# Patient Record
Sex: FEMALE | Race: WHITE | NOT HISPANIC OR LATINO | Employment: FULL TIME | URBAN - NONMETROPOLITAN AREA
[De-identification: names, ages, dates, MRNs, and addresses within clinical notes are randomized per-mention and may not be internally consistent; named-entity substitution may affect disease eponyms.]

---

## 2017-01-10 ENCOUNTER — LAB REQUISITION (OUTPATIENT)
Dept: LAB | Facility: HOSPITAL | Age: 47
End: 2017-01-10

## 2017-01-10 DIAGNOSIS — Z00.00 ENCOUNTER FOR GENERAL ADULT MEDICAL EXAMINATION WITHOUT ABNORMAL FINDINGS: ICD-10-CM

## 2017-01-10 PROCEDURE — 87088 URINE BACTERIA CULTURE: CPT | Performed by: INTERNAL MEDICINE

## 2017-01-10 PROCEDURE — 87086 URINE CULTURE/COLONY COUNT: CPT | Performed by: INTERNAL MEDICINE

## 2017-01-10 PROCEDURE — 87186 SC STD MICRODIL/AGAR DIL: CPT | Performed by: INTERNAL MEDICINE

## 2017-01-12 LAB — BACTERIA SPEC AEROBE CULT: ABNORMAL

## 2017-06-14 ENCOUNTER — TELEPHONE (OUTPATIENT)
Dept: GASTROENTEROLOGY | Age: 47
End: 2017-06-14

## 2017-08-15 ENCOUNTER — OFFICE VISIT (OUTPATIENT)
Dept: INTERNAL MEDICINE | Age: 47
End: 2017-08-15
Payer: COMMERCIAL

## 2017-08-15 VITALS
HEIGHT: 68 IN | WEIGHT: 226 LBS | SYSTOLIC BLOOD PRESSURE: 132 MMHG | BODY MASS INDEX: 34.25 KG/M2 | OXYGEN SATURATION: 98 % | TEMPERATURE: 97.4 F | HEART RATE: 86 BPM | DIASTOLIC BLOOD PRESSURE: 70 MMHG

## 2017-08-15 DIAGNOSIS — F41.9 ANXIETY: ICD-10-CM

## 2017-08-15 DIAGNOSIS — R94.5 ABNORMAL LIVER FUNCTION: ICD-10-CM

## 2017-08-15 DIAGNOSIS — M79.673 PAIN OF FOOT, UNSPECIFIED LATERALITY: ICD-10-CM

## 2017-08-15 DIAGNOSIS — E78.1 HYPERTRIGLYCERIDEMIA: ICD-10-CM

## 2017-08-15 DIAGNOSIS — J01.10 ACUTE FRONTAL SINUSITIS, UNSPECIFIED: ICD-10-CM

## 2017-08-15 DIAGNOSIS — S99.829A TOENAIL TORN AWAY: Primary | ICD-10-CM

## 2017-08-15 DIAGNOSIS — J01.00 ACUTE MAXILLARY SINUSITIS, UNSPECIFIED: ICD-10-CM

## 2017-08-15 DIAGNOSIS — S90.122S: ICD-10-CM

## 2017-08-15 DIAGNOSIS — J20.9 ACUTE BRONCHITIS, UNSPECIFIED ORGANISM: ICD-10-CM

## 2017-08-15 PROBLEM — S92.902A FOOT FRACTURE, LEFT: Status: ACTIVE | Noted: 2017-08-15

## 2017-08-15 PROCEDURE — 99213 OFFICE O/P EST LOW 20 MIN: CPT | Performed by: PHYSICIAN ASSISTANT

## 2017-08-15 RX ORDER — FLUTICASONE PROPIONATE 50 MCG
1 SPRAY, SUSPENSION (ML) NASAL DAILY
COMMUNITY
End: 2018-04-11 | Stop reason: ALTCHOICE

## 2017-08-15 RX ORDER — CYCLOBENZAPRINE HCL 10 MG
10 TABLET ORAL 3 TIMES DAILY PRN
COMMUNITY
End: 2018-04-11 | Stop reason: ALTCHOICE

## 2017-08-15 RX ORDER — SUMATRIPTAN 100 MG/1
100 TABLET, FILM COATED ORAL
COMMUNITY
End: 2018-10-16 | Stop reason: CLARIF

## 2017-08-15 RX ORDER — MONTELUKAST SODIUM 10 MG/1
10 TABLET ORAL NIGHTLY
COMMUNITY
End: 2018-10-16 | Stop reason: CLARIF

## 2017-08-15 ASSESSMENT — ENCOUNTER SYMPTOMS
DIARRHEA: 0
ABDOMINAL PAIN: 0
PHOTOPHOBIA: 0
NAUSEA: 0
COLOR CHANGE: 0
COUGH: 0
EYE REDNESS: 0
CHEST TIGHTNESS: 0
RHINORRHEA: 0
CONSTIPATION: 0
SORE THROAT: 0
EYE PAIN: 0
SHORTNESS OF BREATH: 0
WHEEZING: 0
SINUS PRESSURE: 0
VOMITING: 0

## 2017-08-15 ASSESSMENT — PATIENT HEALTH QUESTIONNAIRE - PHQ9
1. LITTLE INTEREST OR PLEASURE IN DOING THINGS: 0
SUM OF ALL RESPONSES TO PHQ QUESTIONS 1-9: 0
2. FEELING DOWN, DEPRESSED OR HOPELESS: 0
SUM OF ALL RESPONSES TO PHQ9 QUESTIONS 1 & 2: 0

## 2017-08-17 ENCOUNTER — TRANSCRIBE ORDERS (OUTPATIENT)
Dept: ADMINISTRATIVE | Facility: HOSPITAL | Age: 47
End: 2017-08-17

## 2017-08-17 DIAGNOSIS — Z12.31 ENCOUNTER FOR SCREENING MAMMOGRAM FOR MALIGNANT NEOPLASM OF BREAST: Primary | ICD-10-CM

## 2017-08-24 ENCOUNTER — APPOINTMENT (OUTPATIENT)
Dept: MAMMOGRAPHY | Facility: HOSPITAL | Age: 47
End: 2017-08-24
Attending: OBSTETRICS & GYNECOLOGY

## 2017-08-31 RX ORDER — BUSPIRONE HYDROCHLORIDE 15 MG/1
TABLET ORAL
Qty: 180 TABLET | Refills: 1 | Status: SHIPPED | OUTPATIENT
Start: 2017-08-31 | End: 2017-12-15

## 2017-09-01 ENCOUNTER — OFFICE VISIT (OUTPATIENT)
Dept: INTERNAL MEDICINE | Age: 47
End: 2017-09-01
Payer: COMMERCIAL

## 2017-09-01 VITALS
SYSTOLIC BLOOD PRESSURE: 124 MMHG | HEART RATE: 81 BPM | WEIGHT: 220 LBS | OXYGEN SATURATION: 98 % | DIASTOLIC BLOOD PRESSURE: 80 MMHG | TEMPERATURE: 97.9 F | BODY MASS INDEX: 33.34 KG/M2 | HEIGHT: 68 IN

## 2017-09-01 DIAGNOSIS — R53.83 FATIGUE, UNSPECIFIED TYPE: Primary | ICD-10-CM

## 2017-09-01 DIAGNOSIS — R42 DIZZINESS: ICD-10-CM

## 2017-09-01 DIAGNOSIS — D50.0 IRON DEFICIENCY ANEMIA DUE TO CHRONIC BLOOD LOSS: ICD-10-CM

## 2017-09-01 DIAGNOSIS — R53.1 WEAKNESS: ICD-10-CM

## 2017-09-01 LAB
ALBUMIN SERPL-MCNC: 3.8 G/DL (ref 3.5–5.2)
ALP BLD-CCNC: 89 U/L (ref 35–104)
ALT SERPL-CCNC: 41 U/L (ref 5–33)
ANION GAP SERPL CALCULATED.3IONS-SCNC: 13 MMOL/L (ref 7–19)
AST SERPL-CCNC: 36 U/L (ref 5–32)
BASOPHILS ABSOLUTE: 0.1 K/UL (ref 0–0.2)
BASOPHILS RELATIVE PERCENT: 0.6 % (ref 0–1)
BILIRUB SERPL-MCNC: <0.2 MG/DL (ref 0.2–1.2)
BUN BLDV-MCNC: 13 MG/DL (ref 6–20)
CALCIUM SERPL-MCNC: 9.2 MG/DL (ref 8.6–10)
CHLORIDE BLD-SCNC: 102 MMOL/L (ref 98–111)
CO2: 26 MMOL/L (ref 22–29)
CREAT SERPL-MCNC: 0.5 MG/DL (ref 0.5–0.9)
EOSINOPHILS ABSOLUTE: 0.2 K/UL (ref 0–0.6)
EOSINOPHILS RELATIVE PERCENT: 1.6 % (ref 0–5)
FERRITIN: 38.2 NG/ML (ref 13–150)
GFR NON-AFRICAN AMERICAN: >60
GLUCOSE BLD-MCNC: 90 MG/DL (ref 74–109)
HCT VFR BLD CALC: 43.2 % (ref 37–47)
HEMOGLOBIN: 13.8 G/DL (ref 12–16)
LYMPHOCYTES ABSOLUTE: 3 K/UL (ref 1.1–4.5)
LYMPHOCYTES RELATIVE PERCENT: 29.1 % (ref 20–40)
MCH RBC QN AUTO: 29.9 PG (ref 27–31)
MCHC RBC AUTO-ENTMCNC: 31.9 G/DL (ref 33–37)
MCV RBC AUTO: 93.7 FL (ref 81–99)
MONOCYTES ABSOLUTE: 1 K/UL (ref 0–0.9)
MONOCYTES RELATIVE PERCENT: 9.3 % (ref 0–10)
NEUTROPHILS ABSOLUTE: 6 K/UL (ref 1.5–7.5)
NEUTROPHILS RELATIVE PERCENT: 59 % (ref 50–65)
PDW BLD-RTO: 13.5 % (ref 11.5–14.5)
PLATELET # BLD: 373 K/UL (ref 130–400)
PMV BLD AUTO: 11.6 FL (ref 9.4–12.3)
POTASSIUM SERPL-SCNC: 4 MMOL/L (ref 3.5–5)
RBC # BLD: 4.61 M/UL (ref 4.2–5.4)
SODIUM BLD-SCNC: 141 MMOL/L (ref 136–145)
TOTAL IRON BINDING CAPACITY: 364 UG/DL (ref 250–400)
TOTAL PROTEIN: 7.3 G/DL (ref 6.6–8.7)
WBC # BLD: 10.2 K/UL (ref 4.8–10.8)

## 2017-09-01 PROCEDURE — 99213 OFFICE O/P EST LOW 20 MIN: CPT | Performed by: PHYSICIAN ASSISTANT

## 2017-09-01 RX ORDER — CIPROFLOXACIN 500 MG/1
500 TABLET, FILM COATED ORAL 2 TIMES DAILY
COMMUNITY
End: 2017-10-06

## 2017-09-01 ASSESSMENT — ENCOUNTER SYMPTOMS
CONSTIPATION: 0
ABDOMINAL PAIN: 0
SINUS PRESSURE: 0
COLOR CHANGE: 0
PHOTOPHOBIA: 0
RHINORRHEA: 0
NAUSEA: 0
SHORTNESS OF BREATH: 0
EYE PAIN: 0
CHEST TIGHTNESS: 0
VOMITING: 0
SORE THROAT: 0
COUGH: 0
DIARRHEA: 0
WHEEZING: 0
EYE REDNESS: 0

## 2017-10-06 ENCOUNTER — OFFICE VISIT (OUTPATIENT)
Dept: URGENT CARE | Age: 47
End: 2017-10-06
Payer: COMMERCIAL

## 2017-10-06 VITALS
SYSTOLIC BLOOD PRESSURE: 135 MMHG | TEMPERATURE: 98 F | BODY MASS INDEX: 33.46 KG/M2 | DIASTOLIC BLOOD PRESSURE: 84 MMHG | HEIGHT: 68 IN | WEIGHT: 220.8 LBS | OXYGEN SATURATION: 96 % | RESPIRATION RATE: 20 BRPM | HEART RATE: 100 BPM

## 2017-10-06 DIAGNOSIS — J01.00 ACUTE MAXILLARY SINUSITIS, RECURRENCE NOT SPECIFIED: Primary | ICD-10-CM

## 2017-10-06 PROCEDURE — 99202 OFFICE O/P NEW SF 15 MIN: CPT | Performed by: NURSE PRACTITIONER

## 2017-10-06 PROCEDURE — 96372 THER/PROPH/DIAG INJ SC/IM: CPT | Performed by: NURSE PRACTITIONER

## 2017-10-06 RX ORDER — AMOXICILLIN AND CLAVULANATE POTASSIUM 875; 125 MG/1; MG/1
1 TABLET, FILM COATED ORAL EVERY 12 HOURS
Qty: 20 TABLET | Refills: 0 | Status: SHIPPED | OUTPATIENT
Start: 2017-10-06 | End: 2017-10-16

## 2017-10-06 RX ORDER — DEXAMETHASONE SODIUM PHOSPHATE 100 MG/10ML
10 INJECTION INTRAMUSCULAR; INTRAVENOUS ONCE
Status: COMPLETED | OUTPATIENT
Start: 2017-10-06 | End: 2017-10-06

## 2017-10-06 RX ORDER — BENZONATATE 100 MG/1
100 CAPSULE ORAL 3 TIMES DAILY PRN
Qty: 30 CAPSULE | Refills: 0 | Status: SHIPPED | OUTPATIENT
Start: 2017-10-06 | End: 2017-10-13

## 2017-10-06 RX ADMIN — DEXAMETHASONE SODIUM PHOSPHATE 10 MG: 100 INJECTION INTRAMUSCULAR; INTRAVENOUS at 09:52

## 2017-10-06 ASSESSMENT — ENCOUNTER SYMPTOMS
SORE THROAT: 1
COUGH: 1
GASTROINTESTINAL NEGATIVE: 1
SINUS PRESSURE: 1

## 2017-10-06 NOTE — PROGRESS NOTES
[address] [address]  Dept: [phone]  Loc: [phone]    Artemio Jacobo is a 50 y.o. female who presents today for her medical conditions/complaints as noted below. Artemio Jacobo is c/o of Cough (started yesterday and can be productive at times); Nasal Congestion; Headache; Chest Congestion; and Pharyngitis        HPI:     HPI     Patient presents to office complaining of sinus pain, congestion, headache, nasal congestion and headache that started about a week ago. She denies any fever. She reports some chills Wednesday afternoon. She reports that her cough is productive at times. She reports a sore throat with postnasal drainage. She denies any abd pain, vomiting or diarrhea. Past Medical History:   Diagnosis Date    Acquired deviated nasal septum     Allergic rhinitis     Ankle injury     Asthma     Benign paroxysmal positional vertigo     Depression     Derangement of medial meniscus     L knee mild, clinical diagnosis 9/19/12 after turning foot and popping sound 9/16/12 while walking and turn.     Eustachian tube dysfunction     Excessive sleepiness     During the day    H/O varicose veins     History of acute bronchitis     History of acute otitis media     History of bone density study     History of sprain of foot     Osteoarthritis     Pain in joint of left knee     Palpitations     Psoriasis     Type B influenza       Past Surgical History:   Procedure Laterality Date    APPENDECTOMY      BREAST SURGERY      lift and a left implant   [place] [facility]    Dr. Lani Hansen    ENDOSCOPY, COLON, DIAGNOSTIC      KNEE ARTHROSCOPY Bilateral     OH COLONOSCOPY FLX DX W/COLLJ SPEC WHEN PFRMD N/A 12/19/2016    Dr Dee Hansen       Family History   Problem Relation Age of screen  06/12/1983    DTaP/Tdap/Td vaccine (1 - Tdap) 06/12/1987    Cervical cancer screen  06/12/1989    Lipid screen  06/12/2008    Flu vaccine (1) 09/01/2017    Colon cancer screen colonoscopy  12/19/2021       Subjective:      Review of Systems   Constitutional: Negative for fever. HENT: Positive for congestion, postnasal drip, sinus pressure and sore throat. Negative for ear pain. Respiratory: Positive for cough. Cardiovascular: Negative. Gastrointestinal: Negative. Neurological: Positive for headaches. Objective:     Physical Exam   Constitutional: She is oriented to person, place, and time. She appears well-developed and well-nourished. No distress. HENT:   Head: Normocephalic and atraumatic. Right Ear: Hearing, external ear and ear canal normal. A middle ear effusion is present. Left Ear: Hearing, external ear and ear canal normal. A middle ear effusion is present. Nose: Right sinus exhibits maxillary sinus tenderness. Right sinus exhibits no frontal sinus tenderness. Left sinus exhibits maxillary sinus tenderness. Left sinus exhibits no frontal sinus tenderness. Mouth/Throat: Uvula is midline and mucous membranes are normal. Posterior oropharyngeal erythema present. No oropharyngeal exudate. Eyes: Pupils are equal, round, and reactive to light. Neck: Normal range of motion. Cardiovascular: Normal rate, regular rhythm and normal heart sounds. No murmur heard. Pulmonary/Chest: Effort normal and breath sounds normal. No respiratory distress. She has no wheezes. She has no rales. Abdominal: Soft. Bowel sounds are normal.   Musculoskeletal: Normal range of motion. Lymphadenopathy:     She has no cervical adenopathy. Neurological: She is alert and oriented to person, place, and time. Skin: Skin is warm and dry. No rash noted. No erythema. Psychiatric: She has a normal mood and affect.  Her speech is normal and behavior is normal. Judgment and thought content normal.   Nursing note and vitals reviewed. /84  Pulse 100  Temp 98 °F (36.7 °C) (Oral)   Resp 20  Ht 5' 8\" (1.727 m)  Wt 220 lb 12.8 oz (100.2 kg)  SpO2 96%  BMI 33.57 kg/m2    Assessment:      1. Acute maxillary sinusitis, recurrence not specified         Plan:     Discussed diagnosis and treatment with patient. Take full course of antibiotics. Steroid injection in office. Use tessalon as needed for coughing. Advised symptomatic treatment. If patient is not improving or developing any new/worsening symptoms then RTC, prn or go to ER. Patient is to follow up with PCP as needed. Patient verbalizes understanding. No orders of the defined types were placed in this encounter. Return if symptoms worsen or fail to improve. No orders of the defined types were placed in this encounter. Orders Placed This Encounter   Medications    amoxicillin-clavulanate (AUGMENTIN) 875-125 MG per tablet     Sig: Take 1 tablet by mouth every 12 hours for 10 days     Dispense:  20 tablet     Refill:  0    dexamethasone (DECADRON) injection 10 mg    benzonatate (TESSALON PERLES) 100 MG capsule     Sig: Take 1 capsule by mouth 3 times daily as needed for Cough     Dispense:  30 capsule     Refill:  0       Patient given educational materials - see patient instructions. Discussed use, benefit, and side effects of prescribed medications. All patient questions answered. Pt voiced understanding. Reviewed health maintenance. Instructed to continue current medications, diet and exercise. Patient agreed with treatment plan. Follow up as directed. Patient Instructions        Sinusitis: Care Instructions  Your Care Instructions    Sinusitis is an infection of the lining of the sinus cavities in your head. Sinusitis often follows a cold. It causes pain and pressure in your head and face. In most cases, sinusitis gets better on its own in 1 to 2 weeks.  But some mild symptoms may last for several

## 2017-10-06 NOTE — MR AVS SNAPSHOT
After Visit Summary             Rasheed Fletcher   10/6/2017 9:25 AM   Office Visit    Description:  Female : 1970   Provider:  MICHAEL Garber   Department:  [facility]              Your Follow-Up and Future Appointments         Below is a list of your follow-up and future appointments. This may not be a complete list as you may have made appointments directly with providers that we are not aware of or your providers may have made some for you. Please call your providers to confirm appointments. It is important to keep your appointments. Please bring your current insurance card, photo ID, co-pay, and all medication bottles to your appointment. If self-pay, payment is expected at the time of service. Your To-Do List     Future Appointments Provider Department Dept Phone    2017 2:00 PM Zack Montenegro MD [facility] [phone]    Please arrive 15 minutes prior to appointment time, bring insurance card and photo ID. Follow-Up    Return if symptoms worsen or fail to improve. Information from Your Visit        Department     Name Address  [facility] [address] [place] [address]       You Were Seen for:         Comments    Acute maxillary sinusitis, recurrence not specified   [[id number]]         Vital Signs     Blood Pressure Pulse Temperature Respirations Height Weight    135/84 100 98 °F (36.7 °C) (Oral) 20 5' 8\" (1.727 m) 220 lb 12.8 oz (100.2 kg)    Oxygen Saturation Body Mass Index Smoking Status             96% 33.57 kg/m2 Former Smoker         Additional Information about your Body Mass Index (BMI)           Your BMI as listed above is considered obese (30 or more). BMI is an estimate of body fat, calculated from your height and weight.   The higher your BMI, the greater your risk of heart disease, high blood pressure, type 2 diabetes, stroke, gallstones, arthritis, sleep apnea, and certain cancers. BMI is not perfect. It may overestimate body fat in athletes and people who are more muscular. Even a small weight loss (between 5 and 10 percent of your current weight) by decreasing your calorie intake and becoming more physically active will help lower your risk of developing or worsening diseases associated with obesity. Learn more at: [employer]          Instructions         Sinusitis: Care Instructions  Your Care Instructions    Sinusitis is an infection of the lining of the sinus cavities in your head. Sinusitis often follows a cold. It causes pain and pressure in your head and face. In most cases, sinusitis gets better on its own in 1 to 2 weeks. But some mild symptoms may last for several weeks. Sometimes antibiotics are needed. Follow-up care is a key part of your treatment and safety. Be sure to make and go to all appointments, and call your doctor if you are having problems. It's also a good idea to know your test results and keep a list of the medicines you take. How can you care for yourself at home? · Take an over-the-counter pain medicine, such as acetaminophen (Tylenol), ibuprofen (Advil, Motrin), or naproxen (Aleve). Read and follow all instructions on the label. · If the doctor prescribed antibiotics, take them as directed. Do not stop taking them just because you feel better. You need to take the full course of antibiotics. · Be careful when taking over-the-counter cold or flu medicines and Tylenol at the same time. Many of these medicines have acetaminophen, which is Tylenol. Read the labels to make sure that you are not taking more than the recommended dose. Too much acetaminophen (Tylenol) can be harmful. · Breathe warm, moist air from a steamy shower, a hot bath, or a sink filled with hot water. Avoid cold, dry air. Using a humidifier in your home may help. Follow the directions for cleaning the machine. · Use saline (saltwater) nasal washes to help keep your nasal passages open and wash out mucus and bacteria. You can buy saline nose drops at a grocery store or drugstore. Or you can make your own at home by adding 1 teaspoon of salt and 1 teaspoon of baking soda to 2 cups of distilled water. If you make your own, fill a bulb syringe with the solution, insert the tip into your nostril, and squeeze gently. Yasmin Fogo your nose. · Put a hot, wet towel or a warm gel pack on your face 3 or 4 times a day for 5 to 10 minutes each time. · Try a decongestant nasal spray like oxymetazoline (Afrin). Do not use it for more than 3 days in a row. Using it for more than 3 days can make your congestion worse. When should you call for help? Call your doctor now or seek immediate medical care if:  · You have new or worse swelling or redness in your face or around your eyes. · You have a new or higher fever. Watch closely for changes in your health, and be sure to contact your doctor if:  · You have new or worse facial pain. · The mucus from your nose becomes thicker (like pus) or has new blood in it. · You are not getting better as expected. Where can you learn more? Go to [email or web address] and sign in to your Tunespeak account. Enter G794 in the [facility] box to learn more about \"Sinusitis: Care Instructions. \"     If you do not have an account, please click on the \"Sign Up Now\" link. Current as of: July 29, 2016  Content Version: 11.3  © 2826-2181 [employer]. Care instructions adapted under license by [facility] ([facility]). If you have questions about a medical condition or this instruction, always ask your healthcare professional. [address]  any warranty or liability for your use of this information. 1. Take full course of antibiotics  2. Steroid injection in office  3. Take tessalon as needed for coughing  4.  Monitor fever and treat as needed 5. May use OTC claritin/zyrtec and nasal spray such as flonase to help symptoms  6. If patient is not improving or developing any new/worsening symptoms then RTC prn              Today's Medication Changes          These changes are accurate as of: 10/6/17  9:49 AM.  If you have any questions, ask your nurse or doctor. START taking these medications           amoxicillin-clavulanate 875-125 MG per tablet   Commonly known as:  AUGMENTIN   Instructions: Take 1 tablet by mouth every 12 hours for 10 days   Quantity:  20 tablet   Refills:  0       benzonatate 100 MG capsule   Commonly known as:  TESSALON PERLES   Instructions: Take 1 capsule by mouth 3 times daily as needed for Cough   Quantity:  30 capsule   Refills:  0         STOP taking these medications           CIPRO 500 MG tablet   Generic drug:  ciprofloxacin            Where to Get Your Medications      These medications were sent to [address] [place] [address] 7  [address] [address], [address] [address]     Phone:  [phone]     amoxicillin-clavulanate 875-125 MG per tablet    benzonatate 100 MG capsule               Your Current Medications Are              amoxicillin-clavulanate (AUGMENTIN) 875-125 MG per tablet Take 1 tablet by mouth every 12 hours for 10 days    benzonatate (TESSALON PERLES) 100 MG capsule Take 1 capsule by mouth 3 times daily as needed for Cough    Apremilast (OTEZLA) 30 MG TABS Take 30 mg by mouth 2 times daily    busPIRone (BUSPAR) 15 MG tablet TAKE 1 TABLET EVERY 12 HOURS AS NEEDED.     cyclobenzaprine (FLEXERIL) 10 MG tablet Take 10 mg by mouth 3 times daily as needed for Muscle spasms    meloxicam (MOBIC) 15 MG tablet Take 15 mg by mouth    venlafaxine (EFFEXOR) 75 MG tablet Take 75 mg by mouth    calcium carbonate (OSCAL) 500 MG TABS tablet Take 500 mg by mouth daily    hyoscyamine (LEVBID) 0.375 MG extended release tablet Take 1 tablet by mouth every 12 hours as needed for Cramping or Diarrhea    Ferrous Fumarate 325 (106 Fe) MG TABS Take 1 tablet by mouth daily    fluticasone (FLONASE) 50 MCG/ACT nasal spray 1 spray by Nasal route daily    SUMAtriptan (IMITREX) 100 MG tablet Take 100 mg by mouth once as needed for Migraine    montelukast (SINGULAIR) 10 MG tablet Take 10 mg by mouth nightly    Vitamin D, Cholecalciferol, 1000 UNITS CAPS Take by mouth      Allergies           No Known Allergies         Additional Information        Basic Information     Date Of Birth Sex Race Ethnicity Preferred Language    1970 Female White Non-/Non  English      Problem List as of 10/6/2017  Date Reviewed: 9/1/2017                Abnormal liver function    Acute bronchitis    Acute frontal sinusitis, unspecified    Acute maxillary sinusitis, unspecified    Foot fracture, left    Foot pain    Anxiety    Hypertriglyceridemia    Diarrhea    Change in bowel habit    Anal bleeding    Cystitis    Elevated blood pressure    FOM (frequency of micturition)    Essential (primary) hypertension      Immunizations as of 10/6/2017     Name Date    Influenza Vaccine, unspecified formulation 10/19/2016      Preventive Care        Date Due    HIV screening is recommended for all people regardless of risk factors  aged 15-65 years at least once (lifetime) who have never been HIV tested. 6/12/1983    Tetanus Combination Vaccine (1 - Tdap) 6/12/1987    Pap Smear 6/12/1989    Cholesterol Screening 6/12/2008    Yearly Flu Vaccine (1) 9/1/2017    Colonoscopy 12/19/2021            MyChart Signup           MyChart allows you to send messages to your doctor, view your test results, renew your prescriptions, schedule appointments, view visit notes, and more. How Do I Sign Up? 1. In your Internet browser, go to [email or web address]  2. Click on the Sign Up Now link in the Sign In box. You will see the New Member Sign Up page.

## 2017-10-06 NOTE — PATIENT INSTRUCTIONS
Sinusitis: Care Instructions  Your Care Instructions    Sinusitis is an infection of the lining of the sinus cavities in your head. Sinusitis often follows a cold. It causes pain and pressure in your head and face. In most cases, sinusitis gets better on its own in 1 to 2 weeks. But some mild symptoms may last for several weeks. Sometimes antibiotics are needed. Follow-up care is a key part of your treatment and safety. Be sure to make and go to all appointments, and call your doctor if you are having problems. It's also a good idea to know your test results and keep a list of the medicines you take. How can you care for yourself at home? · Take an over-the-counter pain medicine, such as acetaminophen (Tylenol), ibuprofen (Advil, Motrin), or naproxen (Aleve). Read and follow all instructions on the label. · If the doctor prescribed antibiotics, take them as directed. Do not stop taking them just because you feel better. You need to take the full course of antibiotics. · Be careful when taking over-the-counter cold or flu medicines and Tylenol at the same time. Many of these medicines have acetaminophen, which is Tylenol. Read the labels to make sure that you are not taking more than the recommended dose. Too much acetaminophen (Tylenol) can be harmful. · Breathe warm, moist air from a steamy shower, a hot bath, or a sink filled with hot water. Avoid cold, dry air. Using a humidifier in your home may help. Follow the directions for cleaning the machine. · Use saline (saltwater) nasal washes to help keep your nasal passages open and wash out mucus and bacteria. You can buy saline nose drops at a grocery store or drugstore. Or you can make your own at home by adding 1 teaspoon of salt and 1 teaspoon of baking soda to 2 cups of distilled water. If you make your own, fill a bulb syringe with the solution, insert the tip into your nostril, and squeeze gently. Yasmin Fogo your nose.   · Put a hot, wet towel or a warm

## 2017-10-22 PROBLEM — F41.1 GENERALIZED ANXIETY DISORDER: Status: ACTIVE | Noted: 2017-10-22

## 2017-10-22 PROBLEM — R73.01 IFG (IMPAIRED FASTING GLUCOSE): Status: ACTIVE | Noted: 2017-10-22

## 2017-10-22 PROBLEM — K58.9 IBS (IRRITABLE BOWEL SYNDROME): Status: ACTIVE | Noted: 2017-10-22

## 2017-10-22 PROBLEM — Z12.11 SCREENING FOR COLORECTAL CANCER: Status: ACTIVE | Noted: 2017-10-22

## 2017-10-22 PROBLEM — F33.2 ENDOGENOUS DEPRESSION (HCC): Status: ACTIVE | Noted: 2017-10-22

## 2017-10-22 PROBLEM — L40.9 PSORIASIS: Status: ACTIVE | Noted: 2017-10-22

## 2017-10-22 PROBLEM — E66.09 EXOGENOUS OBESITY: Status: ACTIVE | Noted: 2017-10-22

## 2017-10-22 PROBLEM — J01.00 ACUTE MAXILLARY SINUSITIS, UNSPECIFIED: Status: RESOLVED | Noted: 2017-08-15 | Resolved: 2017-10-22

## 2017-10-22 PROBLEM — J01.10 ACUTE FRONTAL SINUSITIS, UNSPECIFIED: Status: RESOLVED | Noted: 2017-08-15 | Resolved: 2017-10-22

## 2017-10-22 PROBLEM — Z12.12 SCREENING FOR COLORECTAL CANCER: Status: ACTIVE | Noted: 2017-10-22

## 2017-10-24 ENCOUNTER — HOSPITAL ENCOUNTER (OUTPATIENT)
Dept: MAMMOGRAPHY | Facility: HOSPITAL | Age: 47
Discharge: HOME | End: 2017-10-24
Attending: OBSTETRICS & GYNECOLOGY | Admitting: OBSTETRICS & GYNECOLOGY

## 2017-10-24 DIAGNOSIS — Z12.31 ENCOUNTER FOR SCREENING MAMMOGRAM FOR MALIGNANT NEOPLASM OF BREAST: ICD-10-CM

## 2017-10-24 PROCEDURE — G0202 SCR MAMMO BI INCL CAD: HCPCS

## 2017-10-24 PROCEDURE — 77063 BREAST TOMOSYNTHESIS BI: CPT

## 2017-10-30 DIAGNOSIS — E78.1 HYPERTRIGLYCERIDEMIA: ICD-10-CM

## 2017-10-30 DIAGNOSIS — Z00.00 ANNUAL PHYSICAL EXAM: ICD-10-CM

## 2017-10-30 DIAGNOSIS — R73.01 IFG (IMPAIRED FASTING GLUCOSE): Primary | ICD-10-CM

## 2017-12-15 ENCOUNTER — OFFICE VISIT (OUTPATIENT)
Dept: INTERNAL MEDICINE | Age: 47
End: 2017-12-15
Payer: COMMERCIAL

## 2017-12-15 VITALS
SYSTOLIC BLOOD PRESSURE: 103 MMHG | BODY MASS INDEX: 33.49 KG/M2 | HEART RATE: 106 BPM | HEIGHT: 68 IN | DIASTOLIC BLOOD PRESSURE: 70 MMHG | RESPIRATION RATE: 18 BRPM | WEIGHT: 221 LBS | OXYGEN SATURATION: 98 %

## 2017-12-15 DIAGNOSIS — E66.09 EXOGENOUS OBESITY: ICD-10-CM

## 2017-12-15 DIAGNOSIS — F33.2 ENDOGENOUS DEPRESSION (HCC): ICD-10-CM

## 2017-12-15 DIAGNOSIS — F41.1 GENERALIZED ANXIETY DISORDER: Primary | ICD-10-CM

## 2017-12-15 DIAGNOSIS — Z23 IMMUNIZATION DUE: ICD-10-CM

## 2017-12-15 DIAGNOSIS — I10 ESSENTIAL (PRIMARY) HYPERTENSION: ICD-10-CM

## 2017-12-15 PROCEDURE — 90471 IMMUNIZATION ADMIN: CPT | Performed by: INTERNAL MEDICINE

## 2017-12-15 PROCEDURE — 90686 IIV4 VACC NO PRSV 0.5 ML IM: CPT | Performed by: INTERNAL MEDICINE

## 2017-12-15 PROCEDURE — 99214 OFFICE O/P EST MOD 30 MIN: CPT | Performed by: INTERNAL MEDICINE

## 2017-12-15 RX ORDER — DESVENLAFAXINE 100 MG/1
100 TABLET, EXTENDED RELEASE ORAL DAILY
Qty: 30 TABLET | Refills: 3 | Status: SHIPPED | OUTPATIENT
Start: 2017-12-15 | End: 2018-03-19 | Stop reason: SDUPTHER

## 2017-12-15 RX ORDER — BUSPIRONE HYDROCHLORIDE 30 MG/1
TABLET ORAL
Qty: 60 TABLET | Refills: 5 | Status: SHIPPED | OUTPATIENT
Start: 2017-12-15 | End: 2018-04-23 | Stop reason: SDUPTHER

## 2017-12-15 ASSESSMENT — ENCOUNTER SYMPTOMS
ABDOMINAL PAIN: 0
CHEST TIGHTNESS: 0
WHEEZING: 0
COUGH: 0
SORE THROAT: 0
CONSTIPATION: 0

## 2017-12-15 NOTE — PATIENT INSTRUCTIONS
losing weight if you keep track of what you eat and what you do. Follow-up care is a key part of your treatment and safety. Be sure to make and go to all appointments, and call your doctor if you are having problems. It's also a good idea to know your test results and keep a list of the medicines you take. Where can you learn more? Go to https://[email or web address].[email or web address] org and sign in to your pMediaNetwork account. Enter A121 in the [employer] box to learn more about \"Learning About Low-Carbohydrate Diets for Weight Loss. \"     If you do not have an account, please click on the \"Sign Up Now\" link. Current as of: May 12, 2017  Content Version: 11.4  © 8121-5286 [employer]. Care instructions adapted under license by [facility] ([facility]). If you have questions about a medical condition or this instruction, always ask your healthcare professional. [address] any warranty or liability for your use of this information.

## 2017-12-15 NOTE — PROGRESS NOTES
Chief Complaint   Patient presents with    Follow-up     medication review     History of presenting illness:  Jovanni Hughes is a 50 y.o. female who presents today for follow up on her chronic medical conditions as noted below. 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    Psoriasis 10/22/2017    IBS (irritable bowel syndrome) 10/22/2017    Screening for colorectal cancer 10/22/2017     Overview Note:     12/16 repeat 5 yrs      History of bone density study     Abnormal liver function 08/15/2017    Foot fracture, left 08/15/2017    Foot pain 08/15/2017    Hypertriglyceridemia 08/15/2017    Altered bowel function 10/31/2016     Overview Note:     Updating Deprecated Diagnoses      Elevated blood pressure 10/15/2016    Essential (primary) hypertension 10/15/2016     Past Medical History:   Diagnosis Date    Acquired deviated nasal septum     Anal bleeding 10/31/2016    Ankle injury     Asthma     Benign paroxysmal positional vertigo     Derangement of medial meniscus     L knee mild, clinical diagnosis 9/19/12 after turning foot and popping sound 9/16/12 while walking and turn.     Eustachian tube dysfunction     Excessive sleepiness     During the day    H/O varicose veins     History of sprain of foot     Osteoarthritis     Pain in joint of left knee     Palpitations     Psoriasis       Past Surgical History:   Procedure Laterality Date    APPENDECTOMY      BREAST SURGERY      lift and a left implant   [address] [place], [place]    ENDOSCOPY, COLON, DIAGNOSTIC      KNEE ARTHROSCOPY Bilateral     AR COLONOSCOPY FLX DX W/COLLJ SPEC WHEN PFRMD N/A 12/19/2016    Dr Vinod Blevins    SINUS SURGERY      nasal septoplasty/ maxillary antrostomies    TONSILLECTOMY      [facility] Gre[facility], [facility]     Current Outpatient Prescriptions   Medication Sig Dispense Refill    desvenlafaxine succinate (PRISTIQ) 100 MG TB24 extended release tablet Take 1 tablet by mouth daily 30 tablet 3    busPIRone (BUSPAR) 30 MG tablet Take 1/2 to  One tablet bid 60 tablet 5    Apremilast (OTEZLA) 30 MG TABS Take 30 mg by mouth 2 times daily      fluticasone (FLONASE) 50 MCG/ACT nasal spray 1 spray by Nasal route daily      SUMAtriptan (IMITREX) 100 MG tablet Take 100 mg by mouth once as needed for Migraine      montelukast (SINGULAIR) 10 MG tablet Take 10 mg by mouth nightly      cyclobenzaprine (FLEXERIL) 10 MG tablet Take 10 mg by mouth 3 times daily as needed for Muscle spasms      meloxicam (MOBIC) 15 MG tablet Take 15 mg by mouth      Vitamin D, Cholecalciferol, 1000 UNITS CAPS Take by mouth      calcium carbonate (OSCAL) 500 MG TABS tablet Take 500 mg by mouth daily      hyoscyamine (LEVBID) 0.375 MG extended release tablet Take 1 tablet by mouth every 12 hours as needed for Cramping or Diarrhea 60 tablet 5     No current facility-administered medications for this visit. No Known Allergies  Social History   Substance Use Topics    Smoking status: Former Smoker     Quit date: 1996    Smokeless tobacco: Never Used    Alcohol use Yes      Comment: occ      Family History   Problem Relation Age of Onset    Colon Cancer Neg Hx     Colon Polyps Neg Hx     Esophageal Cancer Neg Hx     Liver Cancer Neg Hx     Liver Disease Neg Hx     Stomach Cancer Neg Hx     Rectal Cancer Neg Hx        Review of Systems   Constitutional: Positive for fatigue. Negative for chills and fever.    HENT: Negative for congestion, ear pain, nosebleeds, postnasal drip and sore throat. Respiratory: Negative for cough, chest tightness and wheezing. Cardiovascular: Negative for chest pain, palpitations and leg swelling. Gastrointestinal: Negative for abdominal pain and constipation. Genitourinary: Negative for dysuria and urgency. Musculoskeletal: Positive for arthralgias. Skin: Negative for rash. Neurological: Negative for dizziness and headaches. Psychiatric/Behavioral: The patient is nervous/anxious. Vitals:    12/15/17 0828   BP: 103/70   Site: Left Arm   Position: Sitting   Cuff Size: Large Adult   Pulse: 106   Resp: 18   SpO2: 98%   Weight: 221 lb (100.2 kg)   Height: 5' 8\" (1.727 m)     Body mass index is 33.6 kg/m². Physical Exam   Constitutional: She is oriented to person, place, and time. She appears well-developed. No distress. HENT:   Head: Normocephalic and atraumatic. Nose: Nose normal.   Mouth/Throat: Oropharynx is clear and moist.   Eyes: Conjunctivae are normal. Pupils are equal, round, and reactive to light. Right eye exhibits no discharge. Left eye exhibits no discharge. No scleral icterus. Neck: Normal range of motion. No JVD present. No thyromegaly present. Cardiovascular: Normal rate and regular rhythm. No murmur heard. Pulmonary/Chest: She has no wheezes. She has rales. She exhibits no tenderness. Abdominal: Bowel sounds are normal. She exhibits no distension. There is no guarding. Musculoskeletal: She exhibits no edema. Lymphadenopathy:     She has no cervical adenopathy. Neurological: She is oriented to person, place, and time. She has normal reflexes. No cranial nerve deficit. Coordination normal.   Skin: Skin is dry. No rash noted. She is not diaphoretic. No erythema. Psychiatric: Her behavior is normal. Judgment and thought content normal.       Lab Review   No visits with results within 2 Month(s) from this visit.    Latest known visit with results is: control,following lower carbohydrate dietary regimen and healthy snacks along side an active lifestyle with supplementary exercise of approx 30 minutes a week, 5 days a week of exercise for weight loss. The patient voiced increased understanding of the topics discussed.    '          Orders Placed This Encounter   Procedures    INFLUENZA, QUADV, 3 YRS AND OLDER, IM, PF, PREFILL SYR OR SDV, 0.5ML (FLUZONE QUADV, PF)     New Prescriptions    BUSPIRONE (BUSPAR) 30 MG TABLET    Take 1/2 to  One tablet bid    DESVENLAFAXINE SUCCINATE (PRISTIQ) 100 MG TB24 EXTENDED RELEASE TABLET    Take 1 tablet by mouth daily        Return in about 3 months (around 3/15/2018) for Annual Physical.   Patient Instructions     Patient Education        Learning About Low-Carbohydrate Diets for Weight Loss  What is a low-carbohydrate diet? Low-carb diets avoid foods that are high in carbohydrate. These high-carb foods include pasta, bread, rice, cereal, fruits, and starchy vegetables. Instead, these diets usually have you eat foods that are high in fat and protein. Many people lose weight quickly on a low-carb diet. But the early weight loss is water. People on this diet often gain the weight back after they start eating carbs again. Not all diet plans are safe or work well. A lot of the evidence shows that low-carb diets aren't healthy. That's because these diets often don't include healthy foods like fruits and vegetables. Losing weight safely means balancing protein, fat, and carbs with every meal and snack. And low-carb diets don't always provide the vitamins, minerals, and fiber you need. If you have a serious medical condition, talk to your doctor before you try any diet. These conditions include kidney disease, heart disease, type 2 diabetes, high cholesterol, and high blood pressure. If you are pregnant, it may not be safe for your baby if you are on a low-carb diet. How can you lose weight safely?   You might have heard that a diet plan helped another person lose weight. But that doesn't mean that it will work for you. It is very hard to stay on a diet that includes lots of big changes in your eating habits. If you want to get to a healthy weight and stay there, making healthy lifestyle changes will often work better than dieting. These steps can help. · Make a plan for change. Work with your doctor to create a plan that is right for you. · See a dietitian. He or she can show you how to make healthy changes in your eating habits. · Manage stress. If you have a lot of stress in your life, it can be hard to focus on making healthy changes to your daily habits. · Track your food and activity. You are likely to do better at losing weight if you keep track of what you eat and what you do. Follow-up care is a key part of your treatment and safety. Be sure to make and go to all appointments, and call your doctor if you are having problems. It's also a good idea to know your test results and keep a list of the medicines you take. Where can you learn more? Go to https://[employer]. org and sign in to your [facility] account. Enter A121 in the [employer] box to learn more about \"Learning About Low-Carbohydrate Diets for Weight Loss. \"     If you do not have an account, please click on the \"Sign Up Now\" link. Current as of: May 12, 2017  Content Version: 11.4  © 9851-5901 Healthwise, [employer]. Care instructions adapted under license by [facility] ([facility]). If you have questions about a medical condition or this instruction, always ask your healthcare professional. [address] any warranty or liability for your use of this information. EMR Dragon/transcription disclaimer:Significant part of this  encounter note is electronic transcription/translation of spoken language to printed text.  The electronic translation of spoken language may be erroneous, or at times, nonsensical words or

## 2018-04-11 ENCOUNTER — OFFICE VISIT (OUTPATIENT)
Dept: URGENT CARE | Age: 48
End: 2018-04-11
Payer: COMMERCIAL

## 2018-04-11 VITALS
RESPIRATION RATE: 20 BRPM | TEMPERATURE: 97.5 F | HEIGHT: 68 IN | SYSTOLIC BLOOD PRESSURE: 122 MMHG | OXYGEN SATURATION: 98 % | WEIGHT: 220 LBS | HEART RATE: 88 BPM | BODY MASS INDEX: 33.34 KG/M2 | DIASTOLIC BLOOD PRESSURE: 88 MMHG

## 2018-04-11 DIAGNOSIS — M54.50 LOW BACK PAIN WITHOUT SCIATICA, UNSPECIFIED BACK PAIN LATERALITY, UNSPECIFIED CHRONICITY: Primary | ICD-10-CM

## 2018-04-11 LAB
APPEARANCE FLUID: NORMAL
BILIRUBIN, POC: NORMAL
BLOOD URINE, POC: NORMAL
CLARITY, POC: NORMAL
COLOR, POC: NORMAL
GLUCOSE URINE, POC: NORMAL
KETONES, POC: NORMAL
LEUKOCYTE EST, POC: NORMAL
NITRITE, POC: NORMAL
PH, POC: 6.5
PROTEIN, POC: NORMAL
SPECIFIC GRAVITY, POC: 1.02
UROBILINOGEN, POC: 0.2

## 2018-04-11 PROCEDURE — 99214 OFFICE O/P EST MOD 30 MIN: CPT | Performed by: PHYSICIAN ASSISTANT

## 2018-04-11 PROCEDURE — 81002 URINALYSIS NONAUTO W/O SCOPE: CPT | Performed by: PHYSICIAN ASSISTANT

## 2018-04-11 RX ORDER — METAXALONE 800 MG/1
800 TABLET ORAL 2 TIMES DAILY
Qty: 20 TABLET | Refills: 0 | Status: SHIPPED | OUTPATIENT
Start: 2018-04-11 | End: 2018-04-21

## 2018-04-11 ASSESSMENT — ENCOUNTER SYMPTOMS
NAUSEA: 0
ABDOMINAL PAIN: 0
VOMITING: 0
DIARRHEA: 0

## 2018-04-12 PROBLEM — Z12.12 SCREENING FOR COLORECTAL CANCER: Status: RESOLVED | Noted: 2017-10-22 | Resolved: 2018-04-12

## 2018-04-12 PROBLEM — Z12.11 SCREENING FOR COLORECTAL CANCER: Status: RESOLVED | Noted: 2017-10-22 | Resolved: 2018-04-12

## 2018-04-13 LAB — URINE CULTURE, ROUTINE: NORMAL

## 2018-04-16 DIAGNOSIS — E78.1 HYPERTRIGLYCERIDEMIA: ICD-10-CM

## 2018-04-16 DIAGNOSIS — Z00.00 ANNUAL PHYSICAL EXAM: ICD-10-CM

## 2018-04-16 DIAGNOSIS — R73.01 IFG (IMPAIRED FASTING GLUCOSE): ICD-10-CM

## 2018-04-16 LAB
ALBUMIN SERPL-MCNC: 4 G/DL (ref 3.5–5.2)
ALP BLD-CCNC: 144 U/L (ref 35–104)
ALT SERPL-CCNC: 104 U/L (ref 5–33)
ANION GAP SERPL CALCULATED.3IONS-SCNC: 12 MMOL/L (ref 7–19)
AST SERPL-CCNC: 56 U/L (ref 5–32)
BACTERIA: ABNORMAL /HPF
BILIRUB SERPL-MCNC: 0.3 MG/DL (ref 0.2–1.2)
BILIRUBIN URINE: NEGATIVE
BLOOD, URINE: NEGATIVE
BUN BLDV-MCNC: 11 MG/DL (ref 6–20)
CALCIUM SERPL-MCNC: 10.1 MG/DL (ref 8.6–10)
CHLORIDE BLD-SCNC: 103 MMOL/L (ref 98–111)
CHOLESTEROL, TOTAL: 206 MG/DL (ref 160–199)
CLARITY: ABNORMAL
CO2: 28 MMOL/L (ref 22–29)
COLOR: YELLOW
CREAT SERPL-MCNC: 0.5 MG/DL (ref 0.5–0.9)
EPITHELIAL CELLS, UA: 6 /HPF (ref 0–5)
GFR NON-AFRICAN AMERICAN: >60
GLUCOSE BLD-MCNC: 99 MG/DL (ref 74–109)
GLUCOSE URINE: NEGATIVE MG/DL
HBA1C MFR BLD: 5.9 %
HCT VFR BLD CALC: 45.3 % (ref 37–47)
HDLC SERPL-MCNC: 50 MG/DL (ref 65–121)
HEMOGLOBIN: 14.1 G/DL (ref 12–16)
HYALINE CASTS: 4 /HPF (ref 0–8)
KETONES, URINE: NEGATIVE MG/DL
LDL CHOLESTEROL CALCULATED: 121 MG/DL
LEUKOCYTE ESTERASE, URINE: ABNORMAL
MCH RBC QN AUTO: 28.4 PG (ref 27–31)
MCHC RBC AUTO-ENTMCNC: 31.1 G/DL (ref 33–37)
MCV RBC AUTO: 91.3 FL (ref 81–99)
NITRITE, URINE: NEGATIVE
PDW BLD-RTO: 13.8 % (ref 11.5–14.5)
PH UA: 7.5
PLATELET # BLD: 340 K/UL (ref 130–400)
PMV BLD AUTO: 10.8 FL (ref 9.4–12.3)
POTASSIUM SERPL-SCNC: 4.2 MMOL/L (ref 3.5–5)
PROTEIN UA: NEGATIVE MG/DL
RBC # BLD: 4.96 M/UL (ref 4.2–5.4)
RBC UA: 7 /HPF (ref 0–4)
SODIUM BLD-SCNC: 143 MMOL/L (ref 136–145)
SPECIFIC GRAVITY UA: 1.02
TOTAL PROTEIN: 7.3 G/DL (ref 6.6–8.7)
TRIGL SERPL-MCNC: 174 MG/DL (ref 0–149)
TSH SERPL DL<=0.05 MIU/L-ACNC: 1.27 UIU/ML (ref 0.27–4.2)
UROBILINOGEN, URINE: 0.2 E.U./DL
WBC # BLD: 9 K/UL (ref 4.8–10.8)
WBC UA: 33 /HPF (ref 0–5)

## 2018-04-23 ENCOUNTER — OFFICE VISIT (OUTPATIENT)
Dept: INTERNAL MEDICINE | Age: 48
End: 2018-04-23
Payer: COMMERCIAL

## 2018-04-23 VITALS
BODY MASS INDEX: 33.04 KG/M2 | RESPIRATION RATE: 18 BRPM | HEART RATE: 110 BPM | SYSTOLIC BLOOD PRESSURE: 118 MMHG | DIASTOLIC BLOOD PRESSURE: 60 MMHG | WEIGHT: 218 LBS | HEIGHT: 68 IN | OXYGEN SATURATION: 98 %

## 2018-04-23 DIAGNOSIS — Z00.00 ANNUAL PHYSICAL EXAM: Primary | ICD-10-CM

## 2018-04-23 DIAGNOSIS — Z78.0 MENOPAUSE: ICD-10-CM

## 2018-04-23 DIAGNOSIS — F41.1 GENERALIZED ANXIETY DISORDER: ICD-10-CM

## 2018-04-23 DIAGNOSIS — E66.09 EXOGENOUS OBESITY: ICD-10-CM

## 2018-04-23 DIAGNOSIS — I10 ESSENTIAL (PRIMARY) HYPERTENSION: ICD-10-CM

## 2018-04-23 DIAGNOSIS — E78.2 MIXED HYPERLIPIDEMIA: ICD-10-CM

## 2018-04-23 DIAGNOSIS — R73.01 IFG (IMPAIRED FASTING GLUCOSE): ICD-10-CM

## 2018-04-23 DIAGNOSIS — R94.5 ABNORMAL LIVER FUNCTION: ICD-10-CM

## 2018-04-23 DIAGNOSIS — R31.29 MICROHEMATURIA: ICD-10-CM

## 2018-04-23 PROBLEM — E78.1 HYPERTRIGLYCERIDEMIA: Status: RESOLVED | Noted: 2017-08-15 | Resolved: 2018-04-23

## 2018-04-23 PROCEDURE — 99396 PREV VISIT EST AGE 40-64: CPT | Performed by: INTERNAL MEDICINE

## 2018-04-23 RX ORDER — BUSPIRONE HYDROCHLORIDE 30 MG/1
TABLET ORAL
Qty: 60 TABLET | Refills: 5 | Status: SHIPPED | OUTPATIENT
Start: 2018-04-23 | End: 2019-02-05 | Stop reason: SDUPTHER

## 2018-04-23 RX ORDER — DESVENLAFAXINE 100 MG/1
TABLET, EXTENDED RELEASE ORAL
Qty: 30 TABLET | Refills: 5 | Status: SHIPPED | OUTPATIENT
Start: 2018-04-23 | End: 2018-10-16 | Stop reason: SDUPTHER

## 2018-04-23 RX ORDER — PHENTERMINE HYDROCHLORIDE 37.5 MG/1
37.5 TABLET ORAL
Qty: 30 TABLET | Refills: 1 | Status: SHIPPED | OUTPATIENT
Start: 2018-04-23 | End: 2018-07-03 | Stop reason: SDUPTHER

## 2018-04-23 ASSESSMENT — ENCOUNTER SYMPTOMS
COUGH: 0
EYE PAIN: 0
VOICE CHANGE: 0
VOMITING: 0
NAUSEA: 0
DIARRHEA: 0
BLOOD IN STOOL: 0
CONSTIPATION: 0
ABDOMINAL PAIN: 0
EYE REDNESS: 0
CHEST TIGHTNESS: 0
COLOR CHANGE: 0
TROUBLE SWALLOWING: 0
SINUS PRESSURE: 0
BACK PAIN: 1
WHEEZING: 0
SORE THROAT: 0

## 2018-04-24 ENCOUNTER — OFFICE VISIT (OUTPATIENT)
Dept: INTERNAL MEDICINE | Age: 48
End: 2018-04-24
Payer: COMMERCIAL

## 2018-04-24 VITALS
SYSTOLIC BLOOD PRESSURE: 118 MMHG | HEIGHT: 68 IN | RESPIRATION RATE: 16 BRPM | BODY MASS INDEX: 33.04 KG/M2 | DIASTOLIC BLOOD PRESSURE: 88 MMHG | HEART RATE: 111 BPM | WEIGHT: 218 LBS | OXYGEN SATURATION: 97 % | TEMPERATURE: 97.4 F

## 2018-04-24 DIAGNOSIS — R52 BODY ACHES: ICD-10-CM

## 2018-04-24 DIAGNOSIS — M25.50 ARTHRALGIA, UNSPECIFIED JOINT: ICD-10-CM

## 2018-04-24 DIAGNOSIS — J02.9 SORETHROAT: Primary | ICD-10-CM

## 2018-04-24 DIAGNOSIS — R94.5 ABNORMAL LIVER FUNCTION: ICD-10-CM

## 2018-04-24 LAB
C-REACTIVE PROTEIN: 3.16 MG/DL (ref 0–0.5)
RHEUMATOID FACTOR: <10 IU/ML
S PYO AG THROAT QL: NORMAL
SEDIMENTATION RATE, ERYTHROCYTE: 29 MM/HR (ref 0–20)

## 2018-04-24 PROCEDURE — 99213 OFFICE O/P EST LOW 20 MIN: CPT | Performed by: NURSE PRACTITIONER

## 2018-04-24 PROCEDURE — 87880 STREP A ASSAY W/OPTIC: CPT | Performed by: NURSE PRACTITIONER

## 2018-04-24 ASSESSMENT — ENCOUNTER SYMPTOMS
STRIDOR: 0
ABDOMINAL PAIN: 0
COLOR CHANGE: 0
BLOOD IN STOOL: 0
SHORTNESS OF BREATH: 0
CHOKING: 0
COUGH: 0
VOMITING: 0
NAUSEA: 0
WHEEZING: 0
EYE ITCHING: 0
EYE DISCHARGE: 0
CONSTIPATION: 0
SORE THROAT: 0
ABDOMINAL DISTENTION: 0
DIARRHEA: 0
TROUBLE SWALLOWING: 0

## 2018-04-30 ENCOUNTER — HOSPITAL ENCOUNTER (OUTPATIENT)
Dept: ULTRASOUND IMAGING | Age: 48
Discharge: HOME | End: 2018-04-30
Payer: COMMERCIAL

## 2018-04-30 ENCOUNTER — HOSPITAL ENCOUNTER (OUTPATIENT)
Dept: WOMENS IMAGING | Age: 48
Discharge: HOME | End: 2018-04-30
Payer: COMMERCIAL

## 2018-04-30 DIAGNOSIS — R94.5 ABNORMAL LIVER FUNCTION: ICD-10-CM

## 2018-04-30 DIAGNOSIS — Z78.0 MENOPAUSE: ICD-10-CM

## 2018-04-30 PROCEDURE — 76705 ECHO EXAM OF ABDOMEN: CPT

## 2018-04-30 PROCEDURE — 77080 DXA BONE DENSITY AXIAL: CPT

## 2018-05-23 PROBLEM — Z12.12 SCREENING FOR COLORECTAL CANCER: Status: RESOLVED | Noted: 2017-10-22 | Resolved: 2018-05-23

## 2018-05-23 PROBLEM — Z00.00 ANNUAL PHYSICAL EXAM: Status: RESOLVED | Noted: 2018-04-23 | Resolved: 2018-05-23

## 2018-05-23 PROBLEM — Z12.11 SCREENING FOR COLORECTAL CANCER: Status: RESOLVED | Noted: 2017-10-22 | Resolved: 2018-05-23

## 2018-05-29 RX ORDER — PHENTERMINE HYDROCHLORIDE 37.5 MG/1
37.5 CAPSULE ORAL EVERY MORNING
Qty: 30 CAPSULE | Refills: 0 | Status: SHIPPED | OUTPATIENT
Start: 2018-05-29 | End: 2018-06-28

## 2018-05-29 RX ORDER — PHENTERMINE HYDROCHLORIDE 37.5 MG/1
37.5 CAPSULE ORAL EVERY MORNING
Qty: 30 CAPSULE | Refills: 0 | OUTPATIENT
Start: 2018-05-29 | End: 2018-06-28

## 2018-07-03 ENCOUNTER — OFFICE VISIT (OUTPATIENT)
Dept: INTERNAL MEDICINE | Age: 48
End: 2018-07-03
Payer: COMMERCIAL

## 2018-07-03 VITALS
HEART RATE: 85 BPM | WEIGHT: 205 LBS | DIASTOLIC BLOOD PRESSURE: 88 MMHG | OXYGEN SATURATION: 98 % | HEIGHT: 68 IN | SYSTOLIC BLOOD PRESSURE: 120 MMHG | RESPIRATION RATE: 18 BRPM | BODY MASS INDEX: 31.07 KG/M2

## 2018-07-03 DIAGNOSIS — R73.01 IFG (IMPAIRED FASTING GLUCOSE): ICD-10-CM

## 2018-07-03 DIAGNOSIS — F41.1 GENERALIZED ANXIETY DISORDER: ICD-10-CM

## 2018-07-03 DIAGNOSIS — E66.09 EXOGENOUS OBESITY: Primary | ICD-10-CM

## 2018-07-03 DIAGNOSIS — I10 ESSENTIAL (PRIMARY) HYPERTENSION: ICD-10-CM

## 2018-07-03 PROCEDURE — 99214 OFFICE O/P EST MOD 30 MIN: CPT | Performed by: INTERNAL MEDICINE

## 2018-07-03 RX ORDER — PHENTERMINE HYDROCHLORIDE 37.5 MG/1
37.5 TABLET ORAL
Qty: 30 TABLET | Refills: 1 | Status: SHIPPED | OUTPATIENT
Start: 2018-07-03 | End: 2018-10-16 | Stop reason: SDUPTHER

## 2018-07-03 RX ORDER — PHENTERMINE HYDROCHLORIDE 37.5 MG/1
37.5 CAPSULE ORAL EVERY MORNING
Qty: 30 CAPSULE | Refills: 0 | Status: CANCELLED | OUTPATIENT
Start: 2018-07-03 | End: 2018-08-02

## 2018-07-03 RX ORDER — METFORMIN HYDROCHLORIDE 500 MG/1
TABLET, EXTENDED RELEASE ORAL
Qty: 60 TABLET | Refills: 3 | Status: SHIPPED | OUTPATIENT
Start: 2018-07-03 | End: 2018-11-14 | Stop reason: SDUPTHER

## 2018-07-03 ASSESSMENT — ENCOUNTER SYMPTOMS
CHEST TIGHTNESS: 0
WHEEZING: 0
CONSTIPATION: 0
SORE THROAT: 0
ABDOMINAL PAIN: 0
COUGH: 0

## 2018-07-03 NOTE — PROGRESS NOTES
Chief Complaint   Patient presents with    Weight Loss     History of presenting illness:  Jos King is a 46 y.o. female who presents today for follow up on her chronic medical conditions as noted below. 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    Generalized anxiety disorder 10/22/2017    IFG (impaired fasting glucose) 10/22/2017    Exogenous obesity 10/22/2017    Psoriasis 10/22/2017    IBS (irritable bowel syndrome) 10/22/2017    History of bone density study      Overview Note:     4/2018 normal      Abnormal liver function 08/15/2017    Foot fracture, left 08/15/2017    Foot pain 08/15/2017    Altered bowel function 10/31/2016     Overview Note:     Updating Deprecated Diagnoses      Elevated blood pressure 10/15/2016    Essential (primary) hypertension 10/15/2016     Past Medical History:   Diagnosis Date    Acquired deviated nasal septum     Anal bleeding 10/31/2016    Ankle injury     Asthma     Benign paroxysmal positional vertigo     Derangement of medial meniscus     L knee mild, clinical diagnosis 9/19/12 after turning foot and popping sound 9/16/12 while walking and turn.     Eustachian tube dysfunction     Excessive sleepiness     During the day    H/O varicose veins     History of sprain of foot     Osteoarthritis     Pain in joint of left knee     Palpitations     Psoriasis       Past Surgical History:   Procedure Laterality Date    APPENDECTOMY      BREAST SURGERY      lift and a left implant    CARPAL TUNNEL RELEASE      visit.  EMR Dragon/transcription disclaimer:Significant part of this  encounter note is electronic transcription/translation of spoken language to printed text. The electronic translation of spoken language may be erroneous, or at times, nonsensical words or phrases may be inadvertently transcribed.  Although I have reviewed the note for such errors, some may still exist.

## 2018-10-16 ENCOUNTER — OFFICE VISIT (OUTPATIENT)
Dept: INTERNAL MEDICINE | Age: 48
End: 2018-10-16
Payer: COMMERCIAL

## 2018-10-16 ENCOUNTER — TELEPHONE (OUTPATIENT)
Dept: INTERNAL MEDICINE | Age: 48
End: 2018-10-16

## 2018-10-16 VITALS
BODY MASS INDEX: 30.46 KG/M2 | SYSTOLIC BLOOD PRESSURE: 118 MMHG | HEIGHT: 68 IN | RESPIRATION RATE: 18 BRPM | WEIGHT: 201 LBS | HEART RATE: 83 BPM | DIASTOLIC BLOOD PRESSURE: 88 MMHG | OXYGEN SATURATION: 96 %

## 2018-10-16 DIAGNOSIS — Z23 NEED FOR IMMUNIZATION AGAINST INFLUENZA: Primary | ICD-10-CM

## 2018-10-16 DIAGNOSIS — R73.01 IFG (IMPAIRED FASTING GLUCOSE): ICD-10-CM

## 2018-10-16 DIAGNOSIS — J21.9 ACUTE BRONCHITIS AND BRONCHIOLITIS: ICD-10-CM

## 2018-10-16 DIAGNOSIS — I10 ESSENTIAL (PRIMARY) HYPERTENSION: ICD-10-CM

## 2018-10-16 DIAGNOSIS — R94.5 ABNORMAL LIVER FUNCTION: ICD-10-CM

## 2018-10-16 DIAGNOSIS — J20.9 ACUTE BRONCHITIS AND BRONCHIOLITIS: ICD-10-CM

## 2018-10-16 DIAGNOSIS — F41.1 GENERALIZED ANXIETY DISORDER: ICD-10-CM

## 2018-10-16 DIAGNOSIS — E66.09 EXOGENOUS OBESITY: ICD-10-CM

## 2018-10-16 LAB
ALBUMIN SERPL-MCNC: 4.4 G/DL (ref 3.5–5.2)
ALP BLD-CCNC: 88 U/L (ref 35–104)
ALT SERPL-CCNC: 31 U/L (ref 5–33)
ANION GAP SERPL CALCULATED.3IONS-SCNC: 14 MMOL/L (ref 7–19)
AST SERPL-CCNC: 25 U/L (ref 5–32)
BASOPHILS ABSOLUTE: 0.1 K/UL (ref 0–0.2)
BASOPHILS RELATIVE PERCENT: 0.4 % (ref 0–1)
BILIRUB SERPL-MCNC: <0.2 MG/DL (ref 0.2–1.2)
BUN BLDV-MCNC: 20 MG/DL (ref 6–20)
C-REACTIVE PROTEIN: 0.21 MG/DL (ref 0–0.5)
CALCIUM SERPL-MCNC: 9.6 MG/DL (ref 8.6–10)
CHLORIDE BLD-SCNC: 99 MMOL/L (ref 98–111)
CO2: 27 MMOL/L (ref 22–29)
CREAT SERPL-MCNC: 0.5 MG/DL (ref 0.5–0.9)
EOSINOPHILS ABSOLUTE: 0.4 K/UL (ref 0–0.6)
EOSINOPHILS RELATIVE PERCENT: 3.3 % (ref 0–5)
GFR NON-AFRICAN AMERICAN: >60
GLUCOSE BLD-MCNC: 82 MG/DL (ref 74–109)
HBA1C MFR BLD: 5.4 % (ref 4–6)
HCT VFR BLD CALC: 44.5 % (ref 37–47)
HEMOGLOBIN: 14 G/DL (ref 12–16)
LYMPHOCYTES ABSOLUTE: 4.1 K/UL (ref 1.1–4.5)
LYMPHOCYTES RELATIVE PERCENT: 31.5 % (ref 20–40)
MCH RBC QN AUTO: 29 PG (ref 27–31)
MCHC RBC AUTO-ENTMCNC: 31.5 G/DL (ref 33–37)
MCV RBC AUTO: 92.3 FL (ref 81–99)
MONOCYTES ABSOLUTE: 1.2 K/UL (ref 0–0.9)
MONOCYTES RELATIVE PERCENT: 9.3 % (ref 0–10)
NEUTROPHILS ABSOLUTE: 7.1 K/UL (ref 1.5–7.5)
NEUTROPHILS RELATIVE PERCENT: 55.2 % (ref 50–65)
PDW BLD-RTO: 14.6 % (ref 11.5–14.5)
PLATELET # BLD: 329 K/UL (ref 130–400)
PMV BLD AUTO: 11.2 FL (ref 9.4–12.3)
POTASSIUM SERPL-SCNC: 3.9 MMOL/L (ref 3.5–5)
RBC # BLD: 4.82 M/UL (ref 4.2–5.4)
SEDIMENTATION RATE, ERYTHROCYTE: 14 MM/HR (ref 0–25)
SODIUM BLD-SCNC: 140 MMOL/L (ref 136–145)
TOTAL PROTEIN: 7.7 G/DL (ref 6.6–8.7)
WBC # BLD: 12.9 K/UL (ref 4.8–10.8)

## 2018-10-16 PROCEDURE — 90471 IMMUNIZATION ADMIN: CPT | Performed by: INTERNAL MEDICINE

## 2018-10-16 PROCEDURE — 99214 OFFICE O/P EST MOD 30 MIN: CPT | Performed by: INTERNAL MEDICINE

## 2018-10-16 PROCEDURE — 90686 IIV4 VACC NO PRSV 0.5 ML IM: CPT | Performed by: INTERNAL MEDICINE

## 2018-10-16 RX ORDER — ALBUTEROL SULFATE 90 UG/1
2 AEROSOL, METERED RESPIRATORY (INHALATION) EVERY 6 HOURS PRN
Qty: 1 INHALER | Refills: 3 | Status: SHIPPED | OUTPATIENT
Start: 2018-10-16 | End: 2020-03-18 | Stop reason: SDUPTHER

## 2018-10-16 RX ORDER — DESVENLAFAXINE 100 MG/1
TABLET, EXTENDED RELEASE ORAL
Qty: 90 TABLET | Refills: 3 | Status: SHIPPED | OUTPATIENT
Start: 2018-10-16 | End: 2019-12-09 | Stop reason: SDUPTHER

## 2018-10-16 RX ORDER — PHENTERMINE HYDROCHLORIDE 37.5 MG/1
37.5 TABLET ORAL
Qty: 30 TABLET | Refills: 1 | Status: SHIPPED | OUTPATIENT
Start: 2018-10-16 | End: 2019-04-17 | Stop reason: SDUPTHER

## 2018-10-16 RX ORDER — CEFUROXIME AXETIL 250 MG/1
250 TABLET ORAL 2 TIMES DAILY
Qty: 16 TABLET | Refills: 0 | Status: SHIPPED | OUTPATIENT
Start: 2018-10-16 | End: 2018-10-26

## 2018-10-16 ASSESSMENT — ENCOUNTER SYMPTOMS
SORE THROAT: 0
CHEST TIGHTNESS: 0
ABDOMINAL PAIN: 0
COUGH: 1
CONSTIPATION: 0
WHEEZING: 0

## 2018-10-16 NOTE — PROGRESS NOTES
CRP 04/24/2018 3.16*           ASSESSMENT/PLAN:  Generalized anxiety disorder-Has had significant increased stress levels as above. Her plan today is  #1 cont Pristiq 100 mg daily prescription sent to date instructions  #2 cont  BuSpar  30 mg every 12 hours, patient can alternate 15-30 mg depending on her needs     Essential (primary) hypertension-her blood pressure has been in good range, at this time no prescription is needed, patient will continue to monitor this closely     IFG- A1c is better 5.4( 5.9), cont  strict diet that is lower in carbohydrates and weight loss is suggested    Bronchitis with cough  Prescription for Ceftin twice a day  Mucinex OTC  Ventolin inhaler  If not better she would need to call      Obesity  Diet and weight loss DW in length with patient. I have advised patient to follow strict lower carbohydrate dietary regimen and engage in  exercise routine for 30-45 minutes at least 3-4  Days per week. Patient was provided with strategies how to  shift from personal maladaptive eating patterns toward healthful eating and exercise. . Behavioral therapy components of self monitoring, goal setting, stimulus control and social support were emphasized. Along with above, I am prescribing this patient  Phentermin (  Her BMI is above 30)  While staying on this short term Phentermin treatment regimen, the goal is to loose at least 1 lbs  of weight per week.  2 month follow up appointment is recommended.       Elevated transaminases earlier 4/2018 ; with weight loss now these are normal alt 31 (104) (41) and ast 25( 56)( 36)  She is on ostezla  * liver us 4/18 ++ fatty liver        ( pt on Otezla for psoriasis + sees dr Keya Carballo for arthralgias)      Need for immunization against influenza  -     INFLUENZA, QUADV, 3 YRS AND OLDER, IM, PF, PREFILL SYR OR SDV, 0.5ML (FLUZONE QUADV, PF)    Other orders  -     desvenlafaxine succinate (PRISTIQ) 100 MG TB24 extended release tablet; TAKE 1 TABLET BY MOUTH ONCE DAILY. Orders Placed This Encounter   Procedures    INFLUENZA, QUADV, 3 YRS AND OLDER, IM, PF, PREFILL SYR OR SDV, 0.5ML (FLUZONE QUADV, PF)    Comprehensive Metabolic Panel    Hemoglobin A1C    Lipid Panel    CBC Auto Differential    Urinalysis    TSH without Reflex     New Prescriptions    ALBUTEROL SULFATE HFA (VENTOLIN HFA) 108 (90 BASE) MCG/ACT INHALER    Inhale 2 puffs into the lungs every 6 hours as needed for Wheezing    CEFUROXIME (CEFTIN) 250 MG TABLET    Take 1 tablet by mouth 2 times daily for 10 days        Return in about 6 months (around 4/16/2019) for Annual Physical.   There are no Patient Instructions on file for this visit. EMR Dragon/transcription disclaimer:Significant part of this  encounter note is electronic transcription/translationof spoken language to printed text. The electronic translation of spoken language may be erroneous, or at times, nonsensical words or phrases may be inadvertently transcribed.  Although I have reviewed the note for sucherrors, some may still exist.

## 2018-11-12 ENCOUNTER — TRANSCRIBE ORDERS (OUTPATIENT)
Dept: ADMINISTRATIVE | Facility: HOSPITAL | Age: 48
End: 2018-11-12

## 2018-11-12 DIAGNOSIS — Z12.39 SCREENING BREAST EXAMINATION: Primary | ICD-10-CM

## 2018-11-14 RX ORDER — METFORMIN HYDROCHLORIDE 500 MG/1
TABLET, EXTENDED RELEASE ORAL
Qty: 60 TABLET | Refills: 5 | Status: SHIPPED | OUTPATIENT
Start: 2018-11-14 | End: 2019-09-17 | Stop reason: SDUPTHER

## 2018-11-14 NOTE — TELEPHONE ENCOUNTER
Rosalba called requesting a refill of the below medication which has been pended for you:     Requested Prescriptions     Pending Prescriptions Disp Refills    metFORMIN (GLUCOPHAGE-XR) 500 MG extended release tablet [Pharmacy Med Name: METFORMIN XR 500MG TAB*DD] 60 tablet 5     Sig: TAKE 1 TABLET BY MOUTH EVERY EVENING FOR 1 WEEK, THEN INCREASE TO 2 TABLETS EVERY EVENING.        Last Appointment Date: 10/16/2018  Next Appointment Date: Visit date not found    No Known Allergies

## 2018-11-16 ENCOUNTER — HOSPITAL ENCOUNTER (OUTPATIENT)
Dept: MAMMOGRAPHY | Facility: HOSPITAL | Age: 48
Discharge: HOME | End: 2018-11-16
Attending: OBSTETRICS & GYNECOLOGY | Admitting: OBSTETRICS & GYNECOLOGY

## 2018-11-16 DIAGNOSIS — Z12.39 SCREENING BREAST EXAMINATION: ICD-10-CM

## 2018-11-16 PROCEDURE — 77067 SCR MAMMO BI INCL CAD: CPT

## 2018-11-16 PROCEDURE — 77063 BREAST TOMOSYNTHESIS BI: CPT

## 2018-12-28 ENCOUNTER — TELEPHONE (OUTPATIENT)
Dept: INTERNAL MEDICINE | Age: 48
End: 2018-12-28

## 2018-12-28 RX ORDER — CEFUROXIME AXETIL 250 MG/1
250 TABLET ORAL 2 TIMES DAILY
Qty: 16 TABLET | Refills: 0 | Status: SHIPPED | OUTPATIENT
Start: 2018-12-28 | End: 2019-01-05

## 2019-01-10 ENCOUNTER — OFFICE VISIT (OUTPATIENT)
Dept: INTERNAL MEDICINE | Age: 49
End: 2019-01-10
Payer: COMMERCIAL

## 2019-01-10 VITALS
TEMPERATURE: 97.8 F | BODY MASS INDEX: 30.62 KG/M2 | SYSTOLIC BLOOD PRESSURE: 138 MMHG | HEIGHT: 68 IN | OXYGEN SATURATION: 99 % | WEIGHT: 202 LBS | DIASTOLIC BLOOD PRESSURE: 84 MMHG | HEART RATE: 81 BPM

## 2019-01-10 DIAGNOSIS — J01.90 ACUTE NON-RECURRENT SINUSITIS, UNSPECIFIED LOCATION: Primary | ICD-10-CM

## 2019-01-10 PROCEDURE — 96372 THER/PROPH/DIAG INJ SC/IM: CPT | Performed by: NURSE PRACTITIONER

## 2019-01-10 PROCEDURE — 99213 OFFICE O/P EST LOW 20 MIN: CPT | Performed by: NURSE PRACTITIONER

## 2019-01-10 RX ORDER — METHYLPREDNISOLONE ACETATE 80 MG/ML
80 INJECTION, SUSPENSION INTRA-ARTICULAR; INTRALESIONAL; INTRAMUSCULAR; SOFT TISSUE ONCE
Status: COMPLETED | OUTPATIENT
Start: 2019-01-10 | End: 2019-01-10

## 2019-01-10 RX ORDER — AMOXICILLIN AND CLAVULANATE POTASSIUM 875; 125 MG/1; MG/1
1 TABLET, FILM COATED ORAL 2 TIMES DAILY
Qty: 28 TABLET | Refills: 0 | Status: SHIPPED | OUTPATIENT
Start: 2019-01-10 | End: 2019-01-24

## 2019-01-10 RX ADMIN — METHYLPREDNISOLONE ACETATE 80 MG: 80 INJECTION, SUSPENSION INTRA-ARTICULAR; INTRALESIONAL; INTRAMUSCULAR; SOFT TISSUE at 16:33

## 2019-01-10 ASSESSMENT — ENCOUNTER SYMPTOMS
PHOTOPHOBIA: 0
COLOR CHANGE: 0
SHORTNESS OF BREATH: 0
NAUSEA: 0
COUGH: 1
VOICE CHANGE: 0
RHINORRHEA: 1
BACK PAIN: 0
VOMITING: 0

## 2019-02-05 RX ORDER — BUSPIRONE HYDROCHLORIDE 30 MG/1
TABLET ORAL
Qty: 60 TABLET | Refills: 1 | Status: SHIPPED | OUTPATIENT
Start: 2019-02-05 | End: 2019-09-17 | Stop reason: CLARIF

## 2019-04-17 ENCOUNTER — OFFICE VISIT (OUTPATIENT)
Dept: INTERNAL MEDICINE | Age: 49
End: 2019-04-17
Payer: COMMERCIAL

## 2019-04-17 VITALS
WEIGHT: 206 LBS | HEART RATE: 78 BPM | DIASTOLIC BLOOD PRESSURE: 88 MMHG | OXYGEN SATURATION: 97 % | HEIGHT: 68 IN | SYSTOLIC BLOOD PRESSURE: 122 MMHG | RESPIRATION RATE: 18 BRPM | BODY MASS INDEX: 31.22 KG/M2

## 2019-04-17 DIAGNOSIS — J20.9 ACUTE BRONCHITIS AND BRONCHIOLITIS: ICD-10-CM

## 2019-04-17 DIAGNOSIS — F41.1 GENERALIZED ANXIETY DISORDER: ICD-10-CM

## 2019-04-17 DIAGNOSIS — E66.09 EXOGENOUS OBESITY: ICD-10-CM

## 2019-04-17 DIAGNOSIS — R94.5 ABNORMAL LIVER FUNCTION: ICD-10-CM

## 2019-04-17 DIAGNOSIS — R73.01 IFG (IMPAIRED FASTING GLUCOSE): ICD-10-CM

## 2019-04-17 DIAGNOSIS — J21.9 ACUTE BRONCHITIS AND BRONCHIOLITIS: ICD-10-CM

## 2019-04-17 DIAGNOSIS — I10 ESSENTIAL (PRIMARY) HYPERTENSION: ICD-10-CM

## 2019-04-17 DIAGNOSIS — Z23 NEED FOR IMMUNIZATION AGAINST INFLUENZA: ICD-10-CM

## 2019-04-17 DIAGNOSIS — E78.2 MIXED HYPERLIPIDEMIA: ICD-10-CM

## 2019-04-17 DIAGNOSIS — Z00.00 ANNUAL PHYSICAL EXAM: Primary | ICD-10-CM

## 2019-04-17 LAB
ALBUMIN SERPL-MCNC: 4.1 G/DL (ref 3.5–5.2)
ALP BLD-CCNC: 78 U/L (ref 35–104)
ALT SERPL-CCNC: 28 U/L (ref 5–33)
ANION GAP SERPL CALCULATED.3IONS-SCNC: 14 MMOL/L (ref 7–19)
AST SERPL-CCNC: 26 U/L (ref 5–32)
BACTERIA: NEGATIVE /HPF
BASOPHILS ABSOLUTE: 0 K/UL (ref 0–0.2)
BASOPHILS RELATIVE PERCENT: 0.3 % (ref 0–1)
BILIRUB SERPL-MCNC: 0.3 MG/DL (ref 0.2–1.2)
BILIRUBIN URINE: NEGATIVE
BLOOD, URINE: NEGATIVE
BUN BLDV-MCNC: 18 MG/DL (ref 6–20)
CALCIUM SERPL-MCNC: 9.9 MG/DL (ref 8.6–10)
CHLORIDE BLD-SCNC: 101 MMOL/L (ref 98–111)
CHOLESTEROL, TOTAL: 188 MG/DL (ref 160–199)
CLARITY: ABNORMAL
CO2: 26 MMOL/L (ref 22–29)
COLOR: YELLOW
CREAT SERPL-MCNC: 0.5 MG/DL (ref 0.5–0.9)
EOSINOPHILS ABSOLUTE: 0.2 K/UL (ref 0–0.6)
EOSINOPHILS RELATIVE PERCENT: 1.3 % (ref 0–5)
EPITHELIAL CELLS, UA: 6 /HPF (ref 0–5)
GFR NON-AFRICAN AMERICAN: >60
GLUCOSE BLD-MCNC: 81 MG/DL (ref 74–109)
GLUCOSE URINE: NEGATIVE MG/DL
HBA1C MFR BLD: 5.1 % (ref 4–6)
HCT VFR BLD CALC: 46.2 % (ref 37–47)
HDLC SERPL-MCNC: 68 MG/DL (ref 65–121)
HEMOGLOBIN: 14.3 G/DL (ref 12–16)
HYALINE CASTS: 2 /HPF (ref 0–8)
KETONES, URINE: NEGATIVE MG/DL
LDL CHOLESTEROL CALCULATED: 106 MG/DL
LEUKOCYTE ESTERASE, URINE: ABNORMAL
LYMPHOCYTES ABSOLUTE: 2.9 K/UL (ref 1.1–4.5)
LYMPHOCYTES RELATIVE PERCENT: 24.5 % (ref 20–40)
MCH RBC QN AUTO: 29.7 PG (ref 27–31)
MCHC RBC AUTO-ENTMCNC: 31 G/DL (ref 33–37)
MCV RBC AUTO: 95.9 FL (ref 81–99)
MONOCYTES ABSOLUTE: 1 K/UL (ref 0–0.9)
MONOCYTES RELATIVE PERCENT: 8.9 % (ref 0–10)
NEUTROPHILS ABSOLUTE: 7.5 K/UL (ref 1.5–7.5)
NEUTROPHILS RELATIVE PERCENT: 64.6 % (ref 50–65)
NITRITE, URINE: NEGATIVE
PDW BLD-RTO: 14.6 % (ref 11.5–14.5)
PH UA: 7.5 (ref 5–8)
PLATELET # BLD: 344 K/UL (ref 130–400)
PMV BLD AUTO: 11.5 FL (ref 9.4–12.3)
POTASSIUM SERPL-SCNC: 4.5 MMOL/L (ref 3.5–5)
PROTEIN UA: 30 MG/DL
RBC # BLD: 4.82 M/UL (ref 4.2–5.4)
RBC UA: 3 /HPF (ref 0–4)
SODIUM BLD-SCNC: 141 MMOL/L (ref 136–145)
SPECIFIC GRAVITY UA: 1.02 (ref 1–1.03)
TOTAL PROTEIN: 7.3 G/DL (ref 6.6–8.7)
TRIGL SERPL-MCNC: 68 MG/DL (ref 0–149)
TSH SERPL DL<=0.05 MIU/L-ACNC: 1.44 UIU/ML (ref 0.27–4.2)
UROBILINOGEN, URINE: 0.2 E.U./DL
WBC # BLD: 11.7 K/UL (ref 4.8–10.8)
WBC UA: 6 /HPF (ref 0–5)

## 2019-04-17 PROCEDURE — 99396 PREV VISIT EST AGE 40-64: CPT | Performed by: INTERNAL MEDICINE

## 2019-04-17 RX ORDER — PHENTERMINE HYDROCHLORIDE 37.5 MG/1
37.5 TABLET ORAL
Qty: 30 TABLET | Refills: 1 | Status: SHIPPED | OUTPATIENT
Start: 2019-04-17 | End: 2019-09-17 | Stop reason: SDUPTHER

## 2019-04-17 RX ORDER — NAPROXEN 500 MG/1
500 TABLET ORAL 2 TIMES DAILY WITH MEALS
COMMUNITY
End: 2019-09-17 | Stop reason: CLARIF

## 2019-04-17 ASSESSMENT — ENCOUNTER SYMPTOMS
SINUS PRESSURE: 0
NAUSEA: 0
DIARRHEA: 0
WHEEZING: 0
ABDOMINAL PAIN: 0
BLOOD IN STOOL: 0
COLOR CHANGE: 0
SORE THROAT: 0
VOMITING: 0
VOICE CHANGE: 0
COUGH: 0
EYE PAIN: 0
TROUBLE SWALLOWING: 0
EYE REDNESS: 0
CHEST TIGHTNESS: 0
CONSTIPATION: 0

## 2019-04-17 NOTE — PROGRESS NOTES
Chief Complaint:   Filiberto Victoria is a 46 y.o. female who presents forcomplete physical exam.    History of Present Illness:      Filiberto Victoria is a 46 y.o. female who presents todayfor wellness visit AND follow up on her chronic medical conditions as noted below. 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    Generalized anxiety disorder 10/22/2017    IFG (impaired fasting glucose) 10/22/2017    Exogenous obesity 10/22/2017    Psoriasis 10/22/2017    IBS (irritable bowel syndrome) 10/22/2017    History of bone density study      4/2018 normal      Abnormal liver function 08/15/2017    Foot fracture, left 08/15/2017    Foot pain 08/15/2017    Altered bowel function 10/31/2016     Updating Deprecated Diagnoses      Elevated blood pressure 10/15/2016    Essential (primary) hypertension 10/15/2016       Past Medical History:   Diagnosis Date    Acquired deviated nasal septum     Anal bleeding 10/31/2016    Ankle injury     Asthma     Benign paroxysmal positional vertigo     Derangement of medial meniscus     L knee mild, clinical diagnosis 9/19/12 after turning foot and popping sound 9/16/12 while walking and turn.     Eustachian tube dysfunction     Excessive sleepiness     During the day    H/O varicose veins     History of sprain of foot     Osteoarthritis     Pain in joint of left knee     Palpitations     Psoriasis        Past Surgical History:   Procedure Laterality Date    APPENDECTOMY      BREAST SURGERY lift and a left implant   Keisha Calderon    ENDOSCOPY, COLON, DIAGNOSTIC      KNEE ARTHROSCOPY Bilateral     [place] COLONOSCOPY FLX DX W/COLLJ SPEC WHEN PFRMD N/A 2016    Dr Martina Costello    SINUS SURGERY      nasal septoplasty/ maxillary antrostomies   [facility] Dr. Anthon Angelucci, [place]       Current Outpatient Medications   Medication Sig Dispense Refill    naproxen (NAPROSYN) 500 MG tablet Take 500 mg by mouth 2 times daily (with meals)      phentermine (ADIPEX-P) 37.5 MG tablet Take 1 tablet by mouth every morning (before breakfast) for 30 days. 30 tablet 1    busPIRone (BUSPAR) 30 MG tablet TAKE 1/2 TO 1 TABLET TWICE DAILY 60 tablet 1    metFORMIN (GLUCOPHAGE-XR) 500 MG extended release tablet TAKE 1 TABLET BY MOUTH EVERY EVENING FOR 1 WEEK, THEN INCREASE TO 2 TABLETS EVERY EVENING. (Patient taking differently: Take 2 Tablets at night) 60 tablet 5    desvenlafaxine succinate (PRISTIQ) 100 MG TB24 extended release tablet TAKE 1 TABLET BY MOUTH ONCE DAILY. 90 tablet 3    albuterol sulfate HFA (VENTOLIN HFA) 108 (90 Base) MCG/ACT inhaler Inhale 2 puffs into the lungs every 6 hours as needed for Wheezing 1 Inhaler 3    Apremilast (OTEZLA) 30 MG TABS Take 30 mg by mouth 2 times daily       No current facility-administered medications for this visit. No Known Allergies    Social History     Socioeconomic History    Marital status:       Spouse name: None    Number of children: None    Years of education: None    Highest education level: None   Occupational History    None   Social Needs    Financial resource strain: None    Food insecurity:     Worry: None     Inability: None    Transportation needs:     Medical: None     Non-medical: None   Tobacco Use    Smoking status: Former Smoker     Last attempt to quit:      Years since quittin.3    Smokeless tobacco: Never Used Substance and Sexual Activity    Alcohol use: Yes     Comment: occ    Drug use: No    Sexual activity: None   Lifestyle    Physical activity:     Days per week: None     Minutes per session: None    Stress: None   Relationships    Social connections:     Talks on phone: None     Gets together: None     Attends Anabaptist service: None     Active member of club or organization: None     Attends meetings of clubs or organizations: None     Relationship status: None    Intimate partner violence:     Fear of current or ex partner: None     Emotionally abused: None     Physically abused: None     Forced sexual activity: None   Other Topics Concern    None   Social History Narrative    None     Family History   Problem Relation Age of Onset    Colon Cancer Neg Hx     Colon Polyps Neg Hx     Esophageal Cancer Neg Hx     Liver Cancer Neg Hx     Liver Disease Neg Hx     Stomach Cancer Neg Hx     Rectal Cancer Neg Hx           Past Surgical History:   Procedure Laterality Date    APPENDECTOMY      BREAST SURGERY      lift and a left implant   [facility] Dr. Marylene Clack    ENDOSCOPY, COLON, DIAGNOSTIC      KNEE ARTHROSCOPY Bilateral     [place] COLONOSCOPY FLX DX W/COLLJ SPEC WHEN PFRMD N/A 12/19/2016    Dr Estuardo Islas    SINUS SURGERY      nasal septoplasty/ maxillary antrostomies   Truddie Lights    Dr. Edi Barroso, [place]         Lab Review   No visits with results within 2 Month(s) from this visit. Latest known visit with results is:   Orders Only on 10/16/2018   Component Date Value    Sed Rate 10/16/2018 14          Review of Systems   Constitutional: Negative for chills, fatigue and fever. HENT: Negative for congestion, ear pain, postnasal drip, sinus pressure, sore throat, trouble swallowing and voice change. Eyes: Negative for pain, redness and visual disturbance.    Respiratory: Negative for cough, chest tightness and wheezing. Cardiovascular: Negative for chest pain, palpitations and leg swelling. Gastrointestinal: Negative for abdominal pain, blood in stool, constipation, diarrhea, nausea and vomiting. Endocrine: Negative for polydipsia and polyuria. Genitourinary: Negative for dysuria, enuresis, flank pain, frequency and urgency. Musculoskeletal: Negative for arthralgias, gait problem and joint swelling. Skin: Negative for color change and rash. Neurological: Negative for dizziness, tremors, syncope, facial asymmetry, speech difficulty, weakness, numbness and headaches. Psychiatric/Behavioral: Negative for agitation, behavioral problems, confusion, sleep disturbance and suicidal ideas. The patient is nervous/anxious. Vitals:    04/17/19 0857   BP: 122/88   Site: Left Upper Arm   Position: Sitting   Cuff Size: Large Adult   Pulse: 78   Resp: 18   SpO2: 97%   Weight: 206 lb (93.4 kg)   Height: 5' 8\" (1.727 m)      Wt Readings from Last 3 Encounters:   04/17/19 206 lb (93.4 kg)   01/10/19 202 lb (91.6 kg)   10/16/18 201 lb (91.2 kg)   Body mass index is 31.32 kg/m². BP Readings from Last 3 Encounters:   04/17/19 122/88   01/10/19 138/84   10/16/18 118/88       Physical Exam   Constitutional: She is oriented to person, place, and time. She appears well-developed and well-nourished. HENT:   Head: Normocephalic and atraumatic. Right Ear: External ear normal.   Left Ear: External ear normal.   Mouth/Throat: Oropharynx is clear and moist.   Eyes: Pupils are equal, round, and reactive to light. Conjunctivae are normal. No scleral icterus. Neck: Normal range of motion. Neck supple. No JVD present. No thyromegaly present. Cardiovascular: Normal rate, regular rhythm and normal heart sounds. No murmur heard. Pulmonary/Chest: Effort normal and breath sounds normal. No respiratory distress. She has no wheezes. She exhibits no tenderness. Abdominal: Soft.  Bowel sounds are normal. She exhibits no mass. There is no tenderness. Musculoskeletal: Normal range of motion. She exhibits no edema or tenderness. Lymphadenopathy:     She has no cervical adenopathy. Neurological: She is alert and oriented to person, place, and time. She has normal reflexes. No cranial nerve deficit. Coordination normal.   Skin: Skin is warm and dry. No rash noted. No erythema. Psychiatric: She has a normal mood and affect. Her behavior is normal.         ASSESSMENT/PLAN  ANNUAL PHYSICAL  * Pap and pelvic + mammogram as per gynecology  * Colonoscopy 2016/ repeat 5 yrs  * Bone density 4/18 nl     Generalized anxiety disorder-Has had significant increased stress levels as above. Her plan today is  #1 cont Pristiq 100 mg daily   #2 cont  BuSpar  30 mg every 12 hours, patient can alternate 15-30 mg depending on her needs     Essential (primary) hypertension-her blood pressure has been in good range, at this time no prescription is needed, patient will continue to monitor this closely     IFG- A1c better 5.1 ( 5.4) (5.9), strict diet that is lower in carbohydrates and weight loss is suggested     Elevated transaminases alt 104 (41) and ast 56( 36) spring 2018  Nl 10/18 and today  4/2019 also nl     Mixed hyperlipidemia, mild elevated LDL- improved 106 (120) = lower fat diet and weight loss is recommended     Obesity-  Diet and weight loss DW in length with patient. I have advised patient to follow strict lower carbohydrate dietary regimen and engage in  exercise routine for 30-45 minutes at least 3-4  Days per week. Patient was provided with strategies how to  shift from personal maladaptive eating patterns toward healthful eating and exercise. . Behavioral therapy components of self monitoring, goal setting, stimulus control and social support were emphasized.   Along with above, I am prescribing this patient  Phentermin (  Her BMI is above 30)  While staying on this short term Phentermin treatment regimen, the goal is to

## 2019-04-17 NOTE — LETTER
MEDICATION AGREEMENT     Rosalba Villalobos  [birth year]      For certain conditions, multiple classes of medications may be used to help better manage your symptoms, and to improve your ability to function at home, work and in social settings. However, these medications do have risks, which will be discussed with you, including addiction and dependency. The following prescribed medications need frequent monitoring and will require you to partner and assist in your healthcare. Medication  Dose, instructions and quantity as indicated on current prescription bottle Diagnosis/Reason(s) for Taking Category     Phentermine    37.5mg                            Benefits and goals of Controlled Substance Medications: There are two potential goals for your treatment: (1) decreased pain and suffering (2) improved daily life functions. There are many possible treatments for your chronic condition(s), and, in addition to controlled substance medications, we will try alternatives such as physical therapy, yoga, massage, home daily exercise, meditation, relaxation techniques, injections, chiropractic manipulations, surgery, cognitive therapy, hypnosis and many medications that are not habit-forming. Use of controlled substance medications may be helpful, but they are unlikely to resolve all of your symptoms or restore all function. Risks of Controlled Substance Medications:    Opioid pain medications: These medications can lead to problems such as addiction/dependence, sedation, lightheadedness/dizziness, memory issues, falls, constipation, nausea, or vomiting. They may also impair the ability to drive or operate machinery. Additionally, these medications may lower testosterone levels, leading to loss of bone strength, stamina and sex drive.   They may cause problems with breathing, sleep apnea and reduced coughing, which are especially dangerous for patients with lung supplements and oral decongestants. Dependence withdrawal symptoms may include depressed mood, loss of interest, suicidal thoughts, anxiety, fatigue, appetite changes and agitation. Testosterone replacement therapy:  Potential side effects include increased risk of stroke and heart attack, blood clots, increased blood pressure, increased cholesterol, enlarged prostate, sleep apnea, irritability/aggression and other mood disorders, and decreased fertility. Other:     1. I understand that I have the following responsibilities:  · I will take medications at the dose and frequency prescribed. · I will not increase or change how I take my medications without the approval of the health care provider who signs this Medication Agreement. · I will arrange for refills at the prescribed interval ONLY during regular office hours. I will not ask for refills earlier than agreed, after-hours, on holidays or on weekends. · I will obtain all refills for these medications at  ·  ____________________________________  pharmacy (phone number  ·  ________________________), with full consent for my provider and pharmacist to exchange information in writing or verbally. · I will not request any pain medications or controlled substances from other providers and will inform this provider of all other medications I am taking. · I will inform my other health care providers that I am taking these medications and of the existence of this [address]. In the event of an emergency, I will provide the same information to the emergency department providers. · I will protect my prescriptions and medications. I understand that lost or misplaced prescriptions will not be replaced. · I will keep medications only for my own use and will not share them with others. I will keep all medications away from children. · I agree to participate in any medical, psychological or psychiatric assessments recommended by my provider. · I will actively participate in any program designed to improve function, including social, physical, psychological and daily or work activities. 2. I will not use illegal or street drugs or another person's prescription. If I have an addiction problem with drugs or alcohol and my provider asks me to enter a program to address this issue, I agree to follow through. Such programs may include:  · 12-Step program and securing a sponsor  · Individual counseling   · Inpatient or outpatient treatment  · Other:_____________________________________________________________________________________________________________________________________________    If in treatment, I will request that a copy of the programs initial evaluation and treatment recommendations be sent to this provider and will not expect refills until that is received. I will also request written monthly updates be sent to this provider to verify my continuing treatment. 3. I will consent to drug screening upon my providers request to assure I am only taking the prescribed drugs, described in this MEDICATION AGREEMENT. I understand that a drug screen is a laboratory test in which a sample of my urine, blood or saliva is checked to see what drugs I have been taking. 4. I agree that I will treat the providers and staff at this office with respect at all times. I will keep all of my scheduled appointments, but if I need to cancel my appointment, I will do so a minimum of 24 hours before it is scheduled. 5. I understand that this provider may stop prescribing the medications listed if:  · I do not show any improvement in pain, or my activity has not improved. · I develop rapid tolerance or loss of improvement, as described in my treatment plan. · I develop significant side effects from the medication.   · My behavior is inconsistent with the responsibilities outlined above, which may also result in my being prevented from receiving further care from this office. · Other:____________________________________________________________________    AGREEMENT:    I have read the above and have had all of my questions answered. For chronic disease management, I know that my symptoms can be managed with many types of treatments. A chronic medication trial may be part of my treatment, but I must be an active participant in my care. Medication therapy is only one part of my symptom management plan. In some cases, there may be limited scientific evidence to support the chronic use of certain medications to improve symptoms and daily function. Furthermore, in certain circumstances, there may be scientific information that suggests that use of chronic controlled substances may actually worsen my symptoms and increase my risk of unintentional death directly related to this medication therapy. I know that if my provider feels my risk from controlled medications is greater than my benefit, I will have my controlled substance medication(s) compassionately lowered or removed altogether. I agree to a controlled substance medication trial.      I further agree to allow this office to contact my HIPAA contact on file if there are concerns about my safety and use of controlled medications. I have agreed to use the following medications above as instructed by my physician and as stated in this [address].      Patient Signature:  ______________________  Date:4/17/2019 or _____________    Provider Signature:______________________  Date:4/17/2019 or _____________

## 2019-06-05 RX ORDER — BUSPIRONE HYDROCHLORIDE 15 MG/1
TABLET ORAL
Qty: 180 TABLET | Refills: 1 | Status: SHIPPED | OUTPATIENT
Start: 2019-06-05 | End: 2019-10-18 | Stop reason: SDUPTHER

## 2019-06-05 NOTE — TELEPHONE ENCOUNTER
Rosalba called requesting a refill of the below medication which has been pended for you:     Requested Prescriptions     Pending Prescriptions Disp Refills    busPIRone (BUSPAR) 15 MG tablet [Pharmacy Med Name: BUSPIRONE HCL 15 MG TABLET] 98 tablet 0     Sig: TAKE 1 TO 2 TABLETS BY MOUTH TWICE DAILY.        Last Appointment Date: 4/17/2019  Next Appointment Date: 10/16/2019    No Known Allergies

## 2019-06-19 RX ORDER — METFORMIN HYDROCHLORIDE 500 MG/1
TABLET, EXTENDED RELEASE ORAL
Qty: 60 TABLET | Refills: 0 | Status: SHIPPED | OUTPATIENT
Start: 2019-06-19 | End: 2019-09-17 | Stop reason: CLARIF

## 2019-06-19 NOTE — TELEPHONE ENCOUNTER
Rosalba called requesting a refill of the below medication which has been pended for you:     Requested Prescriptions     Pending Prescriptions Disp Refills    metFORMIN (GLUCOPHAGE-XR) 500 MG extended release tablet [Pharmacy Med Name: METFORMIN XR 500MG TAB*DD] 60 tablet 0     Sig: TAKE 1 TABLET BY MOUTH EVERY EVENING FOR 1 WEEK, THEN INCREASE TO 2 TABLETS EVERY EVENING.        Last Appointment Date: 4/17/2019  Next Appointment Date: 10/16/2019    No Known Allergies

## 2019-07-20 ENCOUNTER — APPOINTMENT (OUTPATIENT)
Dept: CT IMAGING | Age: 49
End: 2019-07-20
Payer: COMMERCIAL

## 2019-07-20 ENCOUNTER — HOSPITAL ENCOUNTER (EMERGENCY)
Age: 49
Discharge: HOME | End: 2019-07-20
Payer: COMMERCIAL

## 2019-07-20 VITALS
TEMPERATURE: 98.8 F | WEIGHT: 200 LBS | OXYGEN SATURATION: 98 % | HEART RATE: 90 BPM | SYSTOLIC BLOOD PRESSURE: 132 MMHG | HEIGHT: 68 IN | BODY MASS INDEX: 30.31 KG/M2 | RESPIRATION RATE: 17 BRPM | DIASTOLIC BLOOD PRESSURE: 80 MMHG

## 2019-07-20 DIAGNOSIS — S86.811A STRAIN OF CALF MUSCLE, RIGHT, INITIAL ENCOUNTER: Primary | ICD-10-CM

## 2019-07-20 PROCEDURE — 99283 EMERGENCY DEPT VISIT LOW MDM: CPT

## 2019-07-20 PROCEDURE — 99284 EMERGENCY DEPT VISIT MOD MDM: CPT | Performed by: NURSE PRACTITIONER

## 2019-07-20 PROCEDURE — 96374 THER/PROPH/DIAG INJ IV PUSH: CPT

## 2019-07-20 PROCEDURE — 6360000002 HC RX W HCPCS: Performed by: NURSE PRACTITIONER

## 2019-07-20 PROCEDURE — 2580000003 HC RX 258: Performed by: NURSE PRACTITIONER

## 2019-07-20 PROCEDURE — 73700 CT LOWER EXTREMITY W/O DYE: CPT

## 2019-07-20 RX ORDER — ONDANSETRON 2 MG/ML
4 INJECTION INTRAMUSCULAR; INTRAVENOUS ONCE
Status: COMPLETED | OUTPATIENT
Start: 2019-07-20 | End: 2019-07-20

## 2019-07-20 RX ORDER — KETOROLAC TROMETHAMINE 30 MG/ML
30 INJECTION, SOLUTION INTRAMUSCULAR; INTRAVENOUS ONCE
Status: COMPLETED | OUTPATIENT
Start: 2019-07-20 | End: 2019-07-20

## 2019-07-20 RX ORDER — 0.9 % SODIUM CHLORIDE 0.9 %
1000 INTRAVENOUS SOLUTION INTRAVENOUS ONCE
Status: COMPLETED | OUTPATIENT
Start: 2019-07-20 | End: 2019-07-20

## 2019-07-20 RX ORDER — NAPROXEN 500 MG/1
500 TABLET ORAL 2 TIMES DAILY
Qty: 20 TABLET | Refills: 0 | Status: SHIPPED | OUTPATIENT
Start: 2019-07-20 | End: 2022-10-31

## 2019-07-20 RX ADMIN — SODIUM CHLORIDE 1000 ML: 9 INJECTION, SOLUTION INTRAVENOUS at 19:58

## 2019-07-20 RX ADMIN — ONDANSETRON 4 MG: 2 INJECTION INTRAMUSCULAR; INTRAVENOUS at 19:58

## 2019-07-20 RX ADMIN — KETOROLAC TROMETHAMINE 30 MG: 30 INJECTION, SOLUTION INTRAMUSCULAR; INTRAVENOUS at 19:58

## 2019-07-20 ASSESSMENT — PAIN SCALES - GENERAL
PAINLEVEL_OUTOF10: 4
PAINLEVEL_OUTOF10: 6

## 2019-09-17 ENCOUNTER — OFFICE VISIT (OUTPATIENT)
Dept: INTERNAL MEDICINE | Age: 49
End: 2019-09-17
Payer: COMMERCIAL

## 2019-09-17 VITALS
HEIGHT: 68 IN | BODY MASS INDEX: 32.28 KG/M2 | SYSTOLIC BLOOD PRESSURE: 130 MMHG | RESPIRATION RATE: 18 BRPM | HEART RATE: 75 BPM | OXYGEN SATURATION: 98 % | TEMPERATURE: 97.7 F | DIASTOLIC BLOOD PRESSURE: 80 MMHG | WEIGHT: 213 LBS

## 2019-09-17 DIAGNOSIS — R05.9 COUGH: Primary | ICD-10-CM

## 2019-09-17 DIAGNOSIS — I10 ESSENTIAL (PRIMARY) HYPERTENSION: ICD-10-CM

## 2019-09-17 DIAGNOSIS — E66.09 EXOGENOUS OBESITY: ICD-10-CM

## 2019-09-17 DIAGNOSIS — R73.01 IFG (IMPAIRED FASTING GLUCOSE): ICD-10-CM

## 2019-09-17 DIAGNOSIS — F41.1 GENERALIZED ANXIETY DISORDER: ICD-10-CM

## 2019-09-17 PROCEDURE — 99214 OFFICE O/P EST MOD 30 MIN: CPT | Performed by: INTERNAL MEDICINE

## 2019-09-17 RX ORDER — METFORMIN HYDROCHLORIDE 500 MG/1
1000 TABLET, EXTENDED RELEASE ORAL
Qty: 60 TABLET | Refills: 5 | Status: SHIPPED | OUTPATIENT
Start: 2019-09-17 | End: 2020-05-08

## 2019-09-17 RX ORDER — PHENTERMINE HYDROCHLORIDE 37.5 MG/1
37.5 TABLET ORAL
Qty: 30 TABLET | Refills: 1 | Status: SHIPPED | OUTPATIENT
Start: 2019-09-17 | End: 2019-10-17

## 2019-09-17 ASSESSMENT — ENCOUNTER SYMPTOMS
ABDOMINAL PAIN: 0
COUGH: 1
CHEST TIGHTNESS: 0
CONSTIPATION: 0
SORE THROAT: 0
WHEEZING: 0

## 2019-09-17 NOTE — PROGRESS NOTES
Chief Complaint   Patient presents with    Cough     for about 3 weeks. also wants to get back on metformin got weight loss     History of presenting illness:  Franck Chau is a48 y.o. female who presents today for follow up on her chronic medical conditions as noted below. 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 Mariel Nguyen/ on 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    Generalized anxiety disorder 10/22/2017    IFG (impaired fasting glucose) 10/22/2017    Exogenous obesity 10/22/2017    Psoriasis 10/22/2017    IBS (irritable bowel syndrome) 10/22/2017    History of bone density study      Overview Note:     4/2018 normal      Abnormal liver function 08/15/2017    Foot fracture, left 08/15/2017    Foot pain 08/15/2017    Altered bowel function 10/31/2016     Overview Note:     Updating Deprecated Diagnoses      Elevated blood pressure 10/15/2016    Essential (primary) hypertension 10/15/2016     Past Medical History:   Diagnosis Date    Acquired deviated nasal septum     Anal bleeding 10/31/2016    Ankle injury     Asthma     Benign paroxysmal positional vertigo     Derangement of medial meniscus     L knee mild, clinical diagnosis 9/19/12 after turning foot and popping sound 9/16/12 while walking and turn.     Eustachian tube dysfunction     Excessive sleepiness     During the day    Generalized anxiety disorder     H/O varicose veins     History of sprain of

## 2019-10-18 ENCOUNTER — OFFICE VISIT (OUTPATIENT)
Dept: INTERNAL MEDICINE | Age: 49
End: 2019-10-18
Payer: COMMERCIAL

## 2019-10-18 VITALS
SYSTOLIC BLOOD PRESSURE: 120 MMHG | HEART RATE: 98 BPM | HEIGHT: 68 IN | WEIGHT: 210 LBS | OXYGEN SATURATION: 98 % | BODY MASS INDEX: 31.83 KG/M2 | DIASTOLIC BLOOD PRESSURE: 84 MMHG

## 2019-10-18 DIAGNOSIS — F41.1 GENERALIZED ANXIETY DISORDER: ICD-10-CM

## 2019-10-18 DIAGNOSIS — R53.83 OTHER FATIGUE: ICD-10-CM

## 2019-10-18 DIAGNOSIS — L65.9 HAIR LOSS: ICD-10-CM

## 2019-10-18 DIAGNOSIS — H00.015 HORDEOLUM EXTERNUM LEFT LOWER EYELID: Primary | ICD-10-CM

## 2019-10-18 DIAGNOSIS — I10 ESSENTIAL (PRIMARY) HYPERTENSION: ICD-10-CM

## 2019-10-18 LAB
T4 FREE: 1.2 NG/DL (ref 0.9–1.7)
TSH SERPL DL<=0.05 MIU/L-ACNC: 1.28 UIU/ML (ref 0.27–4.2)

## 2019-10-18 PROCEDURE — 99214 OFFICE O/P EST MOD 30 MIN: CPT | Performed by: INTERNAL MEDICINE

## 2019-10-18 RX ORDER — TOBRAMYCIN 3 MG/ML
1 SOLUTION/ DROPS OPHTHALMIC EVERY 4 HOURS
Qty: 1 BOTTLE | Refills: 1 | Status: SHIPPED | OUTPATIENT
Start: 2019-10-18 | End: 2019-10-28

## 2019-10-18 RX ORDER — BUSPIRONE HYDROCHLORIDE 15 MG/1
TABLET ORAL
Qty: 180 TABLET | Refills: 1 | Status: SHIPPED | OUTPATIENT
Start: 2019-10-18 | End: 2020-04-14

## 2019-10-18 ASSESSMENT — ENCOUNTER SYMPTOMS
EYE ITCHING: 1
SORE THROAT: 0
CHEST TIGHTNESS: 0
COUGH: 0
ABDOMINAL PAIN: 0
CONSTIPATION: 0
WHEEZING: 0
EYE REDNESS: 1

## 2019-10-21 ENCOUNTER — TELEPHONE (OUTPATIENT)
Dept: INTERNAL MEDICINE | Age: 49
End: 2019-10-21

## 2019-11-15 DIAGNOSIS — Z00.00 ANNUAL PHYSICAL EXAM: ICD-10-CM

## 2019-11-15 DIAGNOSIS — R73.01 IFG (IMPAIRED FASTING GLUCOSE): ICD-10-CM

## 2019-11-15 DIAGNOSIS — E78.2 MIXED HYPERLIPIDEMIA: ICD-10-CM

## 2019-11-15 DIAGNOSIS — F41.1 GENERALIZED ANXIETY DISORDER: ICD-10-CM

## 2019-11-15 DIAGNOSIS — E66.09 EXOGENOUS OBESITY: ICD-10-CM

## 2019-11-15 DIAGNOSIS — I10 ESSENTIAL (PRIMARY) HYPERTENSION: ICD-10-CM

## 2019-11-15 LAB
ALBUMIN SERPL-MCNC: 4.1 G/DL (ref 3.5–5.2)
ALP BLD-CCNC: 86 U/L (ref 35–104)
ALT SERPL-CCNC: 31 U/L (ref 5–33)
ANION GAP SERPL CALCULATED.3IONS-SCNC: 12 MMOL/L (ref 7–19)
AST SERPL-CCNC: 28 U/L (ref 5–32)
BILIRUB SERPL-MCNC: 0.3 MG/DL (ref 0.2–1.2)
BUN BLDV-MCNC: 13 MG/DL (ref 6–20)
CALCIUM SERPL-MCNC: 9.5 MG/DL (ref 8.6–10)
CHLORIDE BLD-SCNC: 100 MMOL/L (ref 98–111)
CHOLESTEROL, TOTAL: 196 MG/DL (ref 160–199)
CO2: 29 MMOL/L (ref 22–29)
CREAT SERPL-MCNC: 0.5 MG/DL (ref 0.5–0.9)
GFR NON-AFRICAN AMERICAN: >60
GLUCOSE BLD-MCNC: 103 MG/DL (ref 74–109)
HBA1C MFR BLD: 5.4 % (ref 4–6)
HDLC SERPL-MCNC: 62 MG/DL (ref 65–121)
LDL CHOLESTEROL CALCULATED: 111 MG/DL
POTASSIUM SERPL-SCNC: 4.6 MMOL/L (ref 3.5–5)
SODIUM BLD-SCNC: 141 MMOL/L (ref 136–145)
TOTAL PROTEIN: 7.2 G/DL (ref 6.6–8.7)
TRIGL SERPL-MCNC: 114 MG/DL (ref 0–149)

## 2019-11-19 ENCOUNTER — OFFICE VISIT (OUTPATIENT)
Dept: INTERNAL MEDICINE | Age: 49
End: 2019-11-19
Payer: COMMERCIAL

## 2019-11-19 VITALS
BODY MASS INDEX: 31.22 KG/M2 | RESPIRATION RATE: 20 BRPM | SYSTOLIC BLOOD PRESSURE: 108 MMHG | WEIGHT: 206 LBS | HEIGHT: 68 IN | OXYGEN SATURATION: 98 % | HEART RATE: 115 BPM | DIASTOLIC BLOOD PRESSURE: 76 MMHG

## 2019-11-19 DIAGNOSIS — F41.1 GENERALIZED ANXIETY DISORDER: ICD-10-CM

## 2019-11-19 DIAGNOSIS — R73.01 IFG (IMPAIRED FASTING GLUCOSE): Primary | ICD-10-CM

## 2019-11-19 DIAGNOSIS — E66.09 EXOGENOUS OBESITY: ICD-10-CM

## 2019-11-19 DIAGNOSIS — I10 ESSENTIAL (PRIMARY) HYPERTENSION: ICD-10-CM

## 2019-11-19 DIAGNOSIS — Z23 NEED FOR VACCINATION: ICD-10-CM

## 2019-11-19 DIAGNOSIS — E78.2 MIXED HYPERLIPIDEMIA: ICD-10-CM

## 2019-11-19 PROCEDURE — 90686 IIV4 VACC NO PRSV 0.5 ML IM: CPT | Performed by: INTERNAL MEDICINE

## 2019-11-19 PROCEDURE — 99214 OFFICE O/P EST MOD 30 MIN: CPT | Performed by: INTERNAL MEDICINE

## 2019-11-19 PROCEDURE — 90471 IMMUNIZATION ADMIN: CPT | Performed by: INTERNAL MEDICINE

## 2019-11-19 RX ORDER — PHENTERMINE HYDROCHLORIDE 37.5 MG/1
37.5 TABLET ORAL
Qty: 30 TABLET | Refills: 1 | Status: SHIPPED | OUTPATIENT
Start: 2019-11-19 | End: 2019-12-19

## 2019-11-19 ASSESSMENT — ENCOUNTER SYMPTOMS
ABDOMINAL PAIN: 0
CONSTIPATION: 0
CHEST TIGHTNESS: 0
COUGH: 0
WHEEZING: 0
SORE THROAT: 0

## 2019-12-09 RX ORDER — DESVENLAFAXINE 100 MG/1
TABLET, EXTENDED RELEASE ORAL
Qty: 90 TABLET | Refills: 0 | Status: SHIPPED | OUTPATIENT
Start: 2019-12-09 | End: 2020-03-05

## 2019-12-13 ENCOUNTER — OFFICE VISIT (OUTPATIENT)
Dept: URGENT CARE | Age: 49
End: 2019-12-13
Payer: COMMERCIAL

## 2019-12-13 VITALS
RESPIRATION RATE: 18 BRPM | WEIGHT: 203 LBS | BODY MASS INDEX: 30.77 KG/M2 | SYSTOLIC BLOOD PRESSURE: 128 MMHG | OXYGEN SATURATION: 98 % | HEART RATE: 118 BPM | TEMPERATURE: 96.8 F | DIASTOLIC BLOOD PRESSURE: 77 MMHG | HEIGHT: 68 IN

## 2019-12-13 DIAGNOSIS — J02.9 ACUTE VIRAL PHARYNGITIS: Primary | ICD-10-CM

## 2019-12-13 DIAGNOSIS — J02.9 SORE THROAT: ICD-10-CM

## 2019-12-13 LAB — S PYO AG THROAT QL: NORMAL

## 2019-12-13 PROCEDURE — 99213 OFFICE O/P EST LOW 20 MIN: CPT | Performed by: SPECIALIST

## 2019-12-13 PROCEDURE — 87880 STREP A ASSAY W/OPTIC: CPT | Performed by: SPECIALIST

## 2019-12-13 PROCEDURE — 96372 THER/PROPH/DIAG INJ SC/IM: CPT | Performed by: SPECIALIST

## 2019-12-13 RX ORDER — DEXAMETHASONE SODIUM PHOSPHATE 10 MG/ML
10 INJECTION INTRAMUSCULAR; INTRAVENOUS ONCE
Status: COMPLETED | OUTPATIENT
Start: 2019-12-13 | End: 2019-12-13

## 2019-12-13 RX ADMIN — DEXAMETHASONE SODIUM PHOSPHATE 10 MG: 10 INJECTION INTRAMUSCULAR; INTRAVENOUS at 08:46

## 2019-12-13 ASSESSMENT — ENCOUNTER SYMPTOMS
VOICE CHANGE: 1
SORE THROAT: 1
SWOLLEN GLANDS: 0
GASTROINTESTINAL NEGATIVE: 1
COUGH: 1

## 2020-01-05 ENCOUNTER — OFFICE VISIT (OUTPATIENT)
Dept: URGENT CARE | Age: 50
End: 2020-01-05
Payer: COMMERCIAL

## 2020-01-05 VITALS
SYSTOLIC BLOOD PRESSURE: 110 MMHG | RESPIRATION RATE: 18 BRPM | OXYGEN SATURATION: 98 % | WEIGHT: 199 LBS | BODY MASS INDEX: 30.16 KG/M2 | HEIGHT: 68 IN | TEMPERATURE: 97.6 F | HEART RATE: 86 BPM | DIASTOLIC BLOOD PRESSURE: 73 MMHG

## 2020-01-05 PROCEDURE — 99213 OFFICE O/P EST LOW 20 MIN: CPT | Performed by: FAMILY MEDICINE

## 2020-01-05 RX ORDER — PHENTERMINE HYDROCHLORIDE 37.5 MG/1
37.5 TABLET ORAL
COMMUNITY
End: 2020-01-21 | Stop reason: SDUPTHER

## 2020-01-05 RX ORDER — POLYMYXIN B SULFATE AND TRIMETHOPRIM 1; 10000 MG/ML; [USP'U]/ML
1 SOLUTION OPHTHALMIC EVERY 4 HOURS
Qty: 1 BOTTLE | Refills: 0 | Status: SHIPPED | OUTPATIENT
Start: 2020-01-05 | End: 2020-01-12

## 2020-01-05 ASSESSMENT — ENCOUNTER SYMPTOMS
NAUSEA: 0
SHORTNESS OF BREATH: 0
EYE ITCHING: 1
COUGH: 0
EYE DISCHARGE: 1
RHINORRHEA: 0
DIARRHEA: 0
CONSTIPATION: 0
SORE THROAT: 0
VOMITING: 0
ABDOMINAL PAIN: 0
EYE PAIN: 1
EYE REDNESS: 1

## 2020-01-05 NOTE — PROGRESS NOTES
Arcelia Nolasco is a 44 y.o. female who presents today for   Chief Complaint   Patient presents with    Eye Problem     left eye redness, itchiness and watery       Chief Complaint: Left eye problems    HPI  Patient reports that she woke up this morning with her left eye itching watering with some purulent drainage. She states that she was concerned about pinkeye because she does have a coworker that currently has pinkeye that she has been in contact with. She has used some over-the-counter eyedrops to allow her to get a little discomfort from the irritation in her eye but otherwise has not been doing anything or take anything in efforts to improve her symptoms. She denies any other specific aggravating or alleviating factors for symptoms. She denies any fever or other sick symptoms. Review of Systems   Constitutional: Negative for activity change, appetite change and fever. HENT: Negative for congestion, rhinorrhea and sore throat. Eyes: Positive for pain, discharge, redness and itching. Respiratory: Negative for cough and shortness of breath. Cardiovascular: Negative for chest pain and palpitations. Gastrointestinal: Negative for abdominal pain, constipation, diarrhea, nausea and vomiting. Endocrine: Negative for cold intolerance and heat intolerance. Genitourinary: Negative for dysuria and hematuria. Musculoskeletal: Negative for gait problem and neck pain. Skin: Negative for rash and wound. Past Medical History:   Diagnosis Date    Acquired deviated nasal septum     Anal bleeding 10/31/2016    Ankle injury     Asthma     Benign paroxysmal positional vertigo     Derangement of medial meniscus     L knee mild, clinical diagnosis 9/19/12 after turning foot and popping sound 9/16/12 while walking and turn.     Eustachian tube dysfunction     Excessive sleepiness     During the day    Generalized anxiety disorder     H/O varicose veins     History of sprain of foot     Osteoarthritis     Pain in joint of left knee     Palpitations     Psoriasis        Current Outpatient Medications   Medication Sig Dispense Refill    phentermine (ADIPEX-P) 37.5 MG tablet Take 37.5 mg by mouth every morning (before breakfast).  trimethoprim-polymyxin b (POLYTRIM) 41530-7.1 UNIT/ML-% ophthalmic solution Place 1 drop into the left eye every 4 hours for 7 days 1 Bottle 0    desvenlafaxine succinate (PRISTIQ) 100 MG TB24 extended release tablet TAKE 1 TABLET BY MOUTH ONCE DAILY. 90 tablet 0    busPIRone (BUSPAR) 15 MG tablet TAKE 1 TO 2 TABLETS BY MOUTH TWICE DAILY. 180 tablet 1    metFORMIN (GLUCOPHAGE-XR) 500 MG extended release tablet Take 2 tablets by mouth Daily with supper 60 tablet 5    naproxen (NAPROSYN) 500 MG tablet Take 1 tablet by mouth 2 times daily 20 tablet 0    albuterol sulfate HFA (VENTOLIN HFA) 108 (90 Base) MCG/ACT inhaler Inhale 2 puffs into the lungs every 6 hours as needed for Wheezing 1 Inhaler 3    Apremilast (OTEZLA) 30 MG TABS Take 30 mg by mouth 2 times daily       No current facility-administered medications for this visit.          No Known Allergies    Past Surgical History:   Procedure Laterality Date    APPENDECTOMY      BREAST SURGERY      lift and a left implant   Kathy Kahn    ENDOSCOPY, COLON, DIAGNOSTIC      KNEE ARTHROSCOPY Bilateral     [place] COLONOSCOPY FLX DX W/COLLJ SPEC WHEN PFRMD N/A 2016    Dr Terra Castillo    SINUS SURGERY      nasal septoplasty/ maxillary antrostomies    TONSILLECTOMY      Aries Albrecht, [address]       Social History     Tobacco Use    Smoking status: Former Smoker     Last attempt to quit:      Years since quittin.0    Smokeless tobacco: Never Used   Substance Use Topics    Alcohol use: Yes     Comment: occ    Drug use: No       Family History   Problem Relation Age of Onset    Colon Cancer Neg Hx     Colon Polyps Neg Hx     Esophageal Cancer Neg Hx     Liver Cancer Neg Hx     Liver Disease Neg Hx     Stomach Cancer Neg Hx     Rectal Cancer Neg Hx        /73   Pulse 86   Temp 97.6 °F (36.4 °C)   Resp 18   Ht 5' 8\" (1.727 m)   Wt 199 lb (90.3 kg)   SpO2 98%   BMI 30.26 kg/m²     Physical Exam  Vitals signs and nursing note reviewed. Constitutional:       Appearance: She is well-developed. HENT:      Head: Normocephalic and atraumatic. Right Ear: Hearing and external ear normal.      Left Ear: Hearing and external ear normal.      Nose: No congestion or rhinorrhea. Mouth/Throat:      Mouth: Mucous membranes are moist.      Pharynx: No oropharyngeal exudate or posterior oropharyngeal erythema. Eyes:      General: No scleral icterus. Right eye: No discharge. Left eye: Discharge present. Pupils: Pupils are equal, round, and reactive to light. Comments: Conjunctival erythema with minimal drainage appreciated. Neck:      Musculoskeletal: Normal range of motion and neck supple. Trachea: No tracheal deviation. Cardiovascular:      Rate and Rhythm: Normal rate and regular rhythm. Heart sounds: Normal heart sounds. No murmur. No friction rub. No gallop. Pulmonary:      Effort: Pulmonary effort is normal. No respiratory distress. Breath sounds: Normal breath sounds. No wheezing or rales. Abdominal:      General: Bowel sounds are normal. There is no distension. Palpations: Abdomen is soft. Tenderness: There is no tenderness. Musculoskeletal: Normal range of motion. General: No deformity ( no gross deformities of upper or lower extremities bilaterally). Right lower leg: No edema. Left lower leg: No edema. Lymphadenopathy:      Cervical: No cervical adenopathy. Skin:     General: Skin is warm and dry. Findings: No erythema or rash.    Neurological:      Mental Status: She is alert and oriented to person, place, and time. Cranial Nerves: No cranial nerve deficit. Motor: No abnormal muscle tone. Psychiatric:         Behavior: Behavior normal.         Thought Content: Thought content normal.         Assessment:       ICD-10-CM    1. Acute bacterial conjunctivitis of left eye H10.32 trimethoprim-polymyxin b (POLYTRIM) 64542-5.1 UNIT/ML-% ophthalmic solution       Plan:  Discussed diagnosis, expected course, and proper use of medication. Discussed proper care of involved and other symptomatic treatments that may be beneficial for her symptoms, including but not limited to compresses. Discussed signs and symptoms requiring medical attention. All questions were answered and patient voiced understanding and agreement with plan as discussed. No orders of the defined types were placed in this encounter. Orders Placed This Encounter   Medications    trimethoprim-polymyxin b (POLYTRIM) 22298-6.1 UNIT/ML-% ophthalmic solution     Sig: Place 1 drop into the left eye every 4 hours for 7 days     Dispense:  1 Bottle     Refill:  0     There are no discontinued medications. Patient Instructions   Patient given educational handouts and has had all questions answered. Patient voices understanding and agrees to plans along with risks and benefits of plan. Patient is instructed to continue prior meds,diet, and exercise plans as instructed. Patient agrees to follow up as instructed and sooner if needed. Patient agrees to go to ER if condition becomes emergent. Return if symptoms worsen or fail to improve, for next scheduled follow up with PCP.

## 2020-01-05 NOTE — PATIENT INSTRUCTIONS
Patient Education        Pinkeye: Care Instructions  Your Care Instructions    Pinkeye is redness and swelling of the eye surface and the conjunctiva (the lining of the eyelid and the covering of the white part of the eye). Pinkeye is also called conjunctivitis. Pinkeye is often caused by infection with bacteria or a virus. Dry air, allergies, smoke, and chemicals are other common causes. Pinkeye often clears on its own in 7 to 10 days. Antibiotics only help if the pinkeye is caused by bacteria. Pinkeye caused by infection spreads easily. If an allergy or chemical is causing pinkeye, it will not go away unless you can avoid whatever is causing it. Follow-up care is a key part of your treatment and safety. Be sure to make and go to all appointments, and call your doctor if you are having problems. It's also a good idea to know your test results and keep a list of the medicines you take. How can you care for yourself at home? · Wash your hands often. Always wash them before and after you treat pinkeye or touch your eyes or face. · Use moist cotton or a clean, wet cloth to remove crust. Wipe from the inside corner of the eye to the outside. Use a clean part of the cloth for each wipe. · Put cold or warm wet cloths on your eye a few times a day if the eye hurts. · Do not wear contact lenses or eye makeup until the pinkeye is gone. Throw away any eye makeup you were using when you got pinkeye. Clean your contacts and storage case. If you wear disposable contacts, use a new pair when your eye has cleared and it is safe to wear contacts again. · If the doctor gave you antibiotic ointment or eyedrops, use them as directed. Use the medicine for as long as instructed, even if your eye starts looking better soon. Keep the bottle tip clean, and do not let it touch the eye area. · To put in eyedrops or ointment:  ? Tilt your head back, and pull your lower eyelid down with one finger. ?  Drop or squirt the medicine inside the lower lid. ? Close your eye for 30 to 60 seconds to let the drops or ointment move around. ? Do not touch the ointment or dropper tip to your eyelashes or any other surface. · Do not share towels, pillows, or washcloths while you have pinkeye. When should you call for help? Call your doctor now or seek immediate medical care if:    · You have pain in your eye, not just irritation on the surface.     · You have a change in vision or loss of vision.     · You have an increase in discharge from the eye.     · Your eye has not started to improve or begins to get worse within 48 hours after you start using antibiotics.     · Pinkeye lasts longer than 7 days.    Watch closely for changes in your health, and be sure to contact your doctor if you have any problems. Where can you learn more? Go to [email or web address] and sign in to your [employer] account. Enter Y392 in the [employer] box to learn more about \"Pinkeye: Care Instructions. \"     If you do not have an account, please click on the \"Sign Up Now\" link. Current as of: September 23, 2018  Content Version: 12.1  © 5805-3866 [employer]. Care instructions adapted under license by [facility] ([facility]). If you have questions about a medical condition or this instruction, always ask your healthcare professional. [place] [address] any warranty or liability for your use of this information.

## 2020-01-17 ENCOUNTER — TELEPHONE (OUTPATIENT)
Dept: INTERNAL MEDICINE | Age: 50
End: 2020-01-17

## 2020-01-21 ENCOUNTER — TELEPHONE (OUTPATIENT)
Dept: INTERNAL MEDICINE | Age: 50
End: 2020-01-21

## 2020-01-21 RX ORDER — PHENTERMINE HYDROCHLORIDE 37.5 MG/1
37.5 TABLET ORAL
Qty: 30 TABLET | Refills: 0 | Status: SHIPPED | OUTPATIENT
Start: 2020-01-21 | End: 2020-02-26 | Stop reason: SDUPTHER

## 2020-01-21 NOTE — TELEPHONE ENCOUNTER
Rosalba called requesting a refill of the below medication which has been pended for you:     Requested Prescriptions     Pending Prescriptions Disp Refills    phentermine (ADIPEX-P) 37.5 MG tablet 30 tablet 0     Sig: Take 1 tablet by mouth every morning (before breakfast) for 30 days.        Last Appointment Date: 11/19/2019  Next Appointment Date: 5/19/2020    No Known Allergies

## 2020-01-21 NOTE — TELEPHONE ENCOUNTER
Pt called wanting a refill on the phentermine she came in on Friday and weighed in at 203.8  Last time she was in office in November she weighed 206

## 2020-02-20 RX ORDER — PHENTERMINE HYDROCHLORIDE 37.5 MG/1
TABLET ORAL
Qty: 30 TABLET | Refills: 0 | OUTPATIENT
Start: 2020-02-20

## 2020-02-26 ENCOUNTER — OFFICE VISIT (OUTPATIENT)
Dept: INTERNAL MEDICINE | Age: 50
End: 2020-02-26
Payer: COMMERCIAL

## 2020-02-26 VITALS
SYSTOLIC BLOOD PRESSURE: 120 MMHG | HEART RATE: 113 BPM | BODY MASS INDEX: 31.22 KG/M2 | HEIGHT: 68 IN | WEIGHT: 206 LBS | OXYGEN SATURATION: 97 % | DIASTOLIC BLOOD PRESSURE: 76 MMHG

## 2020-02-26 PROCEDURE — 99213 OFFICE O/P EST LOW 20 MIN: CPT | Performed by: INTERNAL MEDICINE

## 2020-02-26 RX ORDER — PHENTERMINE HYDROCHLORIDE 37.5 MG/1
37.5 TABLET ORAL
Qty: 30 TABLET | Refills: 1 | Status: SHIPPED | OUTPATIENT
Start: 2020-02-26 | End: 2020-03-27

## 2020-02-26 NOTE — PROGRESS NOTES
Chief Complaint   Patient presents with    Follow-up     weight      History of presenting illness:  Lew Celestin is a48 y.o. female who presents today for follow up on her chronic medical conditions as noted below. 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    Generalized anxiety disorder 10/22/2017    IFG (impaired fasting glucose) 10/22/2017    Exogenous obesity 10/22/2017    Psoriasis 10/22/2017    IBS (irritable bowel syndrome) 10/22/2017    History of bone density study      Overview Note:     4/2018 normal      Abnormal liver function 08/15/2017    Foot fracture, left 08/15/2017    Foot pain 08/15/2017    Altered bowel function 10/31/2016     Overview Note:     Updating Deprecated Diagnoses      Elevated blood pressure 10/15/2016    Essential (primary) hypertension 10/15/2016     Past Medical History:   Diagnosis Date    Acquired deviated nasal septum     Anal bleeding 10/31/2016    Ankle injury     Asthma     Benign paroxysmal positional vertigo     Derangement of medial meniscus     L knee mild, clinical diagnosis 9/19/12 after turning foot and popping sound 9/16/12 while walking and turn.     Eustachian tube dysfunction     Excessive sleepiness     During the day    Generalized anxiety disorder     H/O varicose veins     History of sprain of foot     Osteoarthritis     Pain in joint of left knee     Palpitations     Psoriasis       Past Surgical History:   Procedure Laterality Date    APPENDECTOMY      BREAST SURGERY      lift and a left implant   Sonal Boyle    ENDOSCOPY, COLON, DIAGNOSTIC      KNEE ARTHROSCOPY Bilateral     [place] COLONOSCOPY FLX DX W/COLLJ SPEC WHEN PFRMD N/A 2016    Dr Alecia Gorman    SINUS SURGERY      nasal septoplasty/ maxillary antrostomies   Marliss Bloch Dr. Lawrance Schmitz, [place]     Current Outpatient Medications   Medication Sig Dispense Refill    phentermine (ADIPEX-P) 37.5 MG tablet Take 1 tablet by mouth every morning (before breakfast) for 30 days. 30 tablet 1    desvenlafaxine succinate (PRISTIQ) 100 MG TB24 extended release tablet TAKE 1 TABLET BY MOUTH ONCE DAILY. 90 tablet 0    busPIRone (BUSPAR) 15 MG tablet TAKE 1 TO 2 TABLETS BY MOUTH TWICE DAILY. 180 tablet 1    metFORMIN (GLUCOPHAGE-XR) 500 MG extended release tablet Take 2 tablets by mouth Daily with supper 60 tablet 5    naproxen (NAPROSYN) 500 MG tablet Take 1 tablet by mouth 2 times daily 20 tablet 0    albuterol sulfate HFA (VENTOLIN HFA) 108 (90 Base) MCG/ACT inhaler Inhale 2 puffs into the lungs every 6 hours as needed for Wheezing 1 Inhaler 3    Apremilast (OTEZLA) 30 MG TABS Take 30 mg by mouth 2 times daily       No current facility-administered medications for this visit.       No Known Allergies  Social History     Tobacco Use    Smoking status: Former Smoker     Last attempt to quit:      Years since quittin.1    Smokeless tobacco: Never Used   Substance Use Topics    Alcohol use: Yes     Comment: occ      Family History   Problem Relation Age of Onset    Colon Cancer Neg Hx     Colon Polyps Neg Hx     Esophageal Cancer Neg Hx     Liver Cancer Neg Hx     Liver Disease Neg Hx     Stomach Cancer Neg Hx     Rectal Cancer Neg Hx        Review of Systems  Vitals:    20 0834   BP: 120/76   Pulse: 113   SpO2: 97%   Weight: 206 lb (93.4 kg) Height: 5' 8\" (1.727 m)     Body mass index is 31.32 kg/m². Physical Exam  Constitutional:       Appearance: She is well-developed. HENT:      Right Ear: External ear normal.      Left Ear: External ear normal.      Mouth/Throat:      Pharynx: No oropharyngeal exudate. Eyes:      Conjunctiva/sclera: Conjunctivae normal.      Pupils: Pupils are equal, round, and reactive to light. Neck:      Musculoskeletal: Neck supple. Thyroid: No thyromegaly. Vascular: No JVD. Cardiovascular:      Rate and Rhythm: Normal rate. Heart sounds: Normal heart sounds. No murmur. Pulmonary:      Effort: No respiratory distress. Breath sounds: Normal breath sounds. No wheezing or rales. Chest:      Chest wall: No tenderness. Abdominal:      General: Bowel sounds are normal.      Palpations: Abdomen is soft. Musculoskeletal: Normal range of motion. Lymphadenopathy:      Cervical: No cervical adenopathy. Skin:     General: Skin is warm. Findings: No rash. Neurological:      Mental Status: She is oriented to person, place, and time. Lab Review   No visits with results within 2 Month(s) from this visit. Latest known visit with results is:   Office Visit on 12/13/2019   Component Date Value    Strep A Ag 12/13/2019 None Detected            ASSESSMENT/PLAN:  Rosalba was seen today for follow-up. Diagnoses and all orders for this visit:    Essential (primary) hypertension  BP now has been in good range  Prescription needed  Monitor closely      Exogenous obesity  Diet and weight loss DW in length with patient. I have advised patient to follow strict lower carbohydrate dietary regimen and engage in  exercise routine for 30-45 minutes at least 3-4  Days per week. Patient was provided with strategies how to  shift from personal maladaptive eating patterns toward healthful eating and exercise. . Behavioral therapy components of self monitoring, goal setting, stimulus control and social

## 2020-03-05 RX ORDER — DESVENLAFAXINE 100 MG/1
TABLET, EXTENDED RELEASE ORAL
Qty: 90 TABLET | Refills: 1 | Status: SHIPPED | OUTPATIENT
Start: 2020-03-05 | End: 2020-08-31

## 2020-03-05 NOTE — TELEPHONE ENCOUNTER
Rosalba called requesting a refill of the below medication which has been pended for you:     Requested Prescriptions     Pending Prescriptions Disp Refills    desvenlafaxine succinate (PRISTIQ) 100 MG TB24 extended release tablet [Pharmacy Med Name: DESVENLAFAXINE 100MG E.R] 90 tablet 1     Sig: TAKE 1 TABLET BY MOUTH ONCE DAILY.        Last Appointment Date: 2/26/2020  Next Appointment Date: 5/19/2020    No Known Allergies

## 2020-03-16 ENCOUNTER — PATIENT MESSAGE (OUTPATIENT)
Dept: INTERNAL MEDICINE | Age: 50
End: 2020-03-16

## 2020-03-18 ENCOUNTER — PATIENT MESSAGE (OUTPATIENT)
Dept: INTERNAL MEDICINE | Age: 50
End: 2020-03-18

## 2020-03-18 ENCOUNTER — E-VISIT (OUTPATIENT)
Dept: INTERNAL MEDICINE | Age: 50
End: 2020-03-18
Payer: COMMERCIAL

## 2020-03-18 PROCEDURE — 99421 OL DIG E/M SVC 5-10 MIN: CPT | Performed by: INTERNAL MEDICINE

## 2020-03-18 RX ORDER — LEVOFLOXACIN 500 MG/1
500 TABLET, FILM COATED ORAL DAILY
Qty: 7 TABLET | Refills: 0 | Status: SHIPPED | OUTPATIENT
Start: 2020-03-18 | End: 2020-03-25

## 2020-03-18 RX ORDER — ALBUTEROL SULFATE 90 UG/1
2 AEROSOL, METERED RESPIRATORY (INHALATION) EVERY 6 HOURS PRN
Qty: 1 INHALER | Refills: 3 | Status: SHIPPED | OUTPATIENT
Start: 2020-03-18

## 2020-03-18 ASSESSMENT — LIFESTYLE VARIABLES
SMOKING_YEARS: 10
PACKS_PER_DAY: 1.5
SMOKING_STATUS: NO, I'M A FORMER SMOKER

## 2020-03-18 NOTE — TELEPHONE ENCOUNTER
From: Danielle Garnett  To: Mazin Sargent MD  Sent: 3/18/2020 3:20 PM CDT  Subject: Non-Urgent Medical Question    I called and left a message with the nurse have not heard back. ----- Message -----   From:LIBRA RANGEL   Sent:3/18/2020 2:55 PM CDT   To:Rosalba Dorantes   Subject:RE: Non-Urgent Medical Question    Were you able to do a E-visit?      ----- Message -----   From:Rosalba Dorantes   Sent:3/16/2020 9:34 AM CDT   To:Sofia Bender MD   Subject:Non-Urgent Medical Question    I believe I have a sinus infection. Could i have something called in for this?   Rosalba

## 2020-03-18 NOTE — PROGRESS NOTES
Patient presenting with about 3-day history of sinus pressure/sinus drainage  She has history of asthma and has been coughing more, some yellow sputum production  Has been having some wheezing  Denies any shortness of breath  Denies any fever or chills  At this time recommend following  1. We will send in antibiotic Levaquin 500 mg daily x7 days  2. As patient to use Mucinex 600 twice daily  3. Use Flonase no spray 2 sprays each side once daily  4. Uses Zyrtec 10 mg daily  5.   Use your inhaler, inhaler refill will be sent in albuterol 2 puffs every 6 hours  Do not recommend any oral steroids at this time;  since there is outbreak of COVID19 at this time steroid use is not recommended since it can lower your immunity in case you were to contact this virus  If sx not improving and resolving , if sx continue or re-occur pt has been instructed to call us and / or return here for follow- up evaluation

## 2020-03-25 PROBLEM — E66.09 EXOGENOUS OBESITY: Status: RESOLVED | Noted: 2017-10-22 | Resolved: 2020-03-24

## 2020-04-14 RX ORDER — BUSPIRONE HYDROCHLORIDE 15 MG/1
TABLET ORAL
Qty: 180 TABLET | Refills: 0 | Status: SHIPPED | OUTPATIENT
Start: 2020-04-14 | End: 2020-07-14

## 2020-04-29 ENCOUNTER — TELEMEDICINE (OUTPATIENT)
Dept: INTERNAL MEDICINE | Age: 50
End: 2020-04-29
Payer: COMMERCIAL

## 2020-04-29 PROCEDURE — 99213 OFFICE O/P EST LOW 20 MIN: CPT | Performed by: INTERNAL MEDICINE

## 2020-04-29 RX ORDER — PHENTERMINE HYDROCHLORIDE 37.5 MG/1
37.5 TABLET ORAL
Qty: 30 TABLET | Refills: 1 | Status: SHIPPED | OUTPATIENT
Start: 2020-04-29 | End: 2020-11-24 | Stop reason: SDUPTHER

## 2020-04-29 ASSESSMENT — ENCOUNTER SYMPTOMS
ABDOMINAL PAIN: 0
SORE THROAT: 0
CONSTIPATION: 0
COUGH: 0
WHEEZING: 0
CHEST TIGHTNESS: 0

## 2020-04-29 ASSESSMENT — PATIENT HEALTH QUESTIONNAIRE - PHQ9
SUM OF ALL RESPONSES TO PHQ9 QUESTIONS 1 & 2: 0
SUM OF ALL RESPONSES TO PHQ QUESTIONS 1-9: 0
SUM OF ALL RESPONSES TO PHQ QUESTIONS 1-9: 0
1. LITTLE INTEREST OR PLEASURE IN DOING THINGS: 0
2. FEELING DOWN, DEPRESSED OR HOPELESS: 0

## 2020-04-29 NOTE — PROGRESS NOTES
day    Generalized anxiety disorder     H/O varicose veins     History of sprain of foot     Osteoarthritis     Pain in joint of left knee     Palpitations     Psoriasis       Past Surgical History:   Procedure Laterality Date    APPENDECTOMY      BREAST SURGERY      lift and a left implant   Natalie Peer    Dr. Maite Melendez    ENDOSCOPY, COLON, DIAGNOSTIC      KNEE ARTHROSCOPY Bilateral     [place] COLONOSCOPY FLX DX W/COLLJ SPEC WHEN PFRMD N/A 2016    Dr Alberto Rueda    SINUS SURGERY      nasal septoplasty/ maxillary antrostomies   Sabitomeka Torres, [place]     Current Outpatient Medications   Medication Sig Dispense Refill    phentermine (ADIPEX-P) 37.5 MG tablet Take 1 tablet by mouth every morning (before breakfast) for 30 days. 30 tablet 1    busPIRone (BUSPAR) 15 MG tablet TAKE 1 TO 2 TABLETS BY MOUTH TWICE DAILY. 180 tablet 0    albuterol sulfate HFA (VENTOLIN HFA) 108 (90 Base) MCG/ACT inhaler Inhale 2 puffs into the lungs every 6 hours as needed for Wheezing 1 Inhaler 3    desvenlafaxine succinate (PRISTIQ) 100 MG TB24 extended release tablet TAKE 1 TABLET BY MOUTH ONCE DAILY. 90 tablet 1    metFORMIN (GLUCOPHAGE-XR) 500 MG extended release tablet Take 2 tablets by mouth Daily with supper 60 tablet 5    naproxen (NAPROSYN) 500 MG tablet Take 1 tablet by mouth 2 times daily 20 tablet 0    Apremilast (OTEZLA) 30 MG TABS Take 30 mg by mouth 2 times daily       No current facility-administered medications for this visit.       No Known Allergies  Social History     Tobacco Use    Smoking status: Former Smoker     Last attempt to quit:      Years since quittin.3    Smokeless tobacco: Never Used   Substance Use Topics    Alcohol use: Yes     Comment: occ      Family History   Problem Relation Age of Onset    Colon Cancer Neg Hx     Colon Polyps Neg Hx     Esophageal Cancer Neg Hx     and weight loss DW in length with patient. I have advised patient to follow strict lower carbohydrate dietary regimen and engage in  exercise routine for 30-45 minutes at least 3-4  Days per week. Patient was provided with strategies how to  shift from personal maladaptive eating patterns toward healthful eating and exercise. . Behavioral therapy components of self monitoring, goal setting, stimulus control and social support were emphasized. Along with above, I am prescribing this patient  Phentermin (  Her BMI is above 30)  While staying on this short term Phentermin treatment regimen, the goal is to loose at least 1 lbs  of weight per week. 2 month follow up appointment is recommended. -     phentermine (ADIPEX-P) 37.5 MG tablet; Take 1 tablet by mouth every morning (before breakfast) for 30 days. Essential (primary) hypertension  bp has been good per pt  She will send me readings tomorrow                No orders of the defined types were placed in this encounter. New Prescriptions    PHENTERMINE (ADIPEX-P) 37.5 MG TABLET    Take 1 tablet by mouth every morning (before breakfast) for 30 days. No follow-ups on file. There are no Patient Instructions on file for this visit. EMR Dragon/transcription disclaimer:Significant part of this  encounter note is electronic transcription/translationof spoken language to printed text. The electronic translation of spoken language may be erroneous, or at times, nonsensical words or phrases may be inadvertently transcribed.  Although I have reviewed the note for sucherrors, some may still exist.

## 2020-04-30 VITALS
WEIGHT: 199 LBS | SYSTOLIC BLOOD PRESSURE: 122 MMHG | DIASTOLIC BLOOD PRESSURE: 77 MMHG | BODY MASS INDEX: 30.26 KG/M2 | HEART RATE: 105 BPM

## 2020-05-08 RX ORDER — METFORMIN HYDROCHLORIDE 500 MG/1
1000 TABLET, EXTENDED RELEASE ORAL
Qty: 60 TABLET | Refills: 0 | Status: SHIPPED | OUTPATIENT
Start: 2020-05-08 | End: 2020-06-08

## 2020-05-19 ENCOUNTER — OFFICE VISIT (OUTPATIENT)
Dept: INTERNAL MEDICINE | Age: 50
End: 2020-05-19
Payer: COMMERCIAL

## 2020-05-19 VITALS
WEIGHT: 204 LBS | SYSTOLIC BLOOD PRESSURE: 122 MMHG | OXYGEN SATURATION: 98 % | BODY MASS INDEX: 30.92 KG/M2 | HEART RATE: 88 BPM | DIASTOLIC BLOOD PRESSURE: 78 MMHG | HEIGHT: 68 IN

## 2020-05-19 DIAGNOSIS — E66.09 EXOGENOUS OBESITY: ICD-10-CM

## 2020-05-19 DIAGNOSIS — I10 ESSENTIAL (PRIMARY) HYPERTENSION: ICD-10-CM

## 2020-05-19 DIAGNOSIS — E78.2 MIXED HYPERLIPIDEMIA: ICD-10-CM

## 2020-05-19 DIAGNOSIS — F41.1 GENERALIZED ANXIETY DISORDER: ICD-10-CM

## 2020-05-19 DIAGNOSIS — R73.01 IFG (IMPAIRED FASTING GLUCOSE): ICD-10-CM

## 2020-05-19 LAB
ALBUMIN SERPL-MCNC: 4 G/DL (ref 3.5–5.2)
ALP BLD-CCNC: 78 U/L (ref 35–104)
ALT SERPL-CCNC: 28 U/L (ref 5–33)
ANION GAP SERPL CALCULATED.3IONS-SCNC: 11 MMOL/L (ref 7–19)
AST SERPL-CCNC: 22 U/L (ref 5–32)
BACTERIA: NEGATIVE /HPF
BASOPHILS ABSOLUTE: 0.1 K/UL (ref 0–0.2)
BASOPHILS RELATIVE PERCENT: 0.5 % (ref 0–1)
BILIRUB SERPL-MCNC: <0.2 MG/DL (ref 0.2–1.2)
BILIRUBIN URINE: NEGATIVE
BLOOD, URINE: NEGATIVE
BUN BLDV-MCNC: 16 MG/DL (ref 6–20)
CALCIUM SERPL-MCNC: 9.1 MG/DL (ref 8.6–10)
CHLORIDE BLD-SCNC: 102 MMOL/L (ref 98–111)
CLARITY: CLEAR
CO2: 27 MMOL/L (ref 22–29)
COLOR: YELLOW
CREAT SERPL-MCNC: 0.7 MG/DL (ref 0.5–0.9)
EOSINOPHILS ABSOLUTE: 0.1 K/UL (ref 0–0.6)
EOSINOPHILS RELATIVE PERCENT: 1.2 % (ref 0–5)
EPITHELIAL CELLS, UA: 2 /HPF (ref 0–5)
GFR NON-AFRICAN AMERICAN: >60
GLUCOSE BLD-MCNC: 113 MG/DL (ref 74–109)
GLUCOSE URINE: NEGATIVE MG/DL
HBA1C MFR BLD: 5.7 % (ref 4–6)
HCT VFR BLD CALC: 42.2 % (ref 37–47)
HEMOGLOBIN: 13.1 G/DL (ref 12–16)
HYALINE CASTS: 2 /HPF (ref 0–8)
IMMATURE GRANULOCYTES #: 0 K/UL
KETONES, URINE: NEGATIVE MG/DL
LEUKOCYTE ESTERASE, URINE: ABNORMAL
LYMPHOCYTES ABSOLUTE: 3.1 K/UL (ref 1.1–4.5)
LYMPHOCYTES RELATIVE PERCENT: 28.2 % (ref 20–40)
MCH RBC QN AUTO: 29.4 PG (ref 27–31)
MCHC RBC AUTO-ENTMCNC: 31 G/DL (ref 33–37)
MCV RBC AUTO: 94.6 FL (ref 81–99)
MONOCYTES ABSOLUTE: 1.3 K/UL (ref 0–0.9)
MONOCYTES RELATIVE PERCENT: 11.6 % (ref 0–10)
NEUTROPHILS ABSOLUTE: 6.4 K/UL (ref 1.5–7.5)
NEUTROPHILS RELATIVE PERCENT: 58.2 % (ref 50–65)
NITRITE, URINE: NEGATIVE
PDW BLD-RTO: 13.8 % (ref 11.5–14.5)
PH UA: 6 (ref 5–8)
PLATELET # BLD: 315 K/UL (ref 130–400)
PMV BLD AUTO: 11 FL (ref 9.4–12.3)
POTASSIUM SERPL-SCNC: 3.5 MMOL/L (ref 3.5–5)
PROTEIN UA: NEGATIVE MG/DL
RBC # BLD: 4.46 M/UL (ref 4.2–5.4)
RBC UA: 5 /HPF (ref 0–4)
SODIUM BLD-SCNC: 140 MMOL/L (ref 136–145)
SPECIFIC GRAVITY UA: 1.02 (ref 1–1.03)
TOTAL PROTEIN: 7 G/DL (ref 6.6–8.7)
TSH SERPL DL<=0.05 MIU/L-ACNC: 1.17 UIU/ML (ref 0.27–4.2)
UROBILINOGEN, URINE: 1 E.U./DL
VITAMIN D 25-HYDROXY: 52.8 NG/ML
WBC # BLD: 11 K/UL (ref 4.8–10.8)
WBC UA: 2 /HPF (ref 0–5)

## 2020-05-19 PROCEDURE — 99396 PREV VISIT EST AGE 40-64: CPT | Performed by: INTERNAL MEDICINE

## 2020-05-19 ASSESSMENT — ENCOUNTER SYMPTOMS
COUGH: 0
CHEST TIGHTNESS: 0
BLOOD IN STOOL: 0
SORE THROAT: 0
COLOR CHANGE: 0
WHEEZING: 0
DIARRHEA: 0
EYE PAIN: 0
SINUS PRESSURE: 0
EYE REDNESS: 0
NAUSEA: 0
VOICE CHANGE: 0
TROUBLE SWALLOWING: 0
VOMITING: 0
CONSTIPATION: 0
ABDOMINAL PAIN: 0

## 2020-05-19 NOTE — PROGRESS NOTES
change. Eyes: Negative for pain, redness and visual disturbance. Respiratory: Negative for cough, chest tightness and wheezing. Cardiovascular: Negative for chest pain, palpitations and leg swelling. Gastrointestinal: Negative for abdominal pain, blood in stool, constipation, diarrhea, nausea and vomiting. Endocrine: Negative for polydipsia and polyuria. Genitourinary: Negative for dysuria, enuresis, flank pain, frequency and urgency. Musculoskeletal: Negative for arthralgias, gait problem and joint swelling. Skin: Negative for color change and rash. Neurological: Negative for dizziness, tremors, syncope, facial asymmetry, speech difficulty, weakness, numbness and headaches. Psychiatric/Behavioral: Negative for agitation, behavioral problems, confusion, sleep disturbance and suicidal ideas. The patient is not nervous/anxious. Vitals:    05/19/20 1622   BP: 122/78   Pulse: 88   SpO2: 98%   Weight: 204 lb (92.5 kg)   Height: 5' 8\" (1.727 m)      Wt Readings from Last 3 Encounters:   05/19/20 204 lb (92.5 kg)   04/29/20 199 lb (90.3 kg)   02/26/20 206 lb (93.4 kg)   Body mass index is 31.02 kg/m². BP Readings from Last 3 Encounters:   05/19/20 122/78   04/29/20 122/77   02/26/20 120/76       Physical Exam  Constitutional:       General: She is not in acute distress. Appearance: She is well-developed. She is not diaphoretic. HENT:      Head: Normocephalic and atraumatic. Nose: Nose normal.   Eyes:      General: No scleral icterus. Right eye: No discharge. Left eye: No discharge. Conjunctiva/sclera: Conjunctivae normal.      Pupils: Pupils are equal, round, and reactive to light. Neck:      Musculoskeletal: Normal range of motion. Thyroid: No thyromegaly. Vascular: No JVD. Cardiovascular:      Rate and Rhythm: Normal rate and regular rhythm. Heart sounds: No murmur. Pulmonary:      Breath sounds: Normal breath sounds.  No wheezing or components of self monitoring, goal setting, stimulus control and social support were emphasized. Along with above, I am prescribing this patient  Phentermin ( Nuria Pierre BMI is above 30)  While staying on this short term Phentermin treatment regimen, the goal is to loose at least 1 lbs  of weight per week. 2 month follow up appointment is recommended.      Vit D level good range 52  Cont current rx    Orders Placed This Encounter   Procedures    ALPESH DIGITAL SCREEN W CAD BILATERAL    Comprehensive Metabolic Panel    Hemoglobin A1C    Lipid Panel    Vitamin D 25 Hydroxy    CBC Auto Differential     New Prescriptions    No medications on file      There are no Patient Instructions on file for this visit. Return in about 6 months (around 11/19/2020) for Medication check. EMR Dragon/transcription disclaimer:Significant part of this  encounter note is electronic transcription/translation of spoken language to printed text. The electronic translation of spoken language may beerroneous, or at times, nonsensical words or phrases may be inadvertently transcribed.  Although I have reviewed the note for such errors, some may still exist.

## 2020-05-20 ENCOUNTER — TRANSCRIBE ORDERS (OUTPATIENT)
Dept: ADMINISTRATIVE | Facility: HOSPITAL | Age: 50
End: 2020-05-20

## 2020-05-20 DIAGNOSIS — Z12.31 ENCOUNTER FOR SCREENING MAMMOGRAM FOR MALIGNANT NEOPLASM OF BREAST: Primary | ICD-10-CM

## 2020-06-17 ENCOUNTER — APPOINTMENT (OUTPATIENT)
Dept: MAMMOGRAPHY | Facility: HOSPITAL | Age: 50
End: 2020-06-17

## 2020-06-24 ENCOUNTER — HOSPITAL ENCOUNTER (OUTPATIENT)
Dept: MAMMOGRAPHY | Facility: HOSPITAL | Age: 50
Discharge: HOME | End: 2020-06-24
Admitting: INTERNAL MEDICINE

## 2020-06-24 ENCOUNTER — APPOINTMENT (OUTPATIENT)
Dept: MAMMOGRAPHY | Facility: HOSPITAL | Age: 50
End: 2020-06-24

## 2020-06-24 DIAGNOSIS — Z12.31 ENCOUNTER FOR SCREENING MAMMOGRAM FOR MALIGNANT NEOPLASM OF BREAST: ICD-10-CM

## 2020-06-24 PROCEDURE — 77063 BREAST TOMOSYNTHESIS BI: CPT

## 2020-06-24 PROCEDURE — 77067 SCR MAMMO BI INCL CAD: CPT

## 2020-07-14 RX ORDER — BUSPIRONE HYDROCHLORIDE 15 MG/1
TABLET ORAL
Qty: 180 TABLET | Refills: 0 | Status: SHIPPED | OUTPATIENT
Start: 2020-07-14 | End: 2020-09-15

## 2020-07-14 NOTE — TELEPHONE ENCOUNTER
Rosalba called requesting a refill of the below medication which has been pended for you:     Requested Prescriptions     Pending Prescriptions Disp Refills    busPIRone (BUSPAR) 15 MG tablet [Pharmacy Med Name: BUSPIRONE  15 MG TAB *DD] 180 tablet 0     Sig: TAKE 1 TO 2 TABLETS BY MOUTH TWICE DAILY.        Last Appointment Date: 5/19/2020  Next Appointment Date: 11/24/2020    No Known Allergies

## 2020-08-31 RX ORDER — DESVENLAFAXINE 100 MG/1
TABLET, EXTENDED RELEASE ORAL
Qty: 90 TABLET | Refills: 0 | Status: SHIPPED | OUTPATIENT
Start: 2020-08-31 | End: 2020-12-01

## 2020-08-31 NOTE — TELEPHONE ENCOUNTER
Rosalba called requesting a refill of the below medication which has been pended for you:     Requested Prescriptions     Pending Prescriptions Disp Refills    desvenlafaxine succinate (PRISTIQ) 100 MG TB24 extended release tablet [Pharmacy Med Name: DESVENLAFAXINE 100MG TAB *DD] 90 tablet 0     Sig: TAKE 1 TABLET BY MOUTH ONCE DAILY.        Last Appointment Date: 5/19/2020  Next Appointment Date: 11/24/2020    No Known Allergies

## 2020-09-15 RX ORDER — BUSPIRONE HYDROCHLORIDE 15 MG/1
TABLET ORAL
Qty: 120 TABLET | Refills: 0 | Status: SHIPPED | OUTPATIENT
Start: 2020-09-15 | End: 2020-11-10

## 2020-09-15 NOTE — TELEPHONE ENCOUNTER
Rosalba called requesting a refill of the below medication which has been pended for you:     Requested Prescriptions     Pending Prescriptions Disp Refills    busPIRone (BUSPAR) 15 MG tablet [Pharmacy Med Name: BUSPIRONE  15 MG TAB *DD] 120 tablet 0     Sig: TAKE 1 TO 2 TABLETS BY MOUTH TWICE DAILY.        Last Appointment Date: 5/19/2020  Next Appointment Date: 11/24/2020    No Known Allergies

## 2020-11-10 RX ORDER — BUSPIRONE HYDROCHLORIDE 15 MG/1
TABLET ORAL
Qty: 120 TABLET | Refills: 0 | Status: SHIPPED | OUTPATIENT
Start: 2020-11-10 | End: 2021-01-14

## 2020-11-10 NOTE — TELEPHONE ENCOUNTER
Rosalba called requesting a refill of the below medication which has been pended for you:     Requested Prescriptions     Pending Prescriptions Disp Refills    busPIRone (BUSPAR) 15 MG tablet [Pharmacy Med Name: BUSPIRONE  15 MG TAB] 120 tablet 0     Sig: TAKE 1 TO 2 TABLETS BY MOUTH TWICE DAILY.        Last Appointment Date: 5/19/2020  Next Appointment Date: 11/24/2020    No Known Allergies

## 2020-11-18 DIAGNOSIS — R73.01 IFG (IMPAIRED FASTING GLUCOSE): ICD-10-CM

## 2020-11-18 DIAGNOSIS — Z12.31 VISIT FOR SCREENING MAMMOGRAM: ICD-10-CM

## 2020-11-18 DIAGNOSIS — I10 ESSENTIAL (PRIMARY) HYPERTENSION: ICD-10-CM

## 2020-11-18 DIAGNOSIS — Z00.00 ANNUAL PHYSICAL EXAM: ICD-10-CM

## 2020-11-18 DIAGNOSIS — E78.2 MIXED HYPERLIPIDEMIA: ICD-10-CM

## 2020-11-18 DIAGNOSIS — F41.1 GENERALIZED ANXIETY DISORDER: ICD-10-CM

## 2020-11-18 LAB
ALBUMIN SERPL-MCNC: 4.2 G/DL (ref 3.5–5.2)
ALP BLD-CCNC: 87 U/L (ref 35–104)
ALT SERPL-CCNC: 26 U/L (ref 5–33)
ANION GAP SERPL CALCULATED.3IONS-SCNC: 10 MMOL/L (ref 7–19)
AST SERPL-CCNC: 24 U/L (ref 5–32)
BASOPHILS ABSOLUTE: 0.1 K/UL (ref 0–0.2)
BASOPHILS RELATIVE PERCENT: 0.7 % (ref 0–1)
BILIRUB SERPL-MCNC: 0.3 MG/DL (ref 0.2–1.2)
BUN BLDV-MCNC: 15 MG/DL (ref 6–20)
CALCIUM SERPL-MCNC: 9.4 MG/DL (ref 8.6–10)
CHLORIDE BLD-SCNC: 105 MMOL/L (ref 98–111)
CHOLESTEROL, TOTAL: 188 MG/DL (ref 160–199)
CO2: 26 MMOL/L (ref 22–29)
CREAT SERPL-MCNC: 0.5 MG/DL (ref 0.5–0.9)
EOSINOPHILS ABSOLUTE: 0.2 K/UL (ref 0–0.6)
EOSINOPHILS RELATIVE PERCENT: 2.2 % (ref 0–5)
GFR AFRICAN AMERICAN: >59
GFR NON-AFRICAN AMERICAN: >60
GLUCOSE BLD-MCNC: 95 MG/DL (ref 74–109)
HBA1C MFR BLD: 5.6 % (ref 4–6)
HBV SURFACE AB TITR SER: NORMAL {TITER}
HCT VFR BLD CALC: 44 % (ref 37–47)
HDLC SERPL-MCNC: 63 MG/DL (ref 65–121)
HEMOGLOBIN: 14.2 G/DL (ref 12–16)
HEPATITIS C ANTIBODY INTERPRETATION: NORMAL
HIV-1 P24 AG: NORMAL
IMMATURE GRANULOCYTES #: 0 K/UL
LDL CHOLESTEROL CALCULATED: 100 MG/DL
LYMPHOCYTES ABSOLUTE: 2.1 K/UL (ref 1.1–4.5)
LYMPHOCYTES RELATIVE PERCENT: 28.5 % (ref 20–40)
MCH RBC QN AUTO: 29.7 PG (ref 27–31)
MCHC RBC AUTO-ENTMCNC: 32.3 G/DL (ref 33–37)
MCV RBC AUTO: 92.1 FL (ref 81–99)
MONOCYTES ABSOLUTE: 0.8 K/UL (ref 0–0.9)
MONOCYTES RELATIVE PERCENT: 10.3 % (ref 0–10)
NEUTROPHILS ABSOLUTE: 4.3 K/UL (ref 1.5–7.5)
NEUTROPHILS RELATIVE PERCENT: 57.9 % (ref 50–65)
PDW BLD-RTO: 13.6 % (ref 11.5–14.5)
PLATELET # BLD: 292 K/UL (ref 130–400)
PMV BLD AUTO: 11.9 FL (ref 9.4–12.3)
POTASSIUM SERPL-SCNC: 4.3 MMOL/L (ref 3.5–5)
RAPID HIV 1&2: NORMAL
RBC # BLD: 4.78 M/UL (ref 4.2–5.4)
SODIUM BLD-SCNC: 141 MMOL/L (ref 136–145)
TOTAL PROTEIN: 7.3 G/DL (ref 6.6–8.7)
TRIGL SERPL-MCNC: 125 MG/DL (ref 0–149)
VITAMIN D 25-HYDROXY: 40.6 NG/ML
WBC # BLD: 7.4 K/UL (ref 4.8–10.8)

## 2020-11-20 LAB
QUANTI TB GOLD PLUS: NEGATIVE
QUANTI TB1 MINUS NIL: 0.01 IU/ML (ref 0–0.34)
QUANTI TB2 MINUS NIL: 0 IU/ML (ref 0–0.34)
QUANTIFERON MITOGEN: 8.08 IU/ML
QUANTIFERON NIL: 0.02 IU/ML

## 2020-11-24 ENCOUNTER — OFFICE VISIT (OUTPATIENT)
Dept: INTERNAL MEDICINE | Age: 50
End: 2020-11-24
Payer: COMMERCIAL

## 2020-11-24 VITALS
SYSTOLIC BLOOD PRESSURE: 130 MMHG | RESPIRATION RATE: 18 BRPM | HEIGHT: 68 IN | HEART RATE: 76 BPM | OXYGEN SATURATION: 100 % | BODY MASS INDEX: 32.58 KG/M2 | WEIGHT: 215 LBS | DIASTOLIC BLOOD PRESSURE: 82 MMHG

## 2020-11-24 PROCEDURE — 99214 OFFICE O/P EST MOD 30 MIN: CPT | Performed by: INTERNAL MEDICINE

## 2020-11-24 RX ORDER — PHENTERMINE HYDROCHLORIDE 37.5 MG/1
37.5 TABLET ORAL
Qty: 30 TABLET | Refills: 1 | Status: SHIPPED | OUTPATIENT
Start: 2020-11-24 | End: 2021-05-18 | Stop reason: SDUPTHER

## 2020-11-24 ASSESSMENT — ENCOUNTER SYMPTOMS
SORE THROAT: 0
WHEEZING: 0
ABDOMINAL PAIN: 0
CHEST TIGHTNESS: 0
COUGH: 0
CONSTIPATION: 0

## 2020-11-24 NOTE — PROGRESS NOTES
Chief Complaint   Patient presents with    6 Month Follow-Up     History of presenting illness:  Terrence Fitzpatrick is a53 y.o. female who presents today for follow up on her chronic medical conditions as noted below. 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    Generalized anxiety disorder 10/22/2017    IFG (impaired fasting glucose) 10/22/2017    Psoriasis 10/22/2017    IBS (irritable bowel syndrome) 10/22/2017    History of bone density study      Overview Note:     4/2018 normal      Abnormal liver function 08/15/2017    Foot fracture, left 08/15/2017    Foot pain 08/15/2017    Altered bowel function 10/31/2016     Overview Note:     Updating Deprecated Diagnoses      Elevated blood pressure 10/15/2016    Essential (primary) hypertension 10/15/2016     Past Medical History:   Diagnosis Date    Acquired deviated nasal septum     Anal bleeding 10/31/2016    Ankle injury     Asthma     Benign paroxysmal positional vertigo     Derangement of medial meniscus     L knee mild, clinical diagnosis 9/19/12 after turning foot and popping sound 9/16/12 while walking and turn.     Eustachian tube dysfunction     Excessive sleepiness     During the day    Generalized anxiety disorder     H/O varicose veins     History of sprain of foot     Osteoarthritis     Pain in joint of left knee     Palpitations     Psoriasis       Past Surgical History:   Procedure Laterality Date    APPENDECTOMY      BREAST SURGERY      lift and a left implant   Lety Carmona    ENDOSCOPY, COLON, DIAGNOSTIC      KNEE ARTHROSCOPY Bilateral     [place] COLONOSCOPY FLX DX W/COLLJ SPEC WHEN PFRMD N/A 2016    Dr Jr Henderson    SINUS SURGERY      nasal septoplasty/ maxillary antrostomies   [place] Stabs Dr. Arlan Ace, [place]     Current Outpatient Medications   Medication Sig Dispense Refill    phentermine (ADIPEX-P) 37.5 MG tablet Take 1 tablet by mouth every morning (before breakfast) for 30 days. 30 tablet 1    busPIRone (BUSPAR) 15 MG tablet TAKE 1 TO 2 TABLETS BY MOUTH TWICE DAILY. 120 tablet 0    desvenlafaxine succinate (PRISTIQ) 100 MG TB24 extended release tablet TAKE 1 TABLET BY MOUTH ONCE DAILY. 90 tablet 0    metFORMIN (GLUCOPHAGE-XR) 500 MG extended release tablet TAKE 2 TABLETS BY MOUTH DAILY WITH SUPPER 60 tablet 3    albuterol sulfate HFA (VENTOLIN HFA) 108 (90 Base) MCG/ACT inhaler Inhale 2 puffs into the lungs every 6 hours as needed for Wheezing 1 Inhaler 3    naproxen (NAPROSYN) 500 MG tablet Take 1 tablet by mouth 2 times daily 20 tablet 0    Apremilast (OTEZLA) 30 MG TABS Take 30 mg by mouth 2 times daily       No current facility-administered medications for this visit.       No Known Allergies  Social History     Tobacco Use    Smoking status: Former Smoker     Last attempt to quit:      Years since quittin.9    Smokeless tobacco: Never Used   Substance Use Topics    Alcohol use: Yes     Comment: occ      Family History   Problem Relation Age of Onset    Colon Cancer Neg Hx     Colon Polyps Neg Hx     Esophageal Cancer Neg Hx     Liver Cancer Neg Hx     Liver Disease Neg Hx     Stomach Cancer Neg Hx     Rectal Cancer Neg Hx        Review of Systems   Constitutional: Negative for chills, fatigue and fever. HENT: Negative for congestion, ear pain, nosebleeds, postnasal drip and sore throat. Respiratory: Negative for cough, chest tightness and wheezing. Cardiovascular: Negative for chest pain, palpitations and leg swelling. Gastrointestinal: Negative for abdominal pain and constipation. Genitourinary: Negative for dysuria and urgency. Musculoskeletal: Negative. Negative for arthralgias. Left knee pain     Skin: Negative for rash. Neurological: Negative for dizziness and headaches. Psychiatric/Behavioral: Negative. Vitals:    11/24/20 1648   BP: 130/82   Site: Left Upper Arm   Position: Sitting   Cuff Size: Large Adult   Pulse: 76   Resp: 18   SpO2: 100%   Weight: 215 lb (97.5 kg)   Height: 5' 8\" (1.727 m)     Body mass index is 32.69 kg/m². Physical Exam  Constitutional:       Appearance: She is well-developed. HENT:      Right Ear: External ear normal.      Left Ear: External ear normal.      Mouth/Throat:      Pharynx: No oropharyngeal exudate. Eyes:      Conjunctiva/sclera: Conjunctivae normal.      Pupils: Pupils are equal, round, and reactive to light. Neck:      Musculoskeletal: Neck supple. Thyroid: No thyromegaly. Vascular: No JVD. Cardiovascular:      Rate and Rhythm: Normal rate. Heart sounds: Normal heart sounds. No murmur. Pulmonary:      Effort: No respiratory distress. Breath sounds: Normal breath sounds. No wheezing or rales. Chest:      Chest wall: No tenderness. Abdominal:      General: Bowel sounds are normal.      Palpations: Abdomen is soft. Lymphadenopathy:      Cervical: No cervical adenopathy. Skin:     General: Skin is warm. Findings: No rash. Neurological:      Mental Status: She is oriented to person, place, and time.          Lab Review   Orders Only on 11/18/2020   Component Date Value  Quantiferon TB Minus NIL 11/18/2020 Negative     Quantiferon TB1 Minus NIL 11/18/2020 0.01     Quantiferon TB2 Minus NIL 11/18/2020 0.00     QuantiFERON Mitogen 11/18/2020 8.08     QuantiFERON Nil 11/18/2020 0.02    Orders Only on 11/18/2020   Component Date Value    Rapid HIV 1&2 11/18/2020 Non-reactive     HIV-1 P24 Ag 11/18/2020 Non-reactive    Orders Only on 11/18/2020   Component Date Value    Hep C Ab Interp 11/18/2020 Non-Reactive    Orders Only on 11/18/2020   Component Date Value    Hep B S Ab 11/18/2020 Non-Reactive    Orders Only on 11/18/2020   Component Date Value    WBC 11/18/2020 7.4     RBC 11/18/2020 4.78     Hemoglobin 11/18/2020 14.2     Hematocrit 11/18/2020 44.0     MCV 11/18/2020 92.1     MCH 11/18/2020 29.7     MCHC 11/18/2020 32.3*    RDW 11/18/2020 13.6     Platelets 65/82/9582 292     MPV 11/18/2020 11.9     Neutrophils % 11/18/2020 57.9     Lymphocytes % 11/18/2020 28.5     Monocytes % 11/18/2020 10.3*    Eosinophils % 11/18/2020 2.2     Basophils % 11/18/2020 0.7     Neutrophils Absolute 11/18/2020 4.3     Immature Granulocytes # 11/18/2020 0.0     Lymphocytes Absolute 11/18/2020 2.1     Monocytes Absolute 11/18/2020 0.80     Eosinophils Absolute 11/18/2020 0.20     Basophils Absolute 11/18/2020 0.10     Vit D, 25-Hydroxy 11/18/2020 40.6     Cholesterol, Total 11/18/2020 188     Triglycerides 11/18/2020 125     HDL 11/18/2020 63*    LDL Calculated 11/18/2020 100     Hemoglobin A1C 11/18/2020 5.6     Sodium 11/18/2020 141     Potassium 11/18/2020 4.3     Chloride 11/18/2020 105     CO2 11/18/2020 26     Anion Gap 11/18/2020 10     Glucose 11/18/2020 95     BUN 11/18/2020 15     CREATININE 11/18/2020 0.5     GFR Non- 11/18/2020 >60     GFR  11/18/2020 >59     Calcium 11/18/2020 9.4     Total Protein 11/18/2020 7.3     Alb 11/18/2020 4.2     Total Bilirubin 11/18/2020 0.3     Alkaline Phosphatase 11/18/2020 for this visit. EMR Dragon/transcription disclaimer:Significant part of this  encounter note is electronic transcription/translationof spoken language to printed text. The electronic translation of spoken language may be erroneous, or at times, nonsensical words or phrases may be inadvertently transcribed.  Although I have reviewed the note for sucherrors, some may still exist.

## 2020-12-01 RX ORDER — DESVENLAFAXINE 100 MG/1
TABLET, EXTENDED RELEASE ORAL
Qty: 90 TABLET | Refills: 1 | Status: SHIPPED | OUTPATIENT
Start: 2020-12-01 | End: 2021-06-01 | Stop reason: SDUPTHER

## 2020-12-01 NOTE — TELEPHONE ENCOUNTER
Rosalba called requesting a refill of the below medication which has been pended for you:     Requested Prescriptions     Pending Prescriptions Disp Refills    desvenlafaxine succinate (PRISTIQ) 100 MG TB24 extended release tablet [Pharmacy Med Name: DESVENLAFAXINE 100MG TAB *DD] 90 tablet 1     Sig: TAKE 1 TABLET BY MOUTH ONCE DAILY.        Last Appointment Date: 11/24/2020  Next Appointment Date: Visit date not found    No Known Allergies

## 2021-01-14 RX ORDER — BUSPIRONE HYDROCHLORIDE 15 MG/1
TABLET ORAL
Qty: 120 TABLET | Refills: 0 | Status: SHIPPED | OUTPATIENT
Start: 2021-01-14 | End: 2021-03-15

## 2021-01-29 RX ORDER — METFORMIN HYDROCHLORIDE 500 MG/1
1000 TABLET, EXTENDED RELEASE ORAL
Qty: 60 TABLET | Refills: 0 | Status: SHIPPED | OUTPATIENT
Start: 2021-01-29 | End: 2021-03-15

## 2021-01-29 NOTE — TELEPHONE ENCOUNTER
Rosalba called requesting a refill of the below medication which has been pended for you:     Requested Prescriptions     Pending Prescriptions Disp Refills    metFORMIN (GLUCOPHAGE-XR) 500 MG extended release tablet [Pharmacy Med Name: METFORMIN   MG TABLET] 60 tablet 0     Sig: TAKE 2 TABLETS BY MOUTH DAILY WITH SUPPER       Last Appointment Date: 11/24/2020  Next Appointment Date: 6/1/2021    No Known Allergies

## 2021-03-15 RX ORDER — METFORMIN HYDROCHLORIDE 500 MG/1
1000 TABLET, EXTENDED RELEASE ORAL
Qty: 60 TABLET | Refills: 3 | Status: SHIPPED | OUTPATIENT
Start: 2021-03-15 | End: 2021-11-09

## 2021-03-15 RX ORDER — BUSPIRONE HYDROCHLORIDE 15 MG/1
TABLET ORAL
Qty: 120 TABLET | Refills: 3 | Status: SHIPPED | OUTPATIENT
Start: 2021-03-15 | End: 2021-11-09

## 2021-03-15 NOTE — TELEPHONE ENCOUNTER
Rosalba called requesting a refill of the below medication which has been pended for you:     Requested Prescriptions     Pending Prescriptions Disp Refills    metFORMIN (GLUCOPHAGE-XR) 500 MG extended release tablet [Pharmacy Med Name: METFORMIN HCL  MG TAB*DD] 60 tablet 3     Sig: TAKE 2 TABLETS BY MOUTH DAILY WITH SUPPER.  busPIRone (BUSPAR) 15 MG tablet [Pharmacy Med Name: BUSPIRONE HCL 15 MG TABLET] 120 tablet 3     Sig: TAKE 1 TO 2 TABLETS BY MOUTH TWICE DAILY.        Last Appointment Date: 11/24/2020  Next Appointment Date: 6/1/2021    No Known Allergies

## 2021-03-22 ENCOUNTER — NURSE TRIAGE (OUTPATIENT)
Dept: OTHER | Facility: CLINIC | Age: 51
End: 2021-03-22

## 2021-03-22 ENCOUNTER — TELEMEDICINE (OUTPATIENT)
Dept: INTERNAL MEDICINE | Age: 51
End: 2021-03-22

## 2021-03-22 DIAGNOSIS — R09.82 POSTNASAL DRIP: ICD-10-CM

## 2021-03-22 DIAGNOSIS — J01.00 ACUTE NON-RECURRENT MAXILLARY SINUSITIS: Primary | ICD-10-CM

## 2021-03-22 DIAGNOSIS — H93.8X2 CONGESTION OF LEFT EAR: ICD-10-CM

## 2021-03-22 PROCEDURE — 99213 OFFICE O/P EST LOW 20 MIN: CPT | Performed by: INTERNAL MEDICINE

## 2021-03-22 RX ORDER — PREDNISONE 10 MG/1
10 TABLET ORAL 2 TIMES DAILY
Qty: 8 TABLET | Refills: 0 | Status: SHIPPED | OUTPATIENT
Start: 2021-03-22 | End: 2021-03-26

## 2021-03-22 RX ORDER — FLUTICASONE PROPIONATE 50 MCG
2 SPRAY, SUSPENSION (ML) NASAL DAILY
Qty: 1 BOTTLE | Refills: 0 | Status: SHIPPED | OUTPATIENT
Start: 2021-03-22

## 2021-03-22 RX ORDER — LEVOFLOXACIN 500 MG/1
500 TABLET, FILM COATED ORAL DAILY
Qty: 8 TABLET | Refills: 0 | Status: SHIPPED | OUTPATIENT
Start: 2021-03-22 | End: 2021-03-30

## 2021-03-22 ASSESSMENT — ENCOUNTER SYMPTOMS
CONSTIPATION: 0
COUGH: 0
ABDOMINAL PAIN: 0
WHEEZING: 0
SINUS PAIN: 1
SORE THROAT: 1
CHEST TIGHTNESS: 0
SINUS PRESSURE: 1

## 2021-03-22 NOTE — PROGRESS NOTES
Chief Complaint   Patient presents with    Otalgia     Sarted Yesterday    Pharyngitis    Headache     History of presenting illness:  Hermila Kelley is a53 y.o. female       TELEHEALTH EVALUATION -- Audio/Visual (During KDEBM-98 public health emergency)  Patient location-home  Pursuant to the emergency declaration under the [address] [place] [address] authority and the [employer] Act, this Virtual  Visit was conducted, with patient's consent, to reduce the patient's risk of exposure to COVID-19 and provide continuity of care for an established patient. Services were provided through a video synchronous discussion virtually to substitute for in-person clinic visit. 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    Generalized anxiety disorder 10/22/2017    IFG (impaired fasting glucose) 10/22/2017    Psoriasis 10/22/2017    IBS (irritable bowel syndrome) 10/22/2017    History of bone density study      Overview Note:     4/2018 normal      Abnormal liver function 08/15/2017    Foot fracture, left 08/15/2017    Foot pain 08/15/2017    Altered bowel function 10/31/2016     Overview Note:     Updating Deprecated Diagnoses      Elevated blood pressure 10/15/2016    Essential (primary) hypertension 10/15/2016     Past Medical History:   Diagnosis Date    Acquired deviated nasal septum     Anal bleeding 10/31/2016    Ankle injury     Asthma     Benign paroxysmal positional vertigo     Derangement of medial meniscus     L knee mild, clinical diagnosis 9/19/12 after turning foot and popping sound 9/16/12 while walking and turn.     Eustachian tube dysfunction     Excessive sleepiness     During the day    Generalized anxiety disorder     H/O varicose veins     History of sprain of foot     Osteoarthritis     Pain in joint of left knee     Palpitations     Psoriasis       Past Surgical History:   Procedure Laterality Date    APPENDECTOMY      BREAST SURGERY      lift and a left implant   [place]    Dr. Rose Dailey    ENDOSCOPY, COLON, DIAGNOSTIC      KNEE ARTHROSCOPY Bilateral     [place] COLONOSCOPY FLX DX W/COLLJ SPEC WHEN PFRMD N/A 2016    Dr Loly Morales    SINUS SURGERY      nasal septoplasty/ maxillary antrostomies   Ang[facility]lica Shade    Dr. Melani Campbell, [address]     Current Outpatient Medications   Medication Sig Dispense Refill    levoFLOXacin (LEVAQUIN) 500 MG tablet Take 1 tablet by mouth daily for 8 days 8 tablet 0    predniSONE (DELTASONE) 10 MG tablet Take 1 tablet by mouth 2 times daily for 4 days 8 tablet 0    fluticasone (FLONASE) 50 MCG/ACT nasal spray 2 sprays by Each Nostril route daily 1 Bottle 0    metFORMIN (GLUCOPHAGE-XR) 500 MG extended release tablet TAKE 2 TABLETS BY MOUTH DAILY WITH SUPPER. 60 tablet 3    busPIRone (BUSPAR) 15 MG tablet TAKE 1 TO 2 TABLETS BY MOUTH TWICE DAILY. 120 tablet 3    desvenlafaxine succinate (PRISTIQ) 100 MG TB24 extended release tablet TAKE 1 TABLET BY MOUTH ONCE DAILY. 90 tablet 1    albuterol sulfate HFA (VENTOLIN HFA) 108 (90 Base) MCG/ACT inhaler Inhale 2 puffs into the lungs every 6 hours as needed for Wheezing 1 Inhaler 3    naproxen (NAPROSYN) 500 MG tablet Take 1 tablet by mouth 2 times daily 20 tablet 0    Apremilast (OTEZLA) 30 MG TABS Take 30 mg by mouth 2 times daily       No current facility-administered medications for this visit.       No Known Allergies  Social History     Tobacco Use    Smoking status: Former Smoker     Quit date:      Years since quittin.2    Smokeless tobacco: Never Used Substance Use Topics    Alcohol use: Yes     Comment: occ      Family History   Problem Relation Age of Onset    Colon Cancer Neg Hx     Colon Polyps Neg Hx     Esophageal Cancer Neg Hx     Liver Cancer Neg Hx     Liver Disease Neg Hx     Stomach Cancer Neg Hx     Rectal Cancer Neg Hx        Review of Systems   Constitutional: Positive for fatigue. Negative for chills and fever. HENT: Positive for congestion, postnasal drip, sinus pressure, sinus pain and sore throat. Negative for nosebleeds. Respiratory: Negative for cough, chest tightness and wheezing. Cardiovascular: Negative for chest pain, palpitations and leg swelling. Gastrointestinal: Negative for abdominal pain and constipation. Genitourinary: Negative for dysuria and urgency. Musculoskeletal: Negative. Negative for arthralgias. Skin: Negative for rash. Neurological: Negative for dizziness and headaches. Psychiatric/Behavioral: Negative. There were no vitals filed for this visit. There is no height or weight on file to calculate BMI. No flowsheet data found.      Physical Exam  PHYSICAL EXAMINATION:  [ INSTRUCTIONS:  \"[x]\" Indicates a positive item  \"[]\" Indicates a negative item  -- DELETE ALL ITEMS NOT EXAMINED]  [x] Alert  [x] Oriented to person/place/time    [x] No apparent distress  [] Toxic appearing    [] Face flushed appearing [] Sclera clear  [] Lips are cyanotic      [x] Breathing appears normal  [] Appears tachypneic      [] Rash on visible skin    [x] Cranial Nerves II-XII grossly intact    [x] Motor grossly intact in visible upper extremities    [x] Motor grossly intact in visible lower extremities    [x] Normal Mood  [] Anxious appearing    [] Depressed appearing  [] Confused appearing      [] Poor short term memory  [] Poor long term memory    [] OTHER:      Due to this being a TeleHealth encounter, evaluation of the following organ systems is limited:

## 2021-03-22 NOTE — LETTER
[facility]  [address] [place] [id number]  Phone: [phone]  Fax: [phone]    May Kulkarni MD        March 22, 2021     Patient: Ileana Granados   YOB: 1970   Date of Visit: 3/22/2021       To Whom It May Concern: It is my medical opinion that Dillon Clark may return to work on 03/23/2021. Please excuse 03/22/2021. If you have any questions or concerns, please don't hesitate to call.     Sincerely,        May Kulkarni MD

## 2021-03-22 NOTE — TELEPHONE ENCOUNTER
Patient called Stephanie Hamilton at [facility])  with red flag complaint. Brief description of triage: see below    Triage indicates for patient to be seen in office today or tomorrow. Pt instructed to go to [facility] if unable to obtain an appt. Today or tomorrow. Pt agreeable to POC. Care advice provided, patient verbalizes understanding; denies any other questions or concerns; instructed to call back for any new or worsening symptoms. Writer provided warm transfer to [facility] at [facility] for appointment scheduling. Attention Provider: Thank you for allowing me to participate in the care of your patient. The patient was connected to triage in response to information provided to the ECC. Please do not respond through this encounter as the response is not directed to a shared pool. Reason for Disposition   All other earaches (Exceptions: earache lasting < 1 hour, and earache from air travel)    Answer Assessment - Initial Assessment Questions  1. LOCATION: \"Which ear is involved? \"      Left ear     2. ONSET: \"When did the ear start hurting\"       Yesterday    3. SEVERITY: \"How bad is the pain? \"  (Scale 1-10; mild, moderate or severe)    - MILD (1-3): doesn't interfere with normal activities     - MODERATE (4-7): interferes with normal activities or awakens from sleep     - SEVERE (8-10): excruciating pain, unable to do any normal activities       2-3/10 - states it more stocked up    4. URI SYMPTOMS: \"Do you have a runny nose or cough? \"      Pt denies - states sore throat    5. FEVER: \"Do you have a fever? \" If so, ask: \"What is your temperature, how was it measured, and when did it start? \"      Pt denies    6. CAUSE: \"Have you been swimming recently? \", \"How often do you use Q-TIPS? \", \"Have you had any recent air travel or scuba diving? \"      Pt denies    7. OTHER SYMPTOMS: \"Do you have any other symptoms? \" (e.g., headache, stiff neck, dizziness, vomiting, runny nose, decreased hearing)      Sore throat that started this morning; sinus HA per pt    8. PREGNANCY: \"Is there any chance you are pregnant? \" \"When was your last menstrual period? \"      N/A    Protocols used: EARACHE-ADULT-[place]

## 2021-05-26 DIAGNOSIS — E66.09 EXOGENOUS OBESITY: ICD-10-CM

## 2021-05-26 DIAGNOSIS — R73.01 IFG (IMPAIRED FASTING GLUCOSE): ICD-10-CM

## 2021-05-26 DIAGNOSIS — Z23 NEED FOR IMMUNIZATION AGAINST INFLUENZA: ICD-10-CM

## 2021-05-26 DIAGNOSIS — I10 ESSENTIAL (PRIMARY) HYPERTENSION: ICD-10-CM

## 2021-05-26 DIAGNOSIS — E78.2 MIXED HYPERLIPIDEMIA: ICD-10-CM

## 2021-05-26 DIAGNOSIS — F41.1 GENERALIZED ANXIETY DISORDER: ICD-10-CM

## 2021-05-26 LAB
ALBUMIN SERPL-MCNC: 4.2 G/DL (ref 3.5–5.2)
ALP BLD-CCNC: 75 U/L (ref 35–104)
ALT SERPL-CCNC: 24 U/L (ref 5–33)
ANION GAP SERPL CALCULATED.3IONS-SCNC: 11 MMOL/L (ref 7–19)
AST SERPL-CCNC: 24 U/L (ref 5–32)
BACTERIA: NEGATIVE /HPF
BASOPHILS ABSOLUTE: 0.1 K/UL (ref 0–0.2)
BASOPHILS RELATIVE PERCENT: 0.8 % (ref 0–1)
BILIRUB SERPL-MCNC: 0.3 MG/DL (ref 0.2–1.2)
BILIRUBIN URINE: NEGATIVE
BLOOD, URINE: NEGATIVE
BUN BLDV-MCNC: 16 MG/DL (ref 6–20)
CALCIUM SERPL-MCNC: 9.5 MG/DL (ref 8.6–10)
CHLORIDE BLD-SCNC: 105 MMOL/L (ref 98–111)
CHOLESTEROL, TOTAL: 172 MG/DL (ref 160–199)
CLARITY: CLEAR
CO2: 26 MMOL/L (ref 22–29)
COLOR: ABNORMAL
CREAT SERPL-MCNC: 0.6 MG/DL (ref 0.5–0.9)
CRYSTALS, UA: ABNORMAL /HPF
EOSINOPHILS ABSOLUTE: 0.2 K/UL (ref 0–0.6)
EOSINOPHILS RELATIVE PERCENT: 2.8 % (ref 0–5)
EPITHELIAL CELLS, UA: 2 /HPF (ref 0–5)
GFR AFRICAN AMERICAN: >59
GFR NON-AFRICAN AMERICAN: >60
GLUCOSE BLD-MCNC: 93 MG/DL (ref 74–109)
GLUCOSE URINE: NEGATIVE MG/DL
HCT VFR BLD CALC: 42.6 % (ref 37–47)
HDLC SERPL-MCNC: 54 MG/DL (ref 65–121)
HEMOGLOBIN: 14.1 G/DL (ref 12–16)
HYALINE CASTS: 3 /HPF (ref 0–8)
IMMATURE GRANULOCYTES #: 0 K/UL
KETONES, URINE: ABNORMAL MG/DL
LDL CHOLESTEROL CALCULATED: 101 MG/DL
LEUKOCYTE ESTERASE, URINE: ABNORMAL
LYMPHOCYTES ABSOLUTE: 2.2 K/UL (ref 1.1–4.5)
LYMPHOCYTES RELATIVE PERCENT: 28.4 % (ref 20–40)
MCH RBC QN AUTO: 30.5 PG (ref 27–31)
MCHC RBC AUTO-ENTMCNC: 33.1 G/DL (ref 33–37)
MCV RBC AUTO: 92 FL (ref 81–99)
MONOCYTES ABSOLUTE: 0.9 K/UL (ref 0–0.9)
MONOCYTES RELATIVE PERCENT: 11.5 % (ref 0–10)
NEUTROPHILS ABSOLUTE: 4.3 K/UL (ref 1.5–7.5)
NEUTROPHILS RELATIVE PERCENT: 56.2 % (ref 50–65)
NITRITE, URINE: NEGATIVE
PDW BLD-RTO: 13.8 % (ref 11.5–14.5)
PH UA: 6.5 (ref 5–8)
PLATELET # BLD: 288 K/UL (ref 130–400)
PMV BLD AUTO: 12.4 FL (ref 9.4–12.3)
POTASSIUM SERPL-SCNC: 4 MMOL/L (ref 3.5–5)
PROTEIN UA: NEGATIVE MG/DL
RBC # BLD: 4.63 M/UL (ref 4.2–5.4)
RBC UA: 3 /HPF (ref 0–4)
SODIUM BLD-SCNC: 142 MMOL/L (ref 136–145)
SPECIFIC GRAVITY UA: 1.03 (ref 1–1.03)
TOTAL PROTEIN: 7.1 G/DL (ref 6.6–8.7)
TRIGL SERPL-MCNC: 86 MG/DL (ref 0–149)
TSH SERPL DL<=0.05 MIU/L-ACNC: 1.34 UIU/ML (ref 0.27–4.2)
UROBILINOGEN, URINE: 1 E.U./DL
VITAMIN D 25-HYDROXY: 54.4 NG/ML
WBC # BLD: 7.6 K/UL (ref 4.8–10.8)
WBC UA: 3 /HPF (ref 0–5)

## 2021-06-01 ENCOUNTER — TRANSCRIBE ORDERS (OUTPATIENT)
Dept: ADMINISTRATIVE | Facility: HOSPITAL | Age: 51
End: 2021-06-01

## 2021-06-01 ENCOUNTER — OFFICE VISIT (OUTPATIENT)
Dept: INTERNAL MEDICINE | Age: 51
End: 2021-06-01
Payer: COMMERCIAL

## 2021-06-01 VITALS
SYSTOLIC BLOOD PRESSURE: 118 MMHG | RESPIRATION RATE: 18 BRPM | DIASTOLIC BLOOD PRESSURE: 88 MMHG | WEIGHT: 203 LBS | HEIGHT: 68 IN | BODY MASS INDEX: 30.77 KG/M2

## 2021-06-01 DIAGNOSIS — E66.09 EXOGENOUS OBESITY: ICD-10-CM

## 2021-06-01 DIAGNOSIS — F41.1 GENERALIZED ANXIETY DISORDER: ICD-10-CM

## 2021-06-01 DIAGNOSIS — I10 ESSENTIAL (PRIMARY) HYPERTENSION: ICD-10-CM

## 2021-06-01 DIAGNOSIS — E78.2 MIXED HYPERLIPIDEMIA: ICD-10-CM

## 2021-06-01 DIAGNOSIS — Z12.31 ENCOUNTER FOR SCREENING MAMMOGRAM FOR BREAST CANCER: ICD-10-CM

## 2021-06-01 DIAGNOSIS — Z12.31 ENCOUNTER FOR SCREENING MAMMOGRAM FOR MALIGNANT NEOPLASM OF BREAST: Primary | ICD-10-CM

## 2021-06-01 DIAGNOSIS — R73.01 IFG (IMPAIRED FASTING GLUCOSE): ICD-10-CM

## 2021-06-01 DIAGNOSIS — Z00.00 ANNUAL PHYSICAL EXAM: Primary | ICD-10-CM

## 2021-06-01 PROCEDURE — 99396 PREV VISIT EST AGE 40-64: CPT | Performed by: INTERNAL MEDICINE

## 2021-06-01 RX ORDER — DESVENLAFAXINE 100 MG/1
100 TABLET, EXTENDED RELEASE ORAL DAILY
Qty: 90 TABLET | Refills: 1 | Status: SHIPPED | OUTPATIENT
Start: 2021-06-01 | End: 2021-06-03 | Stop reason: SDUPTHER

## 2021-06-01 RX ORDER — PHENTERMINE HYDROCHLORIDE 37.5 MG/1
37.5 TABLET ORAL
Qty: 30 TABLET | Refills: 1 | Status: SHIPPED | OUTPATIENT
Start: 2021-06-01 | End: 2022-03-01 | Stop reason: SDUPTHER

## 2021-06-01 RX ORDER — GUSELKUMAB 100 MG/ML
100 INJECTION SUBCUTANEOUS ONCE
COMMUNITY
End: 2022-11-02 | Stop reason: ALTCHOICE

## 2021-06-01 SDOH — ECONOMIC STABILITY: FOOD INSECURITY: WITHIN THE PAST 12 MONTHS, THE FOOD YOU BOUGHT JUST DIDN'T LAST AND YOU DIDN'T HAVE MONEY TO GET MORE.: NEVER TRUE

## 2021-06-01 SDOH — ECONOMIC STABILITY: FOOD INSECURITY: WITHIN THE PAST 12 MONTHS, YOU WORRIED THAT YOUR FOOD WOULD RUN OUT BEFORE YOU GOT MONEY TO BUY MORE.: NEVER TRUE

## 2021-06-01 ASSESSMENT — ENCOUNTER SYMPTOMS
WHEEZING: 0
CONSTIPATION: 0
CHEST TIGHTNESS: 0
SORE THROAT: 0
COUGH: 0
ABDOMINAL PAIN: 0

## 2021-06-01 ASSESSMENT — SOCIAL DETERMINANTS OF HEALTH (SDOH): HOW HARD IS IT FOR YOU TO PAY FOR THE VERY BASICS LIKE FOOD, HOUSING, MEDICAL CARE, AND HEATING?: NOT HARD AT ALL

## 2021-06-01 NOTE — PROGRESS NOTES
Chief Complaint   Patient presents with    Annual Exam     Pt has a Pap Smear scheduled with Dr. Turner Best in July 2021     History of presenting illness:  Pawel Robins is a53 y.o. female who presents today for follow up on her chronic medical conditions as noted below. 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    Generalized anxiety disorder 10/22/2017    IFG (impaired fasting glucose) 10/22/2017    Psoriasis 10/22/2017    IBS (irritable bowel syndrome) 10/22/2017    History of bone density study      Overview Note:     4/2018 normal      Abnormal liver function 08/15/2017    Foot fracture, left 08/15/2017    Foot pain 08/15/2017    Altered bowel function 10/31/2016     Overview Note:     Updating Deprecated Diagnoses      Elevated blood pressure 10/15/2016    Essential (primary) hypertension 10/15/2016     Past Medical History:   Diagnosis Date    Acquired deviated nasal septum     Anal bleeding 10/31/2016    Ankle injury     Asthma     Benign paroxysmal positional vertigo     Derangement of medial meniscus     L knee mild, clinical diagnosis 9/19/12 after turning foot and popping sound 9/16/12 while walking and turn.     Eustachian tube dysfunction     Excessive sleepiness     During the day    Generalized anxiety disorder     H/O varicose veins     History of sprain of foot     Osteoarthritis     Pain in joint of left knee     Palpitations     Psoriasis       Past Surgical History:   Procedure Laterality Date    APPENDECTOMY      BREAST SURGERY      lift and a left implant   [facility] Parents    Dr. Felipe Nunes    ENDOSCOPY, COLON, DIAGNOSTIC      KNEE ARTHROSCOPY Bilateral     AR COLONOSCOPY FLX DX W/COLLJ SPEC WHEN PFRMD N/A 2016    Dr Humza Pablo    SINUS SURGERY      nasal septoplasty/ maxillary antrostomies   Emiliano Velásquez, [place]     Current Outpatient Medications   Medication Sig Dispense Refill    guselkumab (TREMFYA) 100 MG/ML SOSY injection Inject 100 mg into the skin once      phentermine (ADIPEX-P) 37.5 MG tablet Take 1 tablet by mouth every morning (before breakfast) for 30 days. 30 tablet 1    desvenlafaxine succinate (PRISTIQ) 100 MG TB24 extended release tablet Take 1 tablet by mouth daily 90 tablet 1    fluticasone (FLONASE) 50 MCG/ACT nasal spray 2 sprays by Each Nostril route daily 1 Bottle 0    metFORMIN (GLUCOPHAGE-XR) 500 MG extended release tablet TAKE 2 TABLETS BY MOUTH DAILY WITH SUPPER. 60 tablet 3    busPIRone (BUSPAR) 15 MG tablet TAKE 1 TO 2 TABLETS BY MOUTH TWICE DAILY. 120 tablet 3    albuterol sulfate HFA (VENTOLIN HFA) 108 (90 Base) MCG/ACT inhaler Inhale 2 puffs into the lungs every 6 hours as needed for Wheezing 1 Inhaler 3    naproxen (NAPROSYN) 500 MG tablet Take 1 tablet by mouth 2 times daily 20 tablet 0     No current facility-administered medications for this visit.      No Known Allergies  Social History     Tobacco Use    Smoking status: Former Smoker     Quit date:      Years since quittin.4    Smokeless tobacco: Never Used   Substance Use Topics    Alcohol use: Yes     Comment: occ      Family History   Problem Relation Age of Date Value    Vit D, 25-Hydroxy 05/26/2021 54.4     TSH 05/26/2021 1.340     Color, UA 05/26/2021 DARK YELLOW*    Clarity, UA 05/26/2021 Clear     Glucose, Ur 05/26/2021 Negative     Bilirubin Urine 05/26/2021 Negative     Ketones, Urine 05/26/2021 TRACE*    Specific Gravity, UA 05/26/2021 1.027     Blood, Urine 05/26/2021 Negative     pH, UA 05/26/2021 6.5     Protein, UA 05/26/2021 Negative     Urobilinogen, Urine 05/26/2021 1.0     Nitrite, Urine 05/26/2021 Negative     Leukocyte Esterase, Urine 05/26/2021 TRACE*    Cholesterol, Total 05/26/2021 172     Triglycerides 05/26/2021 86     HDL 05/26/2021 54*    LDL Calculated 05/26/2021 101     WBC 05/26/2021 7.6     RBC 05/26/2021 4.63     Hemoglobin 05/26/2021 14.1     Hematocrit 05/26/2021 42.6     MCV 05/26/2021 92.0     MCH 05/26/2021 30.5     MCHC 05/26/2021 33.1     RDW 05/26/2021 13.8     Platelets 80/45/9964 288     MPV 05/26/2021 12.4*    Neutrophils % 05/26/2021 56.2     Lymphocytes % 05/26/2021 28.4     Monocytes % 05/26/2021 11.5*    Eosinophils % 05/26/2021 2.8     Basophils % 05/26/2021 0.8     Neutrophils Absolute 05/26/2021 4.3     Immature Granulocytes # 05/26/2021 0.0     Lymphocytes Absolute 05/26/2021 2.2     Monocytes Absolute 05/26/2021 0.90     Eosinophils Absolute 05/26/2021 0.20     Basophils Absolute 05/26/2021 0.10     Sodium 05/26/2021 142     Potassium 05/26/2021 4.0     Chloride 05/26/2021 105     CO2 05/26/2021 26     Anion Gap 05/26/2021 11     Glucose 05/26/2021 93     BUN 05/26/2021 16     CREATININE 05/26/2021 0.6     GFR Non- 05/26/2021 >60     GFR  05/26/2021 >59     Calcium 05/26/2021 9.5     Total Protein 05/26/2021 7.1     Albumin 05/26/2021 4.2     Total Bilirubin 05/26/2021 0.3     Alkaline Phosphatase 05/26/2021 75     ALT 05/26/2021 24     AST 05/26/2021 24     Bacteria, UA 05/26/2021 NEGATIVE*    Crystals, UA 05/26/2021 NEG*    Hyaline Casts, UA 05/26/2021 3     WBC, UA 05/26/2021 3     RBC, UA 05/26/2021 3     Epithelial Cells, UA 05/26/2021 2            ASSESSMENT/PLAN:  ANNUAL PHYSICAL  * Pap and pelvic per GYN  * Visit for screening mammogram  * Colonoscopy 2016/ repeat 5 yrs  * Bone density 4/18 nl       Generalized anxiety disorder-Has had significant increased stress levels as above- gong to school ( taking test in [employer])  Her plan today is  #1 cont Pristiq 100 mg daily   #2 cont  BuSpar  15 mg every 12 hours, patient can alternate 15-30 mg depending on her needs     LFT now nl 5/2021  Previously  Spring 2019 -elevated transaminases alt 104 (41) and ast 56( 36))     Mixed hyperlipidemia,   mild elevated LDL- 101 (100 (111(106) (120) =   lower fat diet and weight loss is recommended     Essential (primary) hypertension-her blood pressure has been in good range, at this time no prescription is needed, patient will continue to monitor this closely      Obesity- bmi 30.87  Now on diet  IFG a1c 5.6 in 11/2020 (5.7 (5.4)  rx phentermin  Diet and weight loss DW in length with patient. I have advised patient to follow strict lower carbohydrate dietary regimen and engage in  exercise routine for 30-45 minutes at least 3-4  Days per week. Patient was provided with strategies how to  shift from personal maladaptive eating patterns toward healthful eating and exercise. . Behavioral therapy components of self monitoring, goal setting, stimulus control and social support were emphasized. Along with above, I am prescribing this patient  Phentermin ( [facility] above [facility]  While staying on this short term Phentermin treatment regimen, the goal is to loose at least 1 lbs  of weight per week.  2 month follow up appointment is recommended.     Vit D level good range  54 in 5/2021 (40  (52)  Cont current rx              Orders Placed This Encounter   Procedures    ALPESH DIGITAL SCREEN W OR WO CAD BILATERAL    Hemoglobin A1C   

## 2021-06-01 NOTE — LETTER
CONTROLLED SUBSTANCE MEDICATION AGREEMENT     Patient Name: Rudolph Saleh  Patient YOB: 1970   I understand, that controlled substance medications may be used to help better manage my symptoms and to improve my ability to function at home, work and in social settings. However, I also understand that these medications do have risks, which have been discussed with me, including possible development of physical or psychological dependence. I understand that successful treatment requires mutual trust and honesty between me and my provider. I understand and agree that following this Medication Agreement is necessary in continuing my provider-patient relationship and the success of my treatment plan. Explanation from my Provider: Benefits and Goals of Controlled Substance Medications: There are two potential goals for your treatment: (1) decreased pain and suffering (2) improved daily life functions. There are many possible treatments for your chronic condition(s). Alternatives such as physical therapy, yoga, massage, home daily exercise, meditation, relaxation techniques, injections, chiropractic manipulations, surgery, cognitive therapy, hypnosis and many medications that are not habit-forming may be used. Use of controlled substance medications may be helpful, but they are unlikely to resolve all symptoms or restore all function. Explanation from my Provider: Risks of Controlled Substance Medications:  Opioid pain medications: These medications can lead to problems such as addiction/dependence, sedation, lightheadedness/dizziness, memory issues, falls, constipation, nausea, or vomiting. They may also impair the ability to drive or operate machinery. Additionally, these medications may lower testosterone levels, leading to loss of bone strength, stamina and sex drive.   They may cause problems with breathing, sleep apnea and reduced coughing, which is especially dangerous for patients with lung disease. Overdose or dangerous interactions with alcohol and other medications may occur, leading to death. Hyperalgesia may develop, which means patients receiving opioids for the treatment of pain may become more sensitive to certain painful stimuli, and in some cases, experience pain from ordinarily non-painful stimuli. Women between the ages of 14-53 who could become pregnant should carefully weigh the risks and benefits of opioids with their physicians, as these medications increase the risk of pregnancy complications, including miscarriage,  delivery and stillbirth. It is also possible for babies to be born addicted to opioids. Opioid dependence withdrawal symptoms may include; feelings of uneasiness, increased pain, irritability, belly pain, diarrhea, sweats and goose-flesh. Benzodiazepines and non-benzodiazepine sleep medications: These medications can lead to problems such as addiction/dependence, sedation, fatigue, lightheadedness, dizziness, incoordination, falls, depression, hallucinations, and impaired judgment, memory and concentration. The ability to drive and operate machinery may also be affected. Abnormal sleep-related behaviors have been reported, including sleepwalking, driving, making telephone calls, eating, or having sex while not fully awake. These medications can suppress breathing and worsen sleep apnea, particularly when combined with alcohol or other sedating medications, potentially leading to death. Dependence withdrawal symptoms may include tremors, anxiety, hallucinations and seizures. Stimulants:  Common adverse effects include addiction/dependence, increased blood  pressure and heart rate, decreased appetite, nausea, involuntary weight loss, insomnia,                                                                                                                     Initials:_______   irritability, and headaches.   These risks may increase when these medications are combined with other stimulants, such as caffeine pills or energy drinks, certain weight loss supplements and oral decongestants. Dependence withdrawal symptoms may include depressed mood, loss of interest, suicidal thoughts, anxiety, fatigue, appetite changes and agitation. Testosterone replacement therapy:  Potential side effects include increased risk of stroke and heart attack, blood clots, increased blood pressure, increased cholesterol, enlarged prostate, sleep apnea, irritability/aggression and other mood disorders, and decreased fertility. I agree and understand that I and my prescriber have the following rights and responsibilities regarding my treatment plan:     1. MY RIGHTS:  To be informed of my treatment and medication plan. To be an active participant in my health and wellbeing. 2. MY RESPONSIBILITY AND UNDERSTANDING FOR USE OF MEDICATIONS   I will take medications at the dose and frequency as directed. For my safety, I will not increase or change how I take my medications without the recommendation of my healthcare provider.  I will actively participate in any program recommended by my provider which may improve function, including social, physical, psychological programs.  I will not take my medications with alcohol or other drugs not prescribed to me. I understand that drinking alcohol with my medications increases the chances of side effects, including reduced breathing rate and could lead to personal injury when operating machinery.  I understand that if I have a history of substance use disorders, including alcohol or other illicit drugs, that I may be at increased risk of addiction to my medications.  I agree to notify my provider immediately if I should become pregnant so that my treatment plan can be adjusted.    I agree and understand that I shall only receive controlled substance medications from the prescriber that signed this agreement unless there is written agreement among other prescribers of controlled substances outlining the responsibility of the medications being prescribed.  I understand that the if the controlled medication is not helping to achieve goals, the dosage may be tapered and no longer prescribed. 3. MY RESPONSIBILITY FOR COMMUNICATION / PRESCRIPTION RENEWALS   I agree that all controlled substance medications that I take will be prescribed only by my provider. If another healthcare provider prescribes me medication in an emergency, I will notify my provider within seventy-two (72) hours.  I will arrange for refills at the prescribed interval ONLY during regular office hours. I will not ask for refills earlier than agreed, after-hours, on holidays or weekends. Refills may take up to 72 hours for processing and prescriptions to reach the pharmacy.  I will inform my other health care providers that I am taking these medications and of the existence of this [id number]. In the event of an emergency, I will provide the same information to the emergency department prescribers.  I will keep my provider updated on the pharmacy I am using for controlled medication prescription filling. Initials:_______  4. MY RESPONSIBILITY FOR PROTECTING MEDICATIONS   I will protect my prescriptions and medications. I understand that lost or misplaced prescriptions will not be replaced.  I will keep medications only for my own use and will not share them with others. I will keep all medications away from children.  I agree that if my medications are adjusted or discontinued, I will properly dispose of any remaining medications. I understand that I will be required to dispose of any remaining controlled medications as, directed by my prescriber, prior to being provided with any prescriptions for other controlled medications.   Medication drop box locations can be found at: [email or web address]    5. MY RESPONSIBILITY WITH ILLEGAL DRUGS    I will not use illegal or street drugs or another person's prescription medications not prescribed to me.  If there are identified addiction type symptoms, then referral to a program may be provided by my provider and I agree to follow through with this recommendation. 6. MY RESPONSIBILITY FOR COOPERATION WITH INVESTIGATIONS   I understand that my provider will comply with any applicable law and may discuss my use and/or possible misuse/abuse of controlled substances and alcohol, as appropriate, with any health care provider involved in my care, pharmacist, or legal authority.  I authorize my provider and pharmacy to cooperate fully with law enforcement agencies (as permitted by law) in the investigation of any possible misuse, sale, or other diversion of my controlled substances.  I agree to waive any applicable privilege or right of privacy or confidentiality with respect to these authorizations. 7. PROVIDERS RIGHT TO MONITOR FOR SAFETY: PRESCRIPTION MONITORING / DRUG TESTING   I consent to drug/toxicology screening and will submit to a drug screen upon my providers request to assure I am only taking the prescribed drugs for my safety monitoring. I understand that a drug screen is a laboratory test in which a sample of my urine, blood or saliva is checked to see what drugs I have been taking. This may entail an observed urine specimen, which means that a nurse or other health care provider may watch me provide urine, and I will cooperate if I am asked to provide an observed specimen.  I understand that my provider will check a copy of my State Prescription Monitoring Program () Report in order to safely prescribe medications.  Pill Counts: I consent to pill counts when requested.   I may be asked to bring all my prescribed controlled substance medications, in their original bottles, to all of my scheduled appointments. In addition, my provider may ask me to come to the practice at any time for a random pill count. 8. TERMINATION OF THIS AGREEMENT  For my safety, my prescriber has the right to stop prescribing controlled substance medications and may end this agreement. Initials:_______   Conditions that may result in termination of this agreement:  a. I do not show any improvement in pain, or my activity has not improved. b. I develop rapid tolerance or loss of improvement, as described in my treatment plan.  c. I develop significant side effects from the medication. d. My behavior is not consistent with the responsibilities outlined above, thereby causing safety concerns to continue prescribing controlled substance medications. e. I fail to follow the terms of this agreement. f. Other:____________________________       UNDERSTANDING THIS MEDICATION AGREEMENT:    I have read the above and have had all my questions answered. For chronic disease management, I know that my symptoms can be managed with many types of treatments. A chronic medication trial may be part of my treatment, but I must be an active participant in my care. Medication therapy is only one part of my symptom management plan. In some cases, there may be limited scientific evidence to support the chronic use of certain medications to improve symptoms and daily function. Furthermore, in certain circumstances, there may be scientific information that suggests that the use of chronic controlled substances may worsen my symptoms and increase my risk of unintentional death directly related to this medication therapy. I know that if my provider feels my risk from controlled medications is greater than my benefit, I will have my controlled substance medication(s) compassionately lowered or removed altogether.      I further agree to allow this office to

## 2021-06-03 RX ORDER — DESVENLAFAXINE 100 MG/1
100 TABLET, EXTENDED RELEASE ORAL DAILY
Qty: 30 TABLET | Refills: 0 | Status: SHIPPED | OUTPATIENT
Start: 2021-06-03 | End: 2021-07-01

## 2021-06-25 ENCOUNTER — APPOINTMENT (OUTPATIENT)
Dept: MAMMOGRAPHY | Facility: HOSPITAL | Age: 51
End: 2021-06-25

## 2021-07-01 RX ORDER — DESVENLAFAXINE 100 MG/1
TABLET, EXTENDED RELEASE ORAL
Qty: 30 TABLET | Refills: 3 | Status: SHIPPED | OUTPATIENT
Start: 2021-07-01 | End: 2022-01-04 | Stop reason: SDUPTHER

## 2021-07-08 ENCOUNTER — HOSPITAL ENCOUNTER (OUTPATIENT)
Dept: MAMMOGRAPHY | Facility: HOSPITAL | Age: 51
Discharge: HOME | End: 2021-07-08
Admitting: INTERNAL MEDICINE

## 2021-07-08 DIAGNOSIS — Z12.31 ENCOUNTER FOR SCREENING MAMMOGRAM FOR MALIGNANT NEOPLASM OF BREAST: ICD-10-CM

## 2021-07-08 PROCEDURE — 77063 BREAST TOMOSYNTHESIS BI: CPT

## 2021-07-08 PROCEDURE — 77067 SCR MAMMO BI INCL CAD: CPT

## 2021-07-09 DIAGNOSIS — Z12.31 ENCOUNTER FOR SCREENING MAMMOGRAM FOR BREAST CANCER: ICD-10-CM

## 2021-11-09 RX ORDER — METFORMIN HYDROCHLORIDE 500 MG/1
1000 TABLET, EXTENDED RELEASE ORAL
Qty: 60 TABLET | Refills: 3 | Status: SHIPPED | OUTPATIENT
Start: 2021-11-09 | End: 2022-11-02 | Stop reason: ALTCHOICE

## 2021-11-09 RX ORDER — BUSPIRONE HYDROCHLORIDE 15 MG/1
TABLET ORAL
Qty: 120 TABLET | Refills: 3 | Status: SHIPPED | OUTPATIENT
Start: 2021-11-09 | End: 2022-07-20

## 2021-11-09 NOTE — TELEPHONE ENCOUNTER
Rosalba called requesting a refill of the below medication which has been pended for you:     Requested Prescriptions     Pending Prescriptions Disp Refills    metFORMIN (GLUCOPHAGE-XR) 500 MG extended release tablet [Pharmacy Med Name: METFORMIN HYDROCHLORIDE ER 500MG TABLET EXTENDED RELEASE 24 HOUR] 60 tablet 3     Sig: TAKE 2 TABLETS BY MOUTH DAILY WITH SUPPER.  busPIRone (BUSPAR) 15 MG tablet [Pharmacy Med Name: BUSPIRONE HYDROCHLORIDE 15MG TABLET] 120 tablet 3     Sig: TAKE 1 TO 2 TABLETS BY MOUTH TWICE DAILY.        Last Appointment Date: 6/1/2021  Next Appointment Date: 12/7/2021    No Known Allergies

## 2021-12-01 DIAGNOSIS — I10 ESSENTIAL (PRIMARY) HYPERTENSION: ICD-10-CM

## 2021-12-01 DIAGNOSIS — Z12.31 ENCOUNTER FOR SCREENING MAMMOGRAM FOR BREAST CANCER: ICD-10-CM

## 2021-12-01 DIAGNOSIS — E78.2 MIXED HYPERLIPIDEMIA: ICD-10-CM

## 2021-12-01 DIAGNOSIS — R73.01 IFG (IMPAIRED FASTING GLUCOSE): ICD-10-CM

## 2021-12-01 DIAGNOSIS — E66.09 EXOGENOUS OBESITY: ICD-10-CM

## 2021-12-01 DIAGNOSIS — Z00.00 ANNUAL PHYSICAL EXAM: ICD-10-CM

## 2021-12-01 DIAGNOSIS — F41.1 GENERALIZED ANXIETY DISORDER: ICD-10-CM

## 2021-12-01 LAB
ALBUMIN SERPL-MCNC: 4.2 G/DL (ref 3.5–5.2)
ALP BLD-CCNC: 81 U/L (ref 35–104)
ALT SERPL-CCNC: 18 U/L (ref 5–33)
ANION GAP SERPL CALCULATED.3IONS-SCNC: 10 MMOL/L (ref 7–19)
AST SERPL-CCNC: 21 U/L (ref 5–32)
BILIRUB SERPL-MCNC: 0.4 MG/DL (ref 0.2–1.2)
BUN BLDV-MCNC: 17 MG/DL (ref 6–20)
CALCIUM SERPL-MCNC: 9.2 MG/DL (ref 8.6–10)
CHLORIDE BLD-SCNC: 107 MMOL/L (ref 98–111)
CHOLESTEROL, TOTAL: 203 MG/DL (ref 160–199)
CO2: 26 MMOL/L (ref 22–29)
CREAT SERPL-MCNC: 0.6 MG/DL (ref 0.5–0.9)
GFR AFRICAN AMERICAN: >59
GFR NON-AFRICAN AMERICAN: >60
GLUCOSE BLD-MCNC: 96 MG/DL (ref 74–109)
HBA1C MFR BLD: 5.5 % (ref 4–6)
HDLC SERPL-MCNC: 65 MG/DL (ref 65–121)
LDL CHOLESTEROL CALCULATED: 121 MG/DL
POTASSIUM SERPL-SCNC: 3.9 MMOL/L (ref 3.5–5)
SODIUM BLD-SCNC: 143 MMOL/L (ref 136–145)
TOTAL PROTEIN: 7 G/DL (ref 6.6–8.7)
TRIGL SERPL-MCNC: 83 MG/DL (ref 0–149)

## 2021-12-03 LAB
QUANTI TB GOLD PLUS: NEGATIVE
QUANTI TB1 MINUS NIL: 0.01 IU/ML (ref 0–0.34)
QUANTI TB2 MINUS NIL: 0.01 IU/ML (ref 0–0.34)
QUANTIFERON MITOGEN: >10 IU/ML
QUANTIFERON NIL: 0.05 IU/ML

## 2021-12-07 ENCOUNTER — OFFICE VISIT (OUTPATIENT)
Dept: INTERNAL MEDICINE | Age: 51
End: 2021-12-07
Payer: COMMERCIAL

## 2021-12-07 VITALS
OXYGEN SATURATION: 100 % | HEART RATE: 80 BPM | BODY MASS INDEX: 32.13 KG/M2 | DIASTOLIC BLOOD PRESSURE: 80 MMHG | RESPIRATION RATE: 18 BRPM | WEIGHT: 212 LBS | SYSTOLIC BLOOD PRESSURE: 128 MMHG | HEIGHT: 68 IN

## 2021-12-07 DIAGNOSIS — R73.01 IFG (IMPAIRED FASTING GLUCOSE): ICD-10-CM

## 2021-12-07 DIAGNOSIS — E55.9 VITAMIN D DEFICIENCY: ICD-10-CM

## 2021-12-07 DIAGNOSIS — Z00.00 ANNUAL PHYSICAL EXAM: ICD-10-CM

## 2021-12-07 DIAGNOSIS — F41.1 GENERALIZED ANXIETY DISORDER: ICD-10-CM

## 2021-12-07 DIAGNOSIS — I10 ESSENTIAL (PRIMARY) HYPERTENSION: ICD-10-CM

## 2021-12-07 DIAGNOSIS — E78.2 MIXED HYPERLIPIDEMIA: ICD-10-CM

## 2021-12-07 DIAGNOSIS — Z23 NEED FOR IMMUNIZATION AGAINST INFLUENZA: Primary | ICD-10-CM

## 2021-12-07 DIAGNOSIS — E66.09 EXOGENOUS OBESITY: ICD-10-CM

## 2021-12-07 PROCEDURE — 90674 CCIIV4 VAC NO PRSV 0.5 ML IM: CPT | Performed by: INTERNAL MEDICINE

## 2021-12-07 PROCEDURE — 90471 IMMUNIZATION ADMIN: CPT | Performed by: INTERNAL MEDICINE

## 2021-12-07 PROCEDURE — 99214 OFFICE O/P EST MOD 30 MIN: CPT | Performed by: INTERNAL MEDICINE

## 2021-12-07 ASSESSMENT — ENCOUNTER SYMPTOMS
ABDOMINAL PAIN: 0
COUGH: 0
CHEST TIGHTNESS: 0
CONSTIPATION: 0
SORE THROAT: 0
WHEEZING: 0

## 2021-12-07 NOTE — PROGRESS NOTES
Chief Complaint   Patient presents with    6 Month Follow-Up     History of presenting illness:  Donna Barraza is a49 y.o. female who presents today for follow up on her chronic medical conditions as noted below. 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    Generalized anxiety disorder 10/22/2017    IFG (impaired fasting glucose) 10/22/2017    Psoriasis 10/22/2017    IBS (irritable bowel syndrome) 10/22/2017    History of bone density study      Overview Note:     4/2018 normal      Abnormal liver function 08/15/2017    Foot fracture, left 08/15/2017    Foot pain 08/15/2017    Altered bowel function 10/31/2016     Overview Note:     Updating Deprecated Diagnoses      Elevated blood pressure 10/15/2016    Essential (primary) hypertension 10/15/2016     Past Medical History:   Diagnosis Date    Acquired deviated nasal septum     Anal bleeding 10/31/2016    Ankle injury     Asthma     Benign paroxysmal positional vertigo     Derangement of medial meniscus     L knee mild, clinical diagnosis 9/19/12 after turning foot and popping sound 9/16/12 while walking and turn.     Eustachian tube dysfunction     Excessive sleepiness     During the day    Generalized anxiety disorder     H/O varicose veins     History of sprain of foot     Osteoarthritis     Pain in joint of left knee     Palpitations     Psoriasis       Past Surgical History:   Procedure Laterality Date    APPENDECTOMY      BREAST SURGERY      lift and a left implant   Dania Mata    ENDOSCOPY, COLON, DIAGNOSTIC      KNEE ARTHROSCOPY Bilateral     TX COLONOSCOPY FLX DX W/COLLJ SPEC WHEN PFRMD N/A 12/19/2016    Dr Larry Rubio    SINUS SURGERY      nasal septoplasty/ maxillary antrostomies    TONSILLECTOMY      [facility], [id number] [facility] [address]     Current Outpatient Medications   Medication Sig Dispense Refill    metFORMIN (GLUCOPHAGE-XR) 500 MG extended release tablet TAKE 2 TABLETS BY MOUTH DAILY WITH SUPPER. 60 tablet 3    busPIRone (BUSPAR) 15 MG tablet TAKE 1 TO 2 TABLETS BY MOUTH TWICE DAILY. 120 tablet 3    desvenlafaxine succinate (PRISTIQ) 100 MG TB24 extended release tablet TAKE 1 TABLET BY MOUTH ONCE DAILY 30 tablet 3    guselkumab (TREMFYA) 100 MG/ML SOSY injection Inject 100 mg into the skin once      fluticasone (FLONASE) 50 MCG/ACT nasal spray 2 sprays by Each Nostril route daily 1 Bottle 0    naproxen (NAPROSYN) 500 MG tablet Take 1 tablet by mouth 2 times daily 20 tablet 0    albuterol sulfate HFA (VENTOLIN HFA) 108 (90 Base) MCG/ACT inhaler Inhale 2 puffs into the lungs every 6 hours as needed for Wheezing (Patient not taking: Reported on 2021) 1 Inhaler 3     No current facility-administered medications for this visit. No Known Allergies  Social History     Tobacco Use    Smoking status: Former Smoker     Quit date:      Years since quittin.9    Smokeless tobacco: Never Used   Substance Use Topics    Alcohol use: Yes     Comment: occ      Family History   Problem Relation Age of Onset    Colon Cancer Neg Hx     Colon Polyps Neg Hx     Esophageal Cancer Neg Hx     Liver Cancer Neg Hx     Liver Disease Neg Hx     Stomach Cancer Neg Hx     Rectal Cancer Neg Hx        Review of Systems   Constitutional: Negative for chills, fatigue and fever. HENT: Negative for congestion, ear pain, nosebleeds, postnasal drip and sore throat. Respiratory: Negative for cough, chest tightness and wheezing. Cardiovascular: Negative for chest pain, palpitations and leg swelling. Gastrointestinal: Negative for abdominal pain and constipation. Genitourinary: Negative for dysuria and urgency. Musculoskeletal: Negative. Negative for arthralgias.    Skin: Negative for rash. Neurological: Negative for dizziness and headaches. Psychiatric/Behavioral: Negative. Vitals:    12/07/21 1611   BP: 128/80   Site: Left Upper Arm   Position: Sitting   Cuff Size: Large Adult   Pulse: 80   Resp: 18   SpO2: 100%   Weight: 212 lb (96.2 kg)   Height: 5' 8\" (1.727 m)     Body mass index is 32.23 kg/m². Physical Exam  Constitutional:       Appearance: She is well-developed. HENT:      Right Ear: External ear normal.      Left Ear: External ear normal.      Mouth/Throat:      Pharynx: No oropharyngeal exudate. Eyes:      Conjunctiva/sclera: Conjunctivae normal.      Pupils: Pupils are equal, round, and reactive to light. Neck:      Thyroid: No thyromegaly. Vascular: No JVD. Cardiovascular:      Rate and Rhythm: Normal rate. Heart sounds: Normal heart sounds. No murmur heard. Pulmonary:      Effort: No respiratory distress. Breath sounds: Normal breath sounds. No wheezing or rales. Chest:      Chest wall: No tenderness. Abdominal:      General: Bowel sounds are normal.      Palpations: Abdomen is soft. Musculoskeletal:      Cervical back: Neck supple. Lymphadenopathy:      Cervical: No cervical adenopathy. Skin:     Findings: No rash.          Lab Review   Orders Only on 12/01/2021   Component Date Value    Quantiferon TB Minus NIL 12/01/2021 Negative     Quantiferon TB1 Minus NIL 12/01/2021 0.01     Quantiferon TB2 Minus NIL 12/01/2021 0.01     QuantiFERON Mitogen 12/01/2021 >10.00     QuantiFERON Nil 12/01/2021 0.05    Orders Only on 12/01/2021   Component Date Value    Cholesterol, Total 12/01/2021 203*    Triglycerides 12/01/2021 83     HDL 12/01/2021 65     LDL Calculated 12/01/2021 121     Sodium 12/01/2021 143     Potassium 12/01/2021 3.9     Chloride 12/01/2021 107     CO2 12/01/2021 26     Anion Gap 12/01/2021 10     Glucose 12/01/2021 96     BUN 12/01/2021 17     CREATININE 12/01/2021 0.6     GFR Non- American 12/01/2021 >60     GFR  12/01/2021 >59     Calcium 12/01/2021 9.2     Total Protein 12/01/2021 7.0     Albumin 12/01/2021 4.2     Total Bilirubin 12/01/2021 0.4     Alkaline Phosphatase 12/01/2021 81     ALT 12/01/2021 18     AST 12/01/2021 21     Hemoglobin A1C 12/01/2021 5.5            ASSESSMENT/PLAN:      Generalized anxiety disorder-  RX  #1 cont Pristiq 100 mg daily   #2 cont  BuSpar  15 mg every 12 hours, patient can alternate 15-30 mg depending on her needs     LFT now nl 5/2021  Previously  Spring 2019 -elevated transaminases alt 104 (41) and ast 56( 36))     Mixed hyperlipidemia,   mild elevated LDL- 121 in 12/2021       Essential (primary) hypertension-her blood pressure has been in good range, at this time no prescription is needed, patient will continue to monitor this closely      Obesity- bmi 30.87  Now on diet  IFG a1c 5.5 in 12/2021  Healthy, mostly fiber rich nonstarchy plant-based diet recommended  Recommend to decrease intake of processed foods, simple carbohydrates and animal-based products that high in saturated fats  RX metfromin     Vit D level good range 54 in 5/2021  Cont current rx 2000 iu daily              Orders Placed This Encounter   Procedures    INFLUENZA, MDCK QUADV, 2 YRS AND OLDER, IM, PF, PREFILL SYR OR SDV, 0.5ML (FLUCELVAX QUADV, PF)    Comprehensive Metabolic Panel    CBC Auto Differential    Hemoglobin A1C    Lipid Panel    Urinalysis    TSH without Reflex    Vitamin D 25 Hydroxy     New Prescriptions    No medications on file         No follow-ups on file. There are no Patient Instructions on file for this visit. EMR Dragon/transcription disclaimer:Significant part of this  encounter note is electronic transcription/translationof spoken language to printed text. The electronic translation of spoken language may be erroneous, or at times, nonsensical words or phrases may be inadvertently transcribed.  Although I have reviewed the note for sucherrors, some may still exist.

## 2021-12-08 LAB
BASOPHILS ABSOLUTE: 0.1 K/UL (ref 0–0.2)
BASOPHILS RELATIVE PERCENT: 0.7 % (ref 0–1)
EOSINOPHILS ABSOLUTE: 0.2 K/UL (ref 0–0.6)
EOSINOPHILS RELATIVE PERCENT: 2.1 % (ref 0–5)
HCT VFR BLD CALC: 43 % (ref 37–47)
HEMOGLOBIN: 13.2 G/DL (ref 12–16)
IMMATURE GRANULOCYTES #: 0 K/UL
LYMPHOCYTES ABSOLUTE: 3.4 K/UL (ref 1.1–4.5)
LYMPHOCYTES RELATIVE PERCENT: 33.6 % (ref 20–40)
MCH RBC QN AUTO: 29.4 PG (ref 27–31)
MCHC RBC AUTO-ENTMCNC: 30.7 G/DL (ref 33–37)
MCV RBC AUTO: 95.8 FL (ref 81–99)
MONOCYTES ABSOLUTE: 1.2 K/UL (ref 0–0.9)
MONOCYTES RELATIVE PERCENT: 11.5 % (ref 0–10)
NEUTROPHILS ABSOLUTE: 5.3 K/UL (ref 1.5–7.5)
NEUTROPHILS RELATIVE PERCENT: 51.7 % (ref 50–65)
PDW BLD-RTO: 13.6 % (ref 11.5–14.5)
PLATELET # BLD: 292 K/UL (ref 130–400)
PMV BLD AUTO: 10.8 FL (ref 9.4–12.3)
RBC # BLD: 4.49 M/UL (ref 4.2–5.4)
WBC # BLD: 10.3 K/UL (ref 4.8–10.8)

## 2021-12-17 ENCOUNTER — OFFICE VISIT (OUTPATIENT)
Dept: URGENT CARE | Age: 51
End: 2021-12-17
Payer: COMMERCIAL

## 2021-12-17 VITALS
OXYGEN SATURATION: 98 % | HEART RATE: 86 BPM | WEIGHT: 212 LBS | DIASTOLIC BLOOD PRESSURE: 92 MMHG | HEIGHT: 68 IN | BODY MASS INDEX: 32.13 KG/M2 | TEMPERATURE: 97.1 F | SYSTOLIC BLOOD PRESSURE: 132 MMHG

## 2021-12-17 DIAGNOSIS — R53.83 FATIGUE, UNSPECIFIED TYPE: ICD-10-CM

## 2021-12-17 DIAGNOSIS — R09.81 NASAL CONGESTION: ICD-10-CM

## 2021-12-17 DIAGNOSIS — Z11.59 SCREENING FOR VIRAL DISEASE: ICD-10-CM

## 2021-12-17 DIAGNOSIS — R05.9 COUGH: Primary | ICD-10-CM

## 2021-12-17 LAB
INFLUENZA A ANTIBODY: NORMAL
INFLUENZA B ANTIBODY: NORMAL
SARS-COV-2, PCR: DETECTED

## 2021-12-17 PROCEDURE — 99213 OFFICE O/P EST LOW 20 MIN: CPT | Performed by: NURSE PRACTITIONER

## 2021-12-17 PROCEDURE — 87804 INFLUENZA ASSAY W/OPTIC: CPT | Performed by: NURSE PRACTITIONER

## 2021-12-17 ASSESSMENT — ENCOUNTER SYMPTOMS
SINUS PRESSURE: 1
SORE THROAT: 0
DIARRHEA: 0
SHORTNESS OF BREATH: 0
SINUS COMPLAINT: 1
VOMITING: 0
COUGH: 1
NAUSEA: 0
ABDOMINAL PAIN: 0
ALLERGIC/IMMUNOLOGIC NEGATIVE: 1

## 2021-12-17 ASSESSMENT — VISUAL ACUITY: OU: 1

## 2021-12-17 NOTE — PATIENT INSTRUCTIONS
Plenty of fluids  Rest  OTC Tylenol or Motrin as needed  Stay home and stay in until we call with COVID results- we will call with results tomorrow or you can view in my chart  Follow up with PCP or return to Urgent Care for worsening or unresolved symptoms. Patient Education        Learning About Coronavirus [phone])  What is coronavirus (COVID-19)? COVID-19 is a disease caused by a type of coronavirus. This illness was first found in December 2019. It has since spread worldwide. Coronaviruses are a large group of viruses. They cause the common cold. They also cause more serious illnesses like [place] respiratory syndrome (MERS) and severe acute respiratory syndrome (SARS). COVID-19 is caused by a novel coronavirus. That means it's a new type that has not been seen in people before. What are the symptoms? COVID-19 symptoms may include:  · Fever. · Cough. · Trouble breathing. · Chills or repeated shaking with chills. · Muscle and body aches. · Headache. · Sore throat. · New loss of taste or smell. · Vomiting. · Diarrhea. In severe cases, COVID-19 can cause pneumonia and make it hard to breathe without help from a machine. It can cause death. How is it diagnosed? COVID-19 is diagnosed with a viral test. This may also be called a PCR test or antigen test. It looks for evidence of the virus in your breathing passages or lungs (respiratory system). The test is most often done on a sample from the nose, throat, or lungs. It's sometimes done on a sample of saliva. One way a sample is collected is by putting a long swab into the back of your nose. How is it treated? Mild cases of COVID-19 can be treated at home. Serious cases need treatment in the hospital. Treatment may include medicines to reduce symptoms, plus breathing support such as oxygen therapy or a ventilator. Some people may be placed on their belly to help their oxygen levels.   Treatments that may help people who have COVID-19 include:  Antiviral medicines. These medicines treat viral infections. Remdesivir is an example. Immune-based therapy. These medicines help the immune system fight COVID-19. Examples include monoclonal antibodies. Blood thinners. These medicines help prevent blood clots. People with severe illness are at risk for blood clots. How can you protect yourself and others? The best way to protect yourself from getting sick is to:  · Get vaccinated. · Avoid sick people. · If you are not fully vaccinated:  ? Wear a mask if you have to go to public areas. ? Avoid crowds and try to stay at least 6 feet away from other people. · Cover your mouth with a tissue when you cough or sneeze. · Wash your hands often, especially after you cough or sneeze. Use soap and water, and scrub for at least 20 seconds. If soap and water aren't available, use an alcohol-based hand . · Avoid touching your mouth, nose, and eyes. To help avoid spreading the virus to others:  · Get vaccinated. · Cover your mouth with a tissue when you cough or sneeze. · Wash your hands often, especially after you cough or sneeze. Use soap and water, and scrub for at least 20 seconds. If soap and water aren't available, use an alcohol-based hand . · If you have been exposed to the virus and are not fully vaccinated:  ? Stay home. Don't go to school, work, or public areas. And don't use public transportation, ride-shares, or taxis unless you have no choice. ? Wear a mask if you have to go to public areas, like the pharmacy. · If you're sick:  ? Leave your home only if you need to get medical care. But call the doctor's office first so they know you're coming. And wear a mask. ? Wear a mask whenever you're around other people. ? Limit contact with pets and people in your home. If possible, stay in a separate bedroom and use a separate bathroom. ? Clean and disinfect your home every day.  Use household  and disinfectant wipes or sprays. Take special care to clean things that you touch with your hands. How can you self-isolate when you have COVID-19? If you have COVID-19, there are things you can do to help avoid spreading the virus to others. · Limit contact with people in your home. If possible, stay in a separate bedroom and use a separate bathroom. · Wear a mask when you are around other people. · If you have to leave home, avoid crowds and try to stay at least 6 feet away from other people. · Avoid contact with pets and other animals. · Cover your mouth and nose with a tissue when you cough or sneeze. Then throw it in the trash right away. · Wash your hands often, especially after you cough or sneeze. Use soap and water, and scrub for at least 20 seconds. If soap and water aren't available, use an alcohol-based hand . · Don't share personal household items. These include bedding, towels, cups and glasses, and eating utensils. · [address] in the warmest water allowed for the fabric type, and dry it completely. It's okay to wash other people's laundry with yours. · Clean and disinfect your home. Use household  and disinfectant wipes or sprays. When should you call for help? Call 911 anytime you think you may need emergency care. For example, call if you have life-threatening symptoms, such as:    · You have severe trouble breathing. (You can't talk at all.)     · You have constant chest pain or pressure.     · You are severely dizzy or lightheaded.     · You are confused or can't think clearly.     · You have pale, gray, or blue-colored skin or lips.     · You pass out (lose consciousness) or are very hard to wake up. Call your doctor now or seek immediate medical care if:    · You have moderate trouble breathing. (You can't speak a full sentence.)     · You are coughing up blood (more than about 1 teaspoon).     · You have signs of low blood pressure.  These include feeling lightheaded; being too weak to stand; and having cold, pale, clammy skin. Watch closely for changes in your health, and be sure to contact your doctor if:    · Your symptoms get worse.     · You are not getting better as expected.     · You have new or worse symptoms of anxiety, depression, nightmares, or flashbacks. Call before you go to the doctor's office. Follow their instructions. And wear a mask. Current as of: July 1, 2021               Content Version: 13.0  © 2006-2021 [employer]. Care instructions adapted under license by [facility] ([facility]). If you have questions about a medical condition or this instruction, always ask your healthcare professional. [place] [address] any warranty or liability for your use of this information. Patient Education        Coronavirus ([id number]-80): Care Instructions  Overview  The coronavirus disease (COVID-19) is caused by a virus. Symptoms may include a fever, a cough, and shortness of breath. It can spread through droplets from coughing and sneezing, breathing, and singing. The virus also can spread when people are in close contact with someone who is infected. Most people have mild symptoms and can take care of themselves at home. If their symptoms get worse, they may need care in a hospital. Treatment may include medicines to reduce symptoms, plus breathing support such as oxygen therapy or a ventilator. It's important to not spread the virus to others. If you have COVID-19, wear a mask anytime you are around other people. It can help stop the spread of the virus. You need to isolate yourself while you are sick. Leave your home only if you need to get medical care or testing. Follow-up care is a key part of your treatment and safety. Be sure to make and go to all appointments, and call your doctor if you are having problems. It's also a good idea to know your test results and keep a list of the medicines you take.   How can you care for yourself at home?  · Get extra rest. It can help you feel better. · Drink plenty of fluids. This helps replace fluids lost from fever. Fluids may also help ease a scratchy throat. · You can take acetaminophen (Tylenol) or ibuprofen (Advil, Motrin) to reduce a fever. It may also help with muscle and body aches. Read and follow all instructions on the label. · Use petroleum jelly on sore skin. This can help if the skin around your nose and lips becomes sore from rubbing a lot with tissues. If you use oxygen, use a water-based product instead of petroleum jelly. · Keep track of symptoms such as fever and shortness of breath. This can help you know if you need to call your doctor. It can also help you know when it's safe to be around other people. · In some cases, your doctor might suggest that you get a pulse oximeter. How can you self-isolate when you have COVID-19? If you have COVID-19, there are things you can do to help avoid spreading the virus to others. · Limit contact with people in your home. If possible, stay in a separate bedroom and use a separate bathroom. · Wear a mask when you are around other people. · If you have to leave home, avoid crowds and try to stay at least 6 feet away from other people. · Avoid contact with pets and other animals. · Cover your mouth and nose with a tissue when you cough or sneeze. Then throw it in the trash right away. · Wash your hands often, especially after you cough or sneeze. Use soap and water, and scrub for at least 20 seconds. If soap and water aren't available, use an alcohol-based hand . · Don't share personal household items. These include bedding, towels, cups and glasses, and eating utensils. · [address] in the warmest water allowed for the fabric type, and dry it completely. It's okay to wash other people's laundry with yours. · Clean and disinfect your home. Use household  and disinfectant wipes or sprays.   When can you end self-isolation for COVID-19? If you know or think that you have the virus, you will need to self-isolate. You can be around others after:  · It's been at least 10 days since your symptoms started and  · You haven't had a fever for 24 hours without taking medicines to lower the fever and  · Your symptoms are improving. If you tested positive but have no symptoms, you can end isolation after 10 days. But if you start to have symptoms, follow the steps above. Ask your doctor if you need to be tested before you end isolation. This is especially important if you have a weakened immune system. When should you call for help? Call 911 anytime you think you may need emergency care. For example, call if you have life-threatening symptoms, such as:    · You have severe trouble breathing. (You can't talk at all.)     · You have constant chest pain or pressure.     · You are severely dizzy or lightheaded.     · You are confused or can't think clearly.     · You have pale, gray, or blue-colored skin or lips.     · You pass out (lose consciousness) or are very hard to wake up. Call your doctor now or seek immediate medical care if:    · You have moderate trouble breathing. (You can't speak a full sentence.)     · You are coughing up blood (more than about 1 teaspoon).     · You have signs of low blood pressure. These include feeling lightheaded; being too weak to stand; and having cold, pale, clammy skin. Watch closely for changes in your health, and be sure to contact your doctor if:    · Your symptoms get worse.     · You are not getting better as expected.     · You have new or worse symptoms of anxiety, depression, nightmares, or flashbacks. Call before you go to the doctor's office. Follow their instructions. And wear a mask. Current as of: July 1, 2021               Content Version: 13.0  © 2006-2021 [employer]. Care instructions adapted under license by [facility]  If you have questions about a medical condition or this instruction, always ask your healthcare professional. [place] [address] any warranty or liability for your use of this information.

## 2021-12-17 NOTE — PROGRESS NOTES
[address] [facility] URGENT CARE  [address] [facility] [address] [id number]  Dept: [phone]  Dept Fax: [phone]  Loc: [phone]    Jeffrey Croft is a 46 y.o. female who presents today for her medical conditions/complaintsas noted below. Jeffrey Croft is c/o of Headache, Cough, and Sinus Problem        HPI:     Headache   This is a new problem. The current episode started today. The problem occurs constantly. The problem has been unchanged. The pain is located in the frontal region. The pain does not radiate. The quality of the pain is described as dull. The pain is moderate. Associated symptoms include coughing and sinus pressure. Pertinent negatives include no abdominal pain, anorexia, fever, muscle aches, nausea, sore throat or vomiting. Associated symptoms comments: Fatigue  Nasal congestion. Nothing aggravates the symptoms. Treatments tried: OTC Cold and allergy medication. The treatment provided mild relief. Sinus Problem  This is a new problem. The current episode started yesterday. The problem is unchanged. There has been no fever. The pain is mild. Associated symptoms include congestion, coughing, headaches and sinus pressure. Pertinent negatives include no chills, shortness of breath or sore throat. Past treatments include oral decongestants. The treatment provided mild relief. She began to experience symptoms yesterday but is beginning to have headache today. She ha been vaccinated for COVID. Past Medical History:   Diagnosis Date    Acquired deviated nasal septum     Anal bleeding 10/31/2016    Ankle injury     Asthma     Benign paroxysmal positional vertigo     Derangement of medial meniscus     L knee mild, clinical diagnosis 9/19/12 after turning foot and popping sound 9/16/12 while walking and turn.     Eustachian tube dysfunction     Excessive sleepiness     During the day    Generalized anxiety disorder     H/O varicose veins     History of sprain of foot     Osteoarthritis     Pain in joint of left knee     Palpitations     Psoriasis      Past Surgical History:   Procedure Laterality Date    APPENDECTOMY      BREAST SURGERY      lift and a left implant   Dania Mata    ENDOSCOPY, COLON, DIAGNOSTIC      KNEE ARTHROSCOPY Bilateral     SD COLONOSCOPY FLX DX W/COLLJ SPEC WHEN PFRMD N/A 2016    Dr Larry Rubio    SINUS SURGERY      nasal septoplasty/ maxillary antrostomies   Jason Mata       Family History   Problem Relation Age of Onset    Colon Cancer Neg Hx     Colon Polyps Neg Hx     Esophageal Cancer Neg Hx     Liver Cancer Neg Hx     Liver Disease Neg Hx     Stomach Cancer Neg Hx     Rectal Cancer Neg Hx        Social History     Tobacco Use    Smoking status: Former Smoker     Quit date:      Years since quittin.9    Smokeless tobacco: Never Used   Substance Use Topics    Alcohol use: Yes     Comment: occ      Current Outpatient Medications   Medication Sig Dispense Refill    metFORMIN (GLUCOPHAGE-XR) 500 MG extended release tablet TAKE 2 TABLETS BY MOUTH DAILY WITH SUPPER. 60 tablet 3    busPIRone (BUSPAR) 15 MG tablet TAKE 1 TO 2 TABLETS BY MOUTH TWICE DAILY.  120 tablet 3    desvenlafaxine succinate (PRISTIQ) 100 MG TB24 extended release tablet TAKE 1 TABLET BY MOUTH ONCE DAILY 30 tablet 3    guselkumab (TREMFYA) 100 MG/ML SOSY injection Inject 100 mg into the skin once      fluticasone (FLONASE) 50 MCG/ACT nasal spray 2 sprays by Each Nostril route daily 1 Bottle 0    naproxen (NAPROSYN) 500 MG tablet Take 1 tablet by mouth 2 times daily 20 tablet 0    albuterol sulfate HFA (VENTOLIN HFA) 108 (90 Base) MCG/ACT inhaler Inhale 2 puffs into the lungs every 6 hours as needed for Wheezing (Patient not taking: Reported on 2021) 1 Inhaler 3     No current facility-administered medications for this visit. No Known Allergies    Health Maintenance   Topic Date Due    Cervical cancer screen  08/23/2020    COVID-19 Vaccine (3 - Booster for Moderna series) 11/10/2021    Colon cancer screen colonoscopy  12/19/2021    DTaP/Tdap/Td vaccine (1 - Tdap) 12/28/2021 (Originally 6/12/1989)    Shingles Vaccine (1 of 2) 12/07/2022 (Originally 6/12/2018)    A1C test (Diabetic or Prediabetic)  12/01/2022    Potassium monitoring  12/01/2022    Creatinine monitoring  12/01/2022    Breast cancer screen  07/08/2023    Lipid screen  12/01/2026    Flu vaccine  Completed    Hepatitis C screen  Completed    HIV screen  Completed    Hepatitis A vaccine  Aged Out    Hepatitis B vaccine  Aged Out    Hib vaccine  Aged Out    Meningococcal (ACWY) vaccine  Aged Out    Pneumococcal 0-64 years Vaccine  Aged Out       Subjective:     Review of Systems   Constitutional: Negative for activity change, appetite change, chills and fever. HENT: Positive for congestion and sinus pressure. Negative for ear discharge and sore throat. Nasal stuffiness   Respiratory: Positive for cough. Negative for shortness of breath. Gastrointestinal: Negative for abdominal pain, anorexia, diarrhea, nausea and vomiting. Musculoskeletal: Negative for arthralgias and myalgias. Skin: Negative for rash. Allergic/Immunologic: Negative. Neurological: Positive for headaches.       :Objective      Physical Exam  Vitals and nursing note reviewed. Constitutional:       General: She is awake. She is not in acute distress. Appearance: Normal appearance. She is well-developed, well-groomed and overweight. She is not ill-appearing. HENT:      Head: Normocephalic. Right Ear: Hearing, tympanic membrane, ear canal and external ear normal.      Left Ear: Hearing, tympanic membrane, ear canal and external ear normal.      Nose: Congestion present. Right Sinus: Frontal sinus tenderness present.       Left Sinus: Frontal sinus tenderness present. Mouth/Throat:      Lips: Pink. Mouth: Mucous membranes are moist.      Pharynx: Oropharynx is clear. Uvula midline. Tonsils: 0 on the right. 0 on the left. Eyes:      General: Vision grossly intact. Neck:      Trachea: Phonation normal.   Cardiovascular:      Rate and Rhythm: Normal rate and regular rhythm. Heart sounds: Normal heart sounds, S1 normal and S2 normal. No murmur heard. No friction rub. No gallop. Pulmonary:      Effort: Pulmonary effort is normal. No respiratory distress. Breath sounds: Normal breath sounds and air entry. No wheezing, rhonchi or rales. Abdominal:      Palpations: Abdomen is soft. Musculoskeletal:         General: No tenderness or deformity. Normal range of motion. Cervical back: Full passive range of motion without pain and neck supple. Lymphadenopathy:      Head:      Right side of head: No tonsillar adenopathy. Left side of head: No tonsillar adenopathy. Skin:     General: Skin is warm and dry. Capillary Refill: Capillary refill takes less than 2 seconds. Neurological:      General: No focal deficit present. Mental Status: She is alert, oriented to person, place, and time and easily aroused. Psychiatric:         Attention and Perception: Attention normal.         Mood and Affect: Mood normal.         Speech: Speech normal.         Behavior: Behavior normal. Behavior is cooperative. BP (!) 132/92   Pulse 86   Temp 97.1 °F (36.2 °C) (Temporal)   Ht 5' 8\" (1.727 m)   Wt 212 lb (96.2 kg)   SpO2 98%   BMI 32.23 kg/m²     :Assessment       Diagnosis Orders   1. Cough  POCT Influenza A/B    COVID-19   2. Fatigue, unspecified type  POCT Influenza A/B    COVID-19   3. Nasal congestion  POCT Influenza A/B    COVID-19   4.  Screening for viral disease  COVID-19       :Plan      Orders Placed This Encounter   Procedures    COVID-19     Scheduling Instructions:      1) Due to current limited availability of the COVID-19 test, tests will be prioritized based on responses to questions above. Testing may be delayed due to volume. 2) Print and instruct patient to adhere to CDC home isolation program. (Link Above)              3) Set up or refer patient for a monitoring program.              4) Have patient sign up for and leverage MyChart (if not previously done). Order Specific Question:   Is this test for diagnosis or screening? Answer:   Diagnosis of ill patient     Order Specific Question:   Symptomatic for COVID-19 as defined by CDC? Answer:   Yes     Order Specific Question:   Date of Symptom Onset     Answer:   12/16/2021     Order Specific Question:   Hospitalized for COVID-19? Answer:   No     Order Specific Question:   Admitted to ICU for COVID-19? Answer:   No     Order Specific Question:   Employed in healthcare setting? Answer:   No     Order Specific Question:   Resident in a congregate (group) care setting? Answer:   No     Order Specific Question:   Pregnant? Answer:   No     Order Specific Question:   Previously tested for COVID-19? Answer:   No    POCT Influenza A/B     Results for orders placed or performed in visit on 12/17/21   POCT Influenza A/B   Result Value Ref Range    Influenza A Ab neg     Influenza B Ab neg        No follow-ups on file. No orders of the defined types were placed in this encounter. Since pt is being tested for COVID pt has been instructed to quarantine from contacts until testing has been resulted. Further instructions will follow, as of now, this is 14 days unless otherwise specified when results are back. If SOB or worsening sx's develop, need to go to ED or return to clinic, pt voiced understanding.      Pt was given printed instructions today on Possible COVID-19 infection with self-quarantine and management of symptoms    Call or return to clinic prn if these symptoms worsen or fail to improve as anticipated. Patient Instructions     Plenty of fluids  Rest  OTC Tylenol or Motrin as needed  Stay home and stay in until we call with COVID results- we will call with results tomorrow or you can view in my chart  Follow up with PCP or return to Urgent Care for worsening or unresolved symptoms. Patient Education        Learning About Coronavirus [phone])  What is coronavirus (COVID-19)? COVID-19 is a disease caused by a type of coronavirus. This illness was first found in December 2019. It has since spread worldwide. Coronaviruses are a large group of viruses. They cause the common cold. They also cause more serious illnesses like [place] respiratory syndrome (MERS) and severe acute respiratory syndrome (SARS). COVID-19 is caused by a novel coronavirus. That means it's a new type that has not been seen in people before. What are the symptoms? COVID-19 symptoms may include:  · Fever. · Cough. · Trouble breathing. · Chills or repeated shaking with chills. · Muscle and body aches. · Headache. · Sore throat. · New loss of taste or smell. · Vomiting. · Diarrhea. In severe cases, COVID-19 can cause pneumonia and make it hard to breathe without help from a machine. It can cause death. How is it diagnosed? COVID-19 is diagnosed with a viral test. This may also be called a PCR test or antigen test. It looks for evidence of the virus in your breathing passages or lungs (respiratory system). The test is most often done on a sample from the nose, throat, or lungs. It's sometimes done on a sample of saliva. One way a sample is collected is by putting a long swab into the back of your nose. How is it treated? Mild cases of COVID-19 can be treated at home. Serious cases need treatment in the hospital. Treatment may include medicines to reduce symptoms, plus breathing support such as oxygen therapy or a ventilator.  Some people may be placed on their belly to help their oxygen levels. Treatments that may help people who have COVID-19 include:  Antiviral medicines. These medicines treat viral infections. Remdesivir is an example. Immune-based therapy. These medicines help the immune system fight COVID-19. Examples include monoclonal antibodies. Blood thinners. These medicines help prevent blood clots. People with severe illness are at risk for blood clots. How can you protect yourself and others? The best way to protect yourself from getting sick is to:  · Get vaccinated. · Avoid sick people. · If you are not fully vaccinated:  ? Wear a mask if you have to go to public areas. ? Avoid crowds and try to stay at least 6 feet away from other people. · Cover your mouth with a tissue when you cough or sneeze. · Wash your hands often, especially after you cough or sneeze. Use soap and water, and scrub for at least 20 seconds. If soap and water aren't available, use an alcohol-based hand . · Avoid touching your mouth, nose, and eyes. To help avoid spreading the virus to others:  · Get vaccinated. · Cover your mouth with a tissue when you cough or sneeze. · Wash your hands often, especially after you cough or sneeze. Use soap and water, and scrub for at least 20 seconds. If soap and water aren't available, use an alcohol-based hand . · If you have been exposed to the virus and are not fully vaccinated:  ? Stay home. Don't go to school, work, or public areas. And don't use public transportation, ride-shares, or taxis unless you have no choice. ? Wear a mask if you have to go to public areas, like the pharmacy. · If you're sick:  ? Leave your home only if you need to get medical care. But call the doctor's office first so they know you're coming. And wear a mask. ? Wear a mask whenever you're around other people. ? Limit contact with pets and people in your home. If possible, stay in a separate bedroom and use a separate bathroom. ?  Clean and disinfect your home every day. Use household  and disinfectant wipes or sprays. Take special care to clean things that you touch with your hands. How can you self-isolate when you have COVID-19? If you have COVID-19, there are things you can do to help avoid spreading the virus to others. · Limit contact with people in your home. If possible, stay in a separate bedroom and use a separate bathroom. · Wear a mask when you are around other people. · If you have to leave home, avoid crowds and try to stay at least 6 feet away from other people. · Avoid contact with pets and other animals. · Cover your mouth and nose with a tissue when you cough or sneeze. Then throw it in the trash right away. · Wash your hands often, especially after you cough or sneeze. Use soap and water, and scrub for at least 20 seconds. If soap and water aren't available, use an alcohol-based hand . · Don't share personal household items. These include bedding, towels, cups and glasses, and eating utensils. · [address] in the warmest water allowed for the fabric type, and dry it completely. It's okay to wash other people's laundry with yours. · Clean and disinfect your home. Use household  and disinfectant wipes or sprays. When should you call for help? Call 911 anytime you think you may need emergency care. For example, call if you have life-threatening symptoms, such as:    · You have severe trouble breathing. (You can't talk at all.)     · You have constant chest pain or pressure.     · You are severely dizzy or lightheaded.     · You are confused or can't think clearly.     · You have pale, gray, or blue-colored skin or lips.     · You pass out (lose consciousness) or are very hard to wake up. Call your doctor now or seek immediate medical care if:    · You have moderate trouble breathing.  (You can't speak a full sentence.)     · You are coughing up blood (more than about 1 teaspoon).     · You have signs of low blood pressure. These include feeling lightheaded; being too weak to stand; and having cold, pale, clammy skin. Watch closely for changes in your health, and be sure to contact your doctor if:    · Your symptoms get worse.     · You are not getting better as expected.     · You have new or worse symptoms of anxiety, depression, nightmares, or flashbacks. Call before you go to the doctor's office. Follow their instructions. And wear a mask. Current as of: July 1, 2021               Content Version: 13.0  © 2006-2021 [employer]. Care instructions adapted under license by [facility] ([facility]). If you have questions about a medical condition or this instruction, always ask your healthcare professional. [place] [address] any warranty or liability for your use of this information. Patient Education        Coronavirus ([id number]): Care Instructions  Overview  The coronavirus disease (COVID-19) is caused by a virus. Symptoms may include a fever, a cough, and shortness of breath. It can spread through droplets from coughing and sneezing, breathing, and singing. The virus also can spread when people are in close contact with someone who is infected. Most people have mild symptoms and can take care of themselves at home. If their symptoms get worse, they may need care in a hospital. Treatment may include medicines to reduce symptoms, plus breathing support such as oxygen therapy or a ventilator. It's important to not spread the virus to others. If you have COVID-19, wear a mask anytime you are around other people. It can help stop the spread of the virus. You need to isolate yourself while you are sick. Leave your home only if you need to get medical care or testing. Follow-up care is a key part of your treatment and safety. Be sure to make and go to all appointments, and call your doctor if you are having problems.  It's also a good idea to know your test results and keep a list of the medicines you take. How can you care for yourself at home? · Get extra rest. It can help you feel better. · Drink plenty of fluids. This helps replace fluids lost from fever. Fluids may also help ease a scratchy throat. · You can take acetaminophen (Tylenol) or ibuprofen (Advil, Motrin) to reduce a fever. It may also help with muscle and body aches. Read and follow all instructions on the label. · Use petroleum jelly on sore skin. This can help if the skin around your nose and lips becomes sore from rubbing a lot with tissues. If you use oxygen, use a water-based product instead of petroleum jelly. · Keep track of symptoms such as fever and shortness of breath. This can help you know if you need to call your doctor. It can also help you know when it's safe to be around other people. · In some cases, your doctor might suggest that you get a pulse oximeter. How can you self-isolate when you have COVID-19? If you have COVID-19, there are things you can do to help avoid spreading the virus to others. · Limit contact with people in your home. If possible, stay in a separate bedroom and use a separate bathroom. · Wear a mask when you are around other people. · If you have to leave home, avoid crowds and try to stay at least 6 feet away from other people. · Avoid contact with pets and other animals. · Cover your mouth and nose with a tissue when you cough or sneeze. Then throw it in the trash right away. · Wash your hands often, especially after you cough or sneeze. Use soap and water, and scrub for at least 20 seconds. If soap and water aren't available, use an alcohol-based hand . · Don't share personal household items. These include bedding, towels, cups and glasses, and eating utensils. · [address] in the warmest water allowed for the fabric type, and dry it completely. It's okay to wash other people's laundry with yours. · Clean and disinfect your home.  Use household  and disinfectant wipes or sprays. When can you end self-isolation for COVID-19? If you know or think that you have the virus, you will need to self-isolate. You can be around others after:  · It's been at least 10 days since your symptoms started and  · You haven't had a fever for 24 hours without taking medicines to lower the fever and  · Your symptoms are improving. If you tested positive but have no symptoms, you can end isolation after 10 days. But if you start to have symptoms, follow the steps above. Ask your doctor if you need to be tested before you end isolation. This is especially important if you have a weakened immune system. When should you call for help? Call 911 anytime you think you may need emergency care. For example, call if you have life-threatening symptoms, such as:    · You have severe trouble breathing. (You can't talk at all.)     · You have constant chest pain or pressure.     · You are severely dizzy or lightheaded.     · You are confused or can't think clearly.     · You have pale, gray, or blue-colored skin or lips.     · You pass out (lose consciousness) or are very hard to wake up. Call your doctor now or seek immediate medical care if:    · You have moderate trouble breathing. (You can't speak a full sentence.)     · You are coughing up blood (more than about 1 teaspoon).     · You have signs of low blood pressure. These include feeling lightheaded; being too weak to stand; and having cold, pale, clammy skin. Watch closely for changes in your health, and be sure to contact your doctor if:    · Your symptoms get worse.     · You are not getting better as expected.     · You have new or worse symptoms of anxiety, depression, nightmares, or flashbacks. Call before you go to the doctor's office. Follow their instructions. And wear a mask. Current as of: July 1, 2021               Content Version: 13.0  © 7884-5097 [employer].    Care instructions adapted under license by [facility] ([facility]). If you have questions about a medical condition or this instruction, always ask your healthcare professional. [place] [address] any warranty or liability for your use of this information. Patient given educational materials- see patient instructions. Discussed use, benefit, and side effects of prescribedmedications. All patient questions answered. Pt voiced understanding.        Electronically signed by MICHAEL López CNP on 12/17/2021 at 1:20 PM

## 2022-01-04 RX ORDER — DESVENLAFAXINE 100 MG/1
TABLET, EXTENDED RELEASE ORAL
Qty: 90 TABLET | Refills: 1 | Status: SHIPPED | OUTPATIENT
Start: 2022-01-04 | End: 2022-05-26 | Stop reason: SDUPTHER

## 2022-01-04 NOTE — TELEPHONE ENCOUNTER
Rosalba called requesting a refill of the below medication which has been pended for you:     Requested Prescriptions     Pending Prescriptions Disp Refills    desvenlafaxine succinate (PRISTIQ) 100 MG TB24 extended release tablet 90 tablet 1     Sig: TAKE 1 TABLET BY MOUTH ONCE DAILY       Last Appointment Date: 12/7/2021  Next Appointment Date: 6/7/2022    No Known Allergies

## 2022-02-28 ENCOUNTER — OFFICE VISIT (OUTPATIENT)
Dept: INTERNAL MEDICINE | Age: 52
End: 2022-02-28
Payer: COMMERCIAL

## 2022-02-28 ENCOUNTER — PATIENT MESSAGE (OUTPATIENT)
Dept: INTERNAL MEDICINE | Age: 52
End: 2022-02-28

## 2022-02-28 VITALS
BODY MASS INDEX: 32.89 KG/M2 | HEIGHT: 68 IN | WEIGHT: 217 LBS | DIASTOLIC BLOOD PRESSURE: 78 MMHG | RESPIRATION RATE: 18 BRPM | SYSTOLIC BLOOD PRESSURE: 112 MMHG | OXYGEN SATURATION: 97 % | HEART RATE: 111 BPM

## 2022-02-28 DIAGNOSIS — L30.9 VULVAR DERMATITIS: Primary | ICD-10-CM

## 2022-02-28 DIAGNOSIS — I10 ESSENTIAL (PRIMARY) HYPERTENSION: ICD-10-CM

## 2022-02-28 DIAGNOSIS — L29.2 VULVAR ITCHING: ICD-10-CM

## 2022-02-28 DIAGNOSIS — E66.09 EXOGENOUS OBESITY: ICD-10-CM

## 2022-02-28 DIAGNOSIS — K13.0 CHEILITIS: ICD-10-CM

## 2022-02-28 PROCEDURE — 99214 OFFICE O/P EST MOD 30 MIN: CPT | Performed by: INTERNAL MEDICINE

## 2022-02-28 RX ORDER — CLOBETASOL PROPIONATE 0.5 MG/G
OINTMENT TOPICAL DAILY
Qty: 30 G | Refills: 0 | Status: SHIPPED | OUTPATIENT
Start: 2022-02-28

## 2022-02-28 ASSESSMENT — PATIENT HEALTH QUESTIONNAIRE - PHQ9
1. LITTLE INTEREST OR PLEASURE IN DOING THINGS: 0
SUM OF ALL RESPONSES TO PHQ QUESTIONS 1-9: 0
SUM OF ALL RESPONSES TO PHQ9 QUESTIONS 1 & 2: 0
SUM OF ALL RESPONSES TO PHQ QUESTIONS 1-9: 0
2. FEELING DOWN, DEPRESSED OR HOPELESS: 0
SUM OF ALL RESPONSES TO PHQ QUESTIONS 1-9: 0
SUM OF ALL RESPONSES TO PHQ QUESTIONS 1-9: 0

## 2022-02-28 ASSESSMENT — ENCOUNTER SYMPTOMS
COUGH: 0
SORE THROAT: 0
ABDOMINAL PAIN: 0
CONSTIPATION: 0
WHEEZING: 0
CHEST TIGHTNESS: 0

## 2022-02-28 NOTE — PROGRESS NOTES
Chief Complaint   Patient presents with    Vaginitis     Vaginal itching on going for about 1 week     History of presenting illness:  Garrett Melendez is a49 y.o. female who presents today for follow up on her chronic medical conditions as noted below. 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    Generalized anxiety disorder 10/22/2017    IFG (impaired fasting glucose) 10/22/2017    Psoriasis 10/22/2017    IBS (irritable bowel syndrome) 10/22/2017    History of bone density study      Overview Note:     4/2018 normal      Abnormal liver function 08/15/2017    Foot fracture, left 08/15/2017    Foot pain 08/15/2017    Altered bowel function 10/31/2016     Overview Note:     Updating Deprecated Diagnoses      Elevated blood pressure 10/15/2016    Essential (primary) hypertension 10/15/2016     Past Medical History:   Diagnosis Date    Acquired deviated nasal septum     Anal bleeding 10/31/2016    Ankle injury     Asthma     Benign paroxysmal positional vertigo     Derangement of medial meniscus     L knee mild, clinical diagnosis 9/19/12 after turning foot and popping sound 9/16/12 while walking and turn.     Eustachian tube dysfunction     Excessive sleepiness     During the day    Generalized anxiety disorder     H/O varicose veins     History of sprain of foot     Osteoarthritis     Pain in joint of left knee     Palpitations     Psoriasis       Past Surgical History:   Procedure Laterality Date    APPENDECTOMY      BREAST SURGERY      lift and a left implant   [facility]    Dr. Pramod Mcconnell    ENDOSCOPY, COLON, DIAGNOSTIC      KNEE ARTHROSCOPY Bilateral     [place] COLONOSCOPY FLX DX W/COLLJ SPEC WHEN PFRMD N/A 12/19/2016    Dr Kirby Median    SINUS SURGERY      nasal septoplasty/ maxillary Madelyn Powell [place], [place]     Current Outpatient Medications   Medication Sig Dispense Refill    clobetasol (TEMOVATE) 0.05 % ointment Apply topically daily Apply topically daily. 30 g 0    desvenlafaxine succinate (PRISTIQ) 100 MG TB24 extended release tablet TAKE 1 TABLET BY MOUTH ONCE DAILY 90 tablet 1    metFORMIN (GLUCOPHAGE-XR) 500 MG extended release tablet TAKE 2 TABLETS BY MOUTH DAILY WITH SUPPER. 60 tablet 3    busPIRone (BUSPAR) 15 MG tablet TAKE 1 TO 2 TABLETS BY MOUTH TWICE DAILY. 120 tablet 3    guselkumab (TREMFYA) 100 MG/ML SOSY injection Inject 100 mg into the skin once      fluticasone (FLONASE) 50 MCG/ACT nasal spray 2 sprays by Each Nostril route daily 1 Bottle 0    albuterol sulfate HFA (VENTOLIN HFA) 108 (90 Base) MCG/ACT inhaler Inhale 2 puffs into the lungs every 6 hours as needed for Wheezing 1 Inhaler 3    naproxen (NAPROSYN) 500 MG tablet Take 1 tablet by mouth 2 times daily 20 tablet 0     No current facility-administered medications for this visit. No Known Allergies  Social History     Tobacco Use    Smoking status: Former Smoker     Packs/day: 1.00     Years: 20.00     Pack years: 20.00     Start date:      Quit date:      Years since quittin.    Smokeless tobacco: Never Used   Substance Use Topics    Alcohol use: Yes     Comment: occ      Family History   Problem Relation Age of Onset    Colon Cancer Neg Hx     Colon Polyps Neg Hx     Esophageal Cancer Neg Hx     Liver Cancer Neg Hx     Liver Disease Neg Hx     Stomach Cancer Neg Hx     Rectal Cancer Neg Hx        Review of Systems   Constitutional: Negative for chills, fatigue and fever. HENT: Negative for congestion, ear pain, nosebleeds, postnasal drip and sore throat. Respiratory: Negative for cough, chest tightness and wheezing. Cardiovascular: Negative for chest pain, palpitations and leg swelling. Gastrointestinal: Negative for abdominal pain and constipation. Genitourinary: Negative for dysuria and urgency. Musculoskeletal: Negative. Negative for arthralgias. Skin: Negative for rash. Neurological: Negative for dizziness and headaches. Psychiatric/Behavioral: Negative. Vitals:    02/28/22 0745   BP: 112/78   Site: Left Upper Arm   Position: Sitting   Cuff Size: Large Adult   Pulse: 111   Resp: 18   SpO2: 97%   Weight: 217 lb (98.4 kg)   Height: 5' 8\" (1.727 m)     Body mass index is 32.99 kg/m². Physical Exam  Constitutional:       Appearance: She is well-developed. HENT:      Right Ear: External ear normal.      Left Ear: External ear normal.      Mouth/Throat:      Pharynx: No oropharyngeal exudate. Eyes:      Conjunctiva/sclera: Conjunctivae normal.      Pupils: Pupils are equal, round, and reactive to light. Neck:      Thyroid: No thyromegaly. Vascular: No JVD. Cardiovascular:      Rate and Rhythm: Normal rate. Heart sounds: Normal heart sounds. No murmur heard. Pulmonary:      Effort: No respiratory distress. Breath sounds: Normal breath sounds. No wheezing or rales. Chest:      Chest wall: No tenderness. Abdominal:      General: Bowel sounds are normal.      Palpations: Abdomen is soft. Genitourinary:     Comments: Skin discoloration/scaly skin around vulvar area buttocks tends to perineum towards the perirectal area  Musculoskeletal:      Cervical back: Neck supple. Lymphadenopathy:      Cervical: No cervical adenopathy. Skin:     Findings: No rash. Neurological:      Mental Status: She is oriented to person, place, and time. Lab Review   No visits with results within 2 Month(s) from this visit.    Latest known visit with results is:   Office Visit on 12/17/2021   Component Date Value    Influenza A Ab 12/17/2021 neg     Influenza B Ab 12/17/2021 neg     SARS-CoV-2, PCR 12/17/2021 DETECTED*           ASSESSMENT/PLAN:    Vulvar dermatitis  Vulvar itching  ? Early lichen sclerosis  Patient also known history of psoriasis  Rx topical clobetasol 0.05% ointment  If not better, patient will need to discuss this and have evaluation by her dermatolgist dr Tala Dai (primary) hypertension-her blood pressure has been in good range, at this time no prescription is needed, patient will continue to monitor this closely      Obesity- bmi 30.87  Now on diet  IFG a1c 5.6 in 11/2020 (5.7 (5.4)  rx phentermin  Diet and weight loss DW in length with patient. I have advised patient to follow strict lower carbohydrate dietary regimen and engage in  exercise routine for 30-45 minutes at least 3-4  Days per week. Patient was provided with strategies how to  shift from personal maladaptive eating patterns toward healthful eating and exercise. . Behavioral therapy components of self monitoring, goal setting, stimulus control and social support were emphasized. Along with above, I am prescribing this patient  Phentermin ( Timmy Hinds BMI is above 30)  While staying on this short term Phentermin treatment regimen, the goal is to loose at least 1 lbs  of weight per week. 2 month follow up appointment is recommended.      Cheilitis  Still not resolved  Recommend all natural products like products from pharmacy to restore  Change toothpaste all-natural  Use topical treatment like Drema Curie for lips      No orders of the defined types were placed in this encounter. New Prescriptions    CLOBETASOL (TEMOVATE) 0.05 % OINTMENT    Apply topically daily Apply topically daily. No follow-ups on file. There are no Patient Instructions on file for this visit. EMR Dragon/transcription disclaimer:Significant part of this  encounter note is electronic transcription/translationof spoken language to printed text. The electronic translation of spoken language may be erroneous, or at times, nonsensical words or phrases may be inadvertently transcribed.

## 2022-03-01 RX ORDER — PHENTERMINE HYDROCHLORIDE 37.5 MG/1
37.5 TABLET ORAL
Qty: 30 TABLET | Refills: 1 | Status: SHIPPED | OUTPATIENT
Start: 2022-03-01 | End: 2022-06-09 | Stop reason: SDUPTHER

## 2022-03-01 NOTE — TELEPHONE ENCOUNTER
Last Appointment Date: 2/28/2022  Next Appointment Date: 6/7/2022    No Known Allergies    Patient needs refill on   Requested Prescriptions     Pending Prescriptions Disp Refills    phentermine (ADIPEX-P) 37.5 MG tablet 30 tablet 1     Sig: Take 1 tablet by mouth every morning (before breakfast) for 30 days.      (last filled this in June 2021)

## 2022-03-01 NOTE — TELEPHONE ENCOUNTER
From: Audelia Parmar  To: Dr. Vahe Presley  Sent: 2/28/2022 8:01 PM CST  Subject: Phentermine    Hello. I did not receive my prescription for Phentermine. Would it be possible to call it in?   Thank you  Rosalba

## 2022-05-12 ENCOUNTER — OFFICE VISIT (OUTPATIENT)
Dept: INTERNAL MEDICINE | Age: 52
End: 2022-05-12
Payer: COMMERCIAL

## 2022-05-12 VITALS
OXYGEN SATURATION: 98 % | WEIGHT: 204 LBS | HEART RATE: 94 BPM | DIASTOLIC BLOOD PRESSURE: 78 MMHG | HEIGHT: 68 IN | BODY MASS INDEX: 30.92 KG/M2 | SYSTOLIC BLOOD PRESSURE: 118 MMHG

## 2022-05-12 DIAGNOSIS — R45.86 MOOD SWINGS: Primary | ICD-10-CM

## 2022-05-12 DIAGNOSIS — F39 MOOD DISORDER (HCC): ICD-10-CM

## 2022-05-12 DIAGNOSIS — F41.1 GENERALIZED ANXIETY DISORDER: ICD-10-CM

## 2022-05-12 PROCEDURE — 99213 OFFICE O/P EST LOW 20 MIN: CPT | Performed by: INTERNAL MEDICINE

## 2022-05-12 RX ORDER — DESVENLAFAXINE 25 MG/1
25 TABLET, EXTENDED RELEASE ORAL DAILY
Qty: 20 TABLET | Refills: 0 | Status: SHIPPED | OUTPATIENT
Start: 2022-05-12 | End: 2022-05-26 | Stop reason: ALTCHOICE

## 2022-05-12 RX ORDER — LAMOTRIGINE 25 MG/1
25 TABLET ORAL 2 TIMES DAILY
Qty: 60 TABLET | Refills: 0 | Status: SHIPPED | OUTPATIENT
Start: 2022-05-12 | End: 2022-05-26 | Stop reason: ALTCHOICE

## 2022-05-12 ASSESSMENT — ENCOUNTER SYMPTOMS
COUGH: 0
CONSTIPATION: 0
CHEST TIGHTNESS: 0
WHEEZING: 0
SORE THROAT: 0
ABDOMINAL PAIN: 0

## 2022-05-12 NOTE — PROGRESS NOTES
Chief Complaint   Patient presents with    Discuss Medications     wants to try lamictal     History of presenting illness:  Cynthia Cruz is a49 y.o. female who presents today for follow up on her chronic medical conditions as noted below. 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    Generalized anxiety disorder 10/22/2017    IFG (impaired fasting glucose) 10/22/2017    Psoriasis 10/22/2017    IBS (irritable bowel syndrome) 10/22/2017    History of bone density study      Overview Note:     4/2018 normal      Abnormal liver function 08/15/2017    Foot fracture, left 08/15/2017    Foot pain 08/15/2017    Altered bowel function 10/31/2016     Overview Note:     Updating Deprecated Diagnoses      Elevated blood pressure 10/15/2016    Essential (primary) hypertension 10/15/2016     Past Medical History:   Diagnosis Date    Acquired deviated nasal septum     Anal bleeding 10/31/2016    Ankle injury     Asthma     Benign paroxysmal positional vertigo     Derangement of medial meniscus     L knee mild, clinical diagnosis 9/19/12 after turning foot and popping sound 9/16/12 while walking and turn.     Eustachian tube dysfunction     Excessive sleepiness     During the day    Generalized anxiety disorder     H/O varicose veins     History of sprain of foot     Osteoarthritis     Pain in joint of left knee     Palpitations     Psoriasis       Past Surgical History:   Procedure Laterality Date    APPENDECTOMY      BREAST SURGERY      lift and a left implant   Mike Thakkar    ENDOSCOPY, COLON, DIAGNOSTIC      KNEE ARTHROSCOPY Bilateral     [place] COLONOSCOPY FLX DX W/COLLJ SPEC WHEN PFRMD N/A 12/19/2016    Dr Flora Gauthier    SINUS SURGERY      nasal septoplasty/ maxillary antrostomies    [place], [place]     Current Outpatient Medications   Medication Sig Dispense Refill    desvenlafaxine succinate (PRISTIQ) 25 MG TB24 extended release tablet Take 1 tablet by mouth daily 20 tablet 0    lamoTRIgine (LAMICTAL) 25 MG tablet Take 1 tablet by mouth 2 times daily 60 tablet 0    clobetasol (TEMOVATE) 0.05 % ointment Apply topically daily Apply topically daily. 30 g 0    desvenlafaxine succinate (PRISTIQ) 100 MG TB24 extended release tablet TAKE 1 TABLET BY MOUTH ONCE DAILY 90 tablet 1    metFORMIN (GLUCOPHAGE-XR) 500 MG extended release tablet TAKE 2 TABLETS BY MOUTH DAILY WITH SUPPER. 60 tablet 3    busPIRone (BUSPAR) 15 MG tablet TAKE 1 TO 2 TABLETS BY MOUTH TWICE DAILY. 120 tablet 3    guselkumab (TREMFYA) 100 MG/ML SOSY injection Inject 100 mg into the skin once      fluticasone (FLONASE) 50 MCG/ACT nasal spray 2 sprays by Each Nostril route daily 1 Bottle 0    albuterol sulfate HFA (VENTOLIN HFA) 108 (90 Base) MCG/ACT inhaler Inhale 2 puffs into the lungs every 6 hours as needed for Wheezing 1 Inhaler 3    naproxen (NAPROSYN) 500 MG tablet Take 1 tablet by mouth 2 times daily 20 tablet 0     No current facility-administered medications for this visit. No Known Allergies  Social History     Tobacco Use    Smoking status: Former Smoker     Packs/day: 1.00     Years: 20.00     Pack years: 20.00     Start date:      Quit date:      Years since quittin.3    Smokeless tobacco: Never Used   Substance Use Topics    Alcohol use: Yes     Comment: occ      Family History   Problem Relation Age of Onset    Colon Cancer Neg Hx     Colon Polyps Neg Hx     Esophageal Cancer Neg Hx     Liver Cancer Neg Hx     Liver Disease Neg Hx     Stomach Cancer Neg Hx     Rectal Cancer Neg Hx        Review of Systems   Constitutional: Positive for fatigue. Negative for chills and fever.    HENT: Negative for congestion, ear pain, nosebleeds, postnasal drip and sore throat. Respiratory: Negative for cough, chest tightness and wheezing. Cardiovascular: Negative for chest pain, palpitations and leg swelling. Gastrointestinal: Negative for abdominal pain and constipation. Genitourinary: Negative for dysuria and urgency. Musculoskeletal: Negative. Negative for arthralgias. Skin: Negative for rash. Neurological: Negative for dizziness and headaches. Psychiatric/Behavioral: Positive for decreased concentration and sleep disturbance. The patient is nervous/anxious. Vitals:    05/12/22 1001   BP: 118/78   Pulse: 94   SpO2: 98%   Weight: 204 lb (92.5 kg)   Height: 5' 8\" (1.727 m)     Body mass index is 31.02 kg/m². Physical Exam  Constitutional:       Appearance: She is well-developed. HENT:      Right Ear: External ear normal.      Left Ear: External ear normal.      Mouth/Throat:      Pharynx: No oropharyngeal exudate. Eyes:      Conjunctiva/sclera: Conjunctivae normal.      Pupils: Pupils are equal, round, and reactive to light. Neck:      Thyroid: No thyromegaly. Vascular: No JVD. Cardiovascular:      Rate and Rhythm: Normal rate. Heart sounds: Normal heart sounds. No murmur heard. Pulmonary:      Effort: No respiratory distress. Breath sounds: Normal breath sounds. No wheezing or rales. Chest:      Chest wall: No tenderness. Abdominal:      General: Bowel sounds are normal.      Palpations: Abdomen is soft. Musculoskeletal:      Cervical back: Neck supple. Lymphadenopathy:      Cervical: No cervical adenopathy. Skin:     Findings: No rash. Neurological:      Mental Status: She is oriented to person, place, and time. Lab Review   No visits with results within 2 Month(s) from this visit.    Latest known visit with results is:   Office Visit on 12/17/2021   Component Date Value    Influenza A Ab 12/17/2021 neg     Influenza B Ab 12/17/2021 neg     SARS-CoV-2, PCR 12/17/2021 DETECTED*           ASSESSMENT/PLAN:    Mood swings    Mood disorder (HCC)    Generalized anxiety disorder    Patient states her family members taking Lamictal with good results and she would like to try Lamictal instead of Pristiq  Discussed all possible side effects of Lamictal can cause  Discussed that she needs to be off Pristiq before starting Lamictal  Following is a recommendation  1. Pristiq 100 mg take half tablet daily x7 days  Subsequently take Pristiq 25 mg daily x7 days and then discontinue  After discontinuation of Pristiq initiate Lamictal  Week 1 to 2- 25 mg every other day  Week 3 to 4- 25 mg daily  Patient then will have appointment for follow-up here mid June 2022 for further recommendations    -     desvenlafaxine succinate (PRISTIQ) 25 MG TB24 extended release tablet; Take 1 tablet by mouth daily  -     lamoTRIgine (LAMICTAL) 25 MG tablet; Take 1 tablet by mouth 2 times daily    She has any complaints or problems she will need to notify us immediately        No orders of the defined types were placed in this encounter. New Prescriptions    DESVENLAFAXINE SUCCINATE (PRISTIQ) 25 MG TB24 EXTENDED RELEASE TABLET    Take 1 tablet by mouth daily    LAMOTRIGINE (LAMICTAL) 25 MG TABLET    Take 1 tablet by mouth 2 times daily         No follow-ups on file. There are no Patient Instructions on file for this visit. EMR Dragon/transcription disclaimer:Significant part of this  encounter note is electronic transcription/translationof spoken language to printed text. The electronic translation of spoken language may be erroneous, or at times, nonsensical words or phrases may be inadvertently transcribed.  Although I have reviewed the note for sucherrors, some may still exist.

## 2022-05-25 ENCOUNTER — PATIENT MESSAGE (OUTPATIENT)
Dept: INTERNAL MEDICINE | Age: 52
End: 2022-05-25

## 2022-05-26 RX ORDER — DESVENLAFAXINE 100 MG/1
TABLET, EXTENDED RELEASE ORAL
Qty: 90 TABLET | Refills: 1 | Status: SHIPPED | OUTPATIENT
Start: 2022-05-26 | End: 2022-06-17 | Stop reason: SDUPTHER

## 2022-05-26 NOTE — TELEPHONE ENCOUNTER
From: Chidi Sanchez  To: Dr. Justice Paiz  Sent: 5/25/2022 7:22 PM CDT  Subject: Lamictal    Dr Ainsley Reyes   I do not like Lamictal. I want to go back off of them and back on my prestige. I have only taken 5 so I am not taking anymore.    Thank you   Rosalba

## 2022-05-26 NOTE — TELEPHONE ENCOUNTER
She can restart Pristiq  She can initially take 50 mg for 1 week to 2 weeks and then increase to 200 mg which she was taking before  Please send in Rx what she is needing

## 2022-06-06 ENCOUNTER — PATIENT MESSAGE (OUTPATIENT)
Dept: INTERNAL MEDICINE | Age: 52
End: 2022-06-06

## 2022-06-06 DIAGNOSIS — E66.09 EXOGENOUS OBESITY: ICD-10-CM

## 2022-06-06 NOTE — TELEPHONE ENCOUNTER
From: Opal Mckeon  To: Dr. Mike Puckett: 6/6/2022 4:08 PM CDT  Subject: Phentermine    May I go back on Phentermine since I went off of the Lamictal?   Thank you   Rosalba

## 2022-06-09 RX ORDER — PHENTERMINE HYDROCHLORIDE 37.5 MG/1
37.5 TABLET ORAL
Qty: 30 TABLET | Refills: 1 | Status: SHIPPED | OUTPATIENT
Start: 2022-06-09 | End: 2022-07-09

## 2022-06-09 NOTE — TELEPHONE ENCOUNTER
Last Appointment Date: 5/12/2022  Next Appointment Date: 6/21/2022    No Known Allergies    Patient needs refill on   Requested Prescriptions     Pending Prescriptions Disp Refills    phentermine (ADIPEX-P) 37.5 MG tablet 30 tablet 1     Sig: Take 1 tablet by mouth every morning (before breakfast) for 30 days.

## 2022-06-17 RX ORDER — DESVENLAFAXINE 100 MG/1
TABLET, EXTENDED RELEASE ORAL
Qty: 90 TABLET | Refills: 1 | Status: SHIPPED | OUTPATIENT
Start: 2022-06-17

## 2022-06-17 NOTE — TELEPHONE ENCOUNTER
Rosalba called requesting a refill of the below medication which has been pended for you:     Requested Prescriptions     Pending Prescriptions Disp Refills    desvenlafaxine succinate (PRISTIQ) 100 MG TB24 extended release tablet 90 tablet 1     Sig: TAKE 1 TABLET BY MOUTH ONCE DAILY       Last Appointment Date: 5/12/2022  Next Appointment Date: 6/21/2022    No Known Allergies

## 2022-06-17 NOTE — TELEPHONE ENCOUNTER
Patient is needing refill for Desvenlafaxine sent to [facility].

## 2022-06-22 ENCOUNTER — OFFICE VISIT (OUTPATIENT)
Age: 52
End: 2022-06-22
Payer: COMMERCIAL

## 2022-06-22 VITALS
RESPIRATION RATE: 20 BRPM | BODY MASS INDEX: 31.13 KG/M2 | SYSTOLIC BLOOD PRESSURE: 132 MMHG | WEIGHT: 205.4 LBS | HEIGHT: 68 IN | HEART RATE: 112 BPM | TEMPERATURE: 96.8 F | OXYGEN SATURATION: 98 % | DIASTOLIC BLOOD PRESSURE: 82 MMHG

## 2022-06-22 DIAGNOSIS — J02.9 SORETHROAT: Primary | ICD-10-CM

## 2022-06-22 LAB — S PYO AG THROAT QL: NORMAL

## 2022-06-22 PROCEDURE — 99213 OFFICE O/P EST LOW 20 MIN: CPT | Performed by: NURSE PRACTITIONER

## 2022-06-22 PROCEDURE — 87880 STREP A ASSAY W/OPTIC: CPT | Performed by: NURSE PRACTITIONER

## 2022-06-22 ASSESSMENT — ENCOUNTER SYMPTOMS
ABDOMINAL PAIN: 0
EYES NEGATIVE: 1
VOMITING: 0
NAUSEA: 0
TROUBLE SWALLOWING: 1
VOICE CHANGE: 0
SORE THROAT: 1
WHEEZING: 0
COUGH: 0
RESPIRATORY NEGATIVE: 1

## 2022-06-22 NOTE — PROGRESS NOTES
[id number]  [address] [facility] [address] [address]  Dept: [phone]  Dept Fax: [phone]  Loc: [phone]     Kaushik Loja is a 45 y.o. female who presents today for her medical conditions/complaintsas noted below. Kaushik Loja is c/o of Pharyngitis        HPI:     HPI    Pharyngitis  This is a new problem. The current episode started in the past 7 days. The problem occurs constantly. The problem has been unchanged. Associated symptoms include congestion, a sore throat and swollen glands. Pertinent negatives include  abdominal pain, anorexia, arthralgias, change in bowel habit, chest pain,  coughing, joint swelling, myalgias, nausea, neck pain, numbness, rash, urinary symptoms, vertigo, visual change, vomiting or weakness. The symptoms are aggravated by eating and drinking. The patient has tried ibuprofen for the symptoms. The treatment provided no relief. Results for orders placed or performed in visit on 06/22/22   POCT rapid strep A   Result Value Ref Range    Strep A Ag None Detected None Detected        Past Medical History:   Diagnosis Date    Acquired deviated nasal septum     Anal bleeding 10/31/2016    Ankle injury     Asthma     Benign paroxysmal positional vertigo     Derangement of medial meniscus     L knee mild, clinical diagnosis 9/19/12 after turning foot and popping sound 9/16/12 while walking and turn.     Eustachian tube dysfunction     Excessive sleepiness     During the day    Generalized anxiety disorder     H/O varicose veins     History of sprain of foot     Osteoarthritis     Pain in joint of left knee     Palpitations     Psoriasis       Past Surgical History:   Procedure Laterality Date    APPENDECTOMY      BREAST SURGERY      lift and a left implant   Brandy Anderson Code    ENDOSCOPY, COLON, DIAGNOSTIC      KNEE ARTHROSCOPY Bilateral  IA COLONOSCOPY FLX DX W/COLLJ SPEC WHEN PFRMD N/A 2016    Dr Vonda Saleh    SINUS SURGERY      nasal septoplasty/ maxillary antrostomies   Natividad Avila       Family History   Problem Relation Age of Onset    Colon Cancer Neg Hx     Colon Polyps Neg Hx     Esophageal Cancer Neg Hx     Liver Cancer Neg Hx     Liver Disease Neg Hx     Stomach Cancer Neg Hx     Rectal Cancer Neg Hx        Social History     Tobacco Use    Smoking status: Former Smoker     Packs/day: 1.00     Years: 20.00     Pack years: 20.00     Start date:      Quit date:      Years since quittin.4    Smokeless tobacco: Never Used   Substance Use Topics    Alcohol use: Yes     Comment: occ      Current Outpatient Medications   Medication Sig Dispense Refill    desvenlafaxine succinate (PRISTIQ) 100 MG TB24 extended release tablet TAKE 1 TABLET BY MOUTH ONCE DAILY 90 tablet 1    phentermine (ADIPEX-P) 37.5 MG tablet Take 1 tablet by mouth every morning (before breakfast) for 30 days. 30 tablet 1    busPIRone (BUSPAR) 15 MG tablet TAKE 1 TO 2 TABLETS BY MOUTH TWICE DAILY. 120 tablet 3    fluticasone (FLONASE) 50 MCG/ACT nasal spray 2 sprays by Each Nostril route daily 1 Bottle 0    albuterol sulfate HFA (VENTOLIN HFA) 108 (90 Base) MCG/ACT inhaler Inhale 2 puffs into the lungs every 6 hours as needed for Wheezing 1 Inhaler 3    naproxen (NAPROSYN) 500 MG tablet Take 1 tablet by mouth 2 times daily 20 tablet 0    clobetasol (TEMOVATE) 0.05 % ointment Apply topically daily Apply topically daily. (Patient not taking: Reported on 2022) 30 g 0    metFORMIN (GLUCOPHAGE-XR) 500 MG extended release tablet TAKE 2 TABLETS BY MOUTH DAILY WITH SUPPER.  (Patient not taking: Reported on 2022) 60 tablet 3    guselkumab (TREMFYA) 100 MG/ML SOSY injection Inject 100 mg into the skin once (Patient not taking: Reported on 2022)       No current facility-administered medications for this visit. No Known Allergies    Health Maintenance   Topic Date Due    DTaP/Tdap/Td vaccine (1 - Tdap) Never done    Cervical cancer screen  08/23/2020    COVID-19 Vaccine (3 - Booster for Moderna series) 10/10/2021    Colorectal Cancer Screen  12/19/2021    Shingles vaccine (1 of 2) 12/07/2022 (Originally 6/12/2018)    A1C test (Diabetic or Prediabetic)  12/01/2022    Depression Monitoring  02/28/2023    Breast cancer screen  07/08/2023    Lipids  12/01/2026    Flu vaccine  Completed    Hepatitis C screen  Completed    HIV screen  Completed    Hepatitis A vaccine  Aged Out    Hepatitis B vaccine  Aged Out    Hib vaccine  Aged Out    Meningococcal (ACWY) vaccine  Aged Out    Pneumococcal 0-64 years Vaccine  Aged Out       Subjective:     Review of Systems   Constitutional: Negative. Negative for fatigue and fever. HENT: Positive for sore throat and trouble swallowing. Negative for congestion, ear pain and voice change. Eyes: Negative. Respiratory: Negative. Negative for cough and wheezing. Cardiovascular: Negative for chest pain. Gastrointestinal: Negative for abdominal pain, nausea and vomiting. Endocrine: Negative. Genitourinary: Negative. Musculoskeletal: Negative. Negative for myalgias. Skin: Negative. Negative for rash. Neurological: Negative. Negative for headaches. Hematological: Negative. Psychiatric/Behavioral: Negative. Objective:     Physical Exam  Vitals and nursing note reviewed. Constitutional:       General: She is not in acute distress. Appearance: She is well-developed. She is not ill-appearing or toxic-appearing. HENT:      Head: Normocephalic and atraumatic. Right Ear: Hearing, tympanic membrane, ear canal and external ear normal.      Left Ear: Hearing, tympanic membrane, ear canal and external ear normal.      Nose: Nose normal.      Mouth/Throat:      Pharynx: Uvula midline. Posterior oropharyngeal erythema present. No oropharyngeal exudate. Tonsils: No tonsillar abscesses. Eyes:      General:         Right eye: No discharge. Left eye: No discharge. Conjunctiva/sclera: Conjunctivae normal.      Pupils: Pupils are equal, round, and reactive to light. Neck:      Trachea: Trachea normal.   Cardiovascular:      Rate and Rhythm: Normal rate and regular rhythm. Pulmonary:      Effort: Pulmonary effort is normal.      Breath sounds: Normal breath sounds. No decreased breath sounds, wheezing, rhonchi or rales. Abdominal:      Palpations: Abdomen is soft. Tenderness: There is no abdominal tenderness. Musculoskeletal:      Cervical back: Normal range of motion. Lymphadenopathy:      Head:      Right side of head: No submental, submandibular, tonsillar, preauricular, posterior auricular or occipital adenopathy. Left side of head: No submental, submandibular, tonsillar, preauricular, posterior auricular or occipital adenopathy. Skin:     General: Skin is warm and moist.      Capillary Refill: Capillary refill takes less than 2 seconds. Findings: No rash. Neurological:      Mental Status: She is alert and oriented to person, place, and time. Psychiatric:         Speech: Speech normal.         Behavior: Behavior normal.         Thought Content: Thought content normal.         Judgment: Judgment normal.       /82   Pulse (!) 112   Temp 96.8 °F (36 °C)   Resp 20   Ht 5' 8\" (1.727 m)   Wt 205 lb 6.4 oz (93.2 kg)   SpO2 98%   BMI 31.23 kg/m²     Assessment:          Diagnosis Orders   1. Sorethroat  POCT rapid strep A       Plan:      Orders Placed This Encounter   Procedures    POCT rapid strep A        No follow-ups on file. Orders Placed This Encounter   Procedures    POCT rapid strep A     No orders of the defined types were placed in this encounter. Patient given educationalmaterials - see patient instructions.   Discussed use, benefit, and side effectsof prescribed medications. All patient questions answered. Pt voiced understanding. Reviewed health maintenance. Instructed to continue current medications, diet andexercise. Patient agreed with treatment plan. Follow up as directed. Patient Instructions   1. Continue OTC meds. 2. Use flonase daily. 3. Use mucinex daily. 4. Continue antihistamine daily. 5. Follow up as needed  6. I do not recommend a steroid at this time.         Electronically signed by MICHALE Petty CNP on 6/22/2022 at 9:54 AM

## 2022-06-22 NOTE — PATIENT INSTRUCTIONS
1. Continue OTC meds. 2. Use flonase daily. 3. Use mucinex daily. 4. Continue antihistamine daily. 5. Follow up as needed  6. I do not recommend a steroid at this time.

## 2022-06-23 ENCOUNTER — PATIENT MESSAGE (OUTPATIENT)
Dept: INTERNAL MEDICINE | Age: 52
End: 2022-06-23

## 2022-06-23 RX ORDER — VALACYCLOVIR HYDROCHLORIDE 1 G/1
1000 TABLET, FILM COATED ORAL 2 TIMES DAILY
Qty: 4 TABLET | Refills: 2 | Status: SHIPPED | OUTPATIENT
Start: 2022-06-23 | End: 2022-06-25

## 2022-06-23 NOTE — TELEPHONE ENCOUNTER
From: Bobby Joyce  To: Dr. Shari Morocho: 6/23/2022 10:05 AM CDT  Subject: Too [place] I wondered if Dr Krystina Gomez would call me in [place] [address] for my fever blisters? My dermatologist normally does but I havent been to see them for awhile and they wont call it in until I come back in.    Thank you  Rosalba

## 2022-07-20 RX ORDER — BUSPIRONE HYDROCHLORIDE 15 MG/1
TABLET ORAL
Qty: 120 TABLET | Refills: 3 | Status: SHIPPED | OUTPATIENT
Start: 2022-07-20

## 2022-07-20 NOTE — TELEPHONE ENCOUNTER
Rosalba called requesting a refill of the below medication which has been pended for you:     Requested Prescriptions     Pending Prescriptions Disp Refills    busPIRone (BUSPAR) 15 MG tablet [Pharmacy Med Name: BUSPIRONE HYDROCHLORIDE 15MG TABLET] 120 tablet 3     Sig: TAKE 1 TO 2 TABLETS BY MOUTH TWICE DAILY.        Last Appointment Date: 5/12/2022  Next Appointment Date: Visit date not found    No Known Allergies

## 2022-10-25 ENCOUNTER — APPOINTMENT (OUTPATIENT)
Dept: CT IMAGING | Facility: HOSPITAL | Age: 52
End: 2022-10-25

## 2022-10-25 ENCOUNTER — OFFICE VISIT (OUTPATIENT)
Age: 52
End: 2022-10-25

## 2022-10-25 ENCOUNTER — HOSPITAL ENCOUNTER (EMERGENCY)
Facility: HOSPITAL | Age: 52
Discharge: HOME | End: 2022-10-25
Admitting: EMERGENCY MEDICINE

## 2022-10-25 VITALS
TEMPERATURE: 97.9 F | WEIGHT: 212 LBS | SYSTOLIC BLOOD PRESSURE: 122 MMHG | OXYGEN SATURATION: 97 % | DIASTOLIC BLOOD PRESSURE: 70 MMHG | BODY MASS INDEX: 32.23 KG/M2 | RESPIRATION RATE: 18 BRPM | HEART RATE: 89 BPM

## 2022-10-25 VITALS
OXYGEN SATURATION: 99 % | TEMPERATURE: 98.7 F | HEIGHT: 68 IN | RESPIRATION RATE: 16 BRPM | SYSTOLIC BLOOD PRESSURE: 141 MMHG | WEIGHT: 212 LBS | BODY MASS INDEX: 32.13 KG/M2 | DIASTOLIC BLOOD PRESSURE: 81 MMHG | HEART RATE: 81 BPM

## 2022-10-25 DIAGNOSIS — K92.2 GASTROINTESTINAL HEMORRHAGE, UNSPECIFIED GASTROINTESTINAL HEMORRHAGE TYPE: ICD-10-CM

## 2022-10-25 DIAGNOSIS — R10.9 ABDOMINAL PAIN, UNSPECIFIED ABDOMINAL LOCATION: Primary | ICD-10-CM

## 2022-10-25 DIAGNOSIS — K62.5 RECTAL BLEEDING: Primary | ICD-10-CM

## 2022-10-25 DIAGNOSIS — K59.00 CONSTIPATION, UNSPECIFIED CONSTIPATION TYPE: ICD-10-CM

## 2022-10-25 DIAGNOSIS — K21.9 GASTROESOPHAGEAL REFLUX DISEASE, UNSPECIFIED WHETHER ESOPHAGITIS PRESENT: ICD-10-CM

## 2022-10-25 LAB
ALBUMIN SERPL-MCNC: 4 G/DL (ref 3.5–5.2)
ALBUMIN/GLOB SERPL: 1.3 G/DL
ALP SERPL-CCNC: 86 U/L (ref 39–117)
ALT SERPL W P-5'-P-CCNC: 23 U/L (ref 1–33)
ANION GAP SERPL CALCULATED.3IONS-SCNC: 7 MMOL/L (ref 5–15)
APTT PPP: 27.8 SECONDS (ref 24.1–35)
AST SERPL-CCNC: 29 U/L (ref 1–32)
BASOPHILS # BLD AUTO: 0.05 10*3/MM3 (ref 0–0.2)
BASOPHILS NFR BLD AUTO: 0.6 % (ref 0–1.5)
BILIRUB SERPL-MCNC: 0.2 MG/DL (ref 0–1.2)
BUN SERPL-MCNC: 12 MG/DL (ref 6–20)
BUN/CREAT SERPL: 20.7 (ref 7–25)
CALCIUM SPEC-SCNC: 9.3 MG/DL (ref 8.6–10.5)
CHLORIDE SERPL-SCNC: 104 MMOL/L (ref 98–107)
CO2 SERPL-SCNC: 29 MMOL/L (ref 22–29)
CREAT SERPL-MCNC: 0.58 MG/DL (ref 0.57–1)
D-LACTATE SERPL-SCNC: 0.8 MMOL/L (ref 0.5–2)
DEPRECATED RDW RBC AUTO: 48.5 FL (ref 37–54)
DEVELOPER EXPIRATION DATE: NORMAL
DEVELOPER LOT NUMBER: 225
EGFRCR SERPLBLD CKD-EPI 2021: 107.7 ML/MIN/1.73
EOSINOPHIL # BLD AUTO: 0.21 10*3/MM3 (ref 0–0.4)
EOSINOPHIL NFR BLD AUTO: 2.5 % (ref 0.3–6.2)
ERYTHROCYTE [DISTWIDTH] IN BLOOD BY AUTOMATED COUNT: 14 % (ref 12.3–15.4)
EXPIRATION DATE: NORMAL
FECAL OCCULT BLOOD SCREEN, POC: NEGATIVE
GLOBULIN UR ELPH-MCNC: 3 GM/DL
GLUCOSE SERPL-MCNC: 87 MG/DL (ref 65–99)
HCT VFR BLD AUTO: 42.4 % (ref 34–46.6)
HGB BLD-MCNC: 13.3 G/DL (ref 12–15.9)
IMM GRANULOCYTES # BLD AUTO: 0.03 10*3/MM3 (ref 0–0.05)
IMM GRANULOCYTES NFR BLD AUTO: 0.4 % (ref 0–0.5)
INR PPP: 1.01 (ref 0.91–1.09)
LIPASE SERPL-CCNC: 83 U/L (ref 13–60)
LYMPHOCYTES # BLD AUTO: 2.7 10*3/MM3 (ref 0.7–3.1)
LYMPHOCYTES NFR BLD AUTO: 32.6 % (ref 19.6–45.3)
Lab: 225
MCH RBC QN AUTO: 29.3 PG (ref 26.6–33)
MCHC RBC AUTO-ENTMCNC: 31.4 G/DL (ref 31.5–35.7)
MCV RBC AUTO: 93.4 FL (ref 79–97)
MONOCYTES # BLD AUTO: 0.82 10*3/MM3 (ref 0.1–0.9)
MONOCYTES NFR BLD AUTO: 9.9 % (ref 5–12)
NEGATIVE CONTROL: NEGATIVE
NEUTROPHILS NFR BLD AUTO: 4.46 10*3/MM3 (ref 1.7–7)
NEUTROPHILS NFR BLD AUTO: 54 % (ref 42.7–76)
NRBC BLD AUTO-RTO: 0 /100 WBC (ref 0–0.2)
PLATELET # BLD AUTO: 300 10*3/MM3 (ref 140–450)
PMV BLD AUTO: 10.7 FL (ref 6–12)
POSITIVE CONTROL: POSITIVE
POTASSIUM SERPL-SCNC: 4.2 MMOL/L (ref 3.5–5.2)
PROT SERPL-MCNC: 7 G/DL (ref 6–8.5)
PROTHROMBIN TIME: 12.9 SECONDS (ref 11.9–14.6)
RBC # BLD AUTO: 4.54 10*6/MM3 (ref 3.77–5.28)
SODIUM SERPL-SCNC: 140 MMOL/L (ref 136–145)
WBC NRBC COR # BLD: 8.27 10*3/MM3 (ref 3.4–10.8)

## 2022-10-25 PROCEDURE — 82270 OCCULT BLOOD FECES: CPT | Performed by: NURSE PRACTITIONER

## 2022-10-25 PROCEDURE — 74176 CT ABD & PELVIS W/O CONTRAST: CPT

## 2022-10-25 PROCEDURE — 85610 PROTHROMBIN TIME: CPT | Performed by: NURSE PRACTITIONER

## 2022-10-25 PROCEDURE — 83605 ASSAY OF LACTIC ACID: CPT | Performed by: NURSE PRACTITIONER

## 2022-10-25 PROCEDURE — 96374 THER/PROPH/DIAG INJ IV PUSH: CPT

## 2022-10-25 PROCEDURE — 80053 COMPREHEN METABOLIC PANEL: CPT | Performed by: NURSE PRACTITIONER

## 2022-10-25 PROCEDURE — 85025 COMPLETE CBC W/AUTO DIFF WBC: CPT | Performed by: NURSE PRACTITIONER

## 2022-10-25 PROCEDURE — 99283 EMERGENCY DEPT VISIT LOW MDM: CPT

## 2022-10-25 PROCEDURE — 83690 ASSAY OF LIPASE: CPT | Performed by: NURSE PRACTITIONER

## 2022-10-25 PROCEDURE — 85730 THROMBOPLASTIN TIME PARTIAL: CPT | Performed by: NURSE PRACTITIONER

## 2022-10-25 RX ORDER — FAMOTIDINE 10 MG/ML
20 INJECTION, SOLUTION INTRAVENOUS ONCE
Status: COMPLETED | OUTPATIENT
Start: 2022-10-25 | End: 2022-10-25

## 2022-10-25 RX ORDER — DESVENLAFAXINE SUCCINATE 50 MG/1
50 TABLET, EXTENDED RELEASE ORAL DAILY
COMMUNITY

## 2022-10-25 RX ORDER — PANTOPRAZOLE SODIUM 40 MG/1
40 TABLET, DELAYED RELEASE ORAL DAILY
Qty: 30 TABLET | Refills: 0 | Status: SHIPPED | OUTPATIENT
Start: 2022-10-25

## 2022-10-25 RX ORDER — CETIRIZINE HYDROCHLORIDE 10 MG/1
10 TABLET ORAL DAILY
COMMUNITY

## 2022-10-25 RX ORDER — ONDANSETRON 4 MG/1
4 TABLET, ORALLY DISINTEGRATING ORAL EVERY 6 HOURS PRN
Qty: 12 TABLET | Refills: 0 | Status: SHIPPED | OUTPATIENT
Start: 2022-10-25

## 2022-10-25 RX ADMIN — SODIUM CHLORIDE 500 ML: 9 INJECTION, SOLUTION INTRAVENOUS at 11:19

## 2022-10-25 RX ADMIN — FAMOTIDINE 20 MG: 10 INJECTION INTRAVENOUS at 11:21

## 2022-10-25 NOTE — PROGRESS NOTES
Patient reports burning abdominal pain and mucousy stools with dark red blood. She denies any rectal pain, advised patient to go to ER to r/o intra-abdominal emergencies. Patient states she is going to [facility] ER. Patient stable.

## 2022-10-25 NOTE — DISCHARGE INSTRUCTIONS
You may take over the counter miralax as directed for the constipation  Zofran was sent to your pharmacy for nausea however this may contribute to constipation.  We additionally sent over protonix to see if this helps with the reflux sxs. You have a scheduled appt with your pcp which you will need to keep for further evaluation and possible referral to GI. Return with worsening sxs  Decrease life stressors

## 2022-10-26 NOTE — ED PROVIDER NOTES
Subjective   History of Present Illness  Patient is a 52-year-old female who presents the ER with complaints of abdominal pain.  Patient reports she has had burning and abdominal discomfort to her mid abdomen since last Wednesday.  She states that she has noted somewhat dark stool however denies any black-colored stool.  She has had no hematemesis.  She has not been diagnosed with reflux however believes that she may have the symptoms in particular after eating.  Patient was evaluated at [facility] and sent to the ER for evaluation and treatment.  Patient has had nausea without any vomiting.  She describes burning at times.  Patient states she has been under quite a bit of stress lately and is in the process of moving.  She states that her job has been quite stressful as well.  Patient denies any recorded fevers.  Past medical history significant for anxiety, arthritis, depression.        Review of Systems   Constitutional: Negative.  Negative for fever.   HENT: Negative.  Negative for congestion.    Eyes: Negative.    Respiratory: Negative.  Negative for cough and shortness of breath.    Cardiovascular: Negative.  Negative for chest pain.   Gastrointestinal: Positive for abdominal pain, blood in stool and nausea. Negative for constipation, diarrhea and vomiting.   Genitourinary: Negative.  Negative for difficulty urinating, dysuria, flank pain and hematuria.   Musculoskeletal: Negative.  Negative for back pain.   Skin: Negative.  Negative for rash.   All other systems reviewed and are negative.      Past Medical History:   Diagnosis Date   • Anxiety    • Arthritis    • Depression        No Known Allergies    Past Surgical History:   Procedure Laterality Date   • APPENDECTOMY     • AUGMENTATION MAMMAPLASTY Left 2007    pt had a lift and implant on the left side only   • BREAST SURGERY Left     lifted and implant   • KNEE SURGERY     • SINUS SURGERY     • TONSILLECTOMY         Family History   Problem Relation Age of  Onset   • Anuerysm Mother    • Diabetes Father    • Breast cancer Paternal Aunt    • Breast cancer Paternal Aunt        Social History     Socioeconomic History   • Marital status:    Tobacco Use   • Smoking status: Never   Substance and Sexual Activity   • Alcohol use: Yes     Comment: seldom   • Drug use: No           Objective   Physical Exam  Vitals and nursing note reviewed.   Constitutional:       Appearance: She is well-developed.   HENT:      Head: Normocephalic and atraumatic.      Nose: Nose normal.   Eyes:      Conjunctiva/sclera: Conjunctivae normal.      Pupils: Pupils are equal, round, and reactive to light.   Cardiovascular:      Rate and Rhythm: Normal rate and regular rhythm.      Heart sounds: Normal heart sounds.   Pulmonary:      Effort: Pulmonary effort is normal.      Breath sounds: Normal breath sounds.   Abdominal:      General: Bowel sounds are normal.      Palpations: Abdomen is soft.      Tenderness: There is abdominal tenderness.      Comments: Patient has pain to the mid abdomen on palpation above the periumbilical region, bowel sounds are noted all 4 quadrants.   Genitourinary:     Rectum: Guaiac result negative. No tenderness.      Comments: No hemorrhoids noted on exam, occult stool was negative  Musculoskeletal:         General: Normal range of motion.      Cervical back: Normal range of motion and neck supple.   Skin:     General: Skin is warm and dry.      Capillary Refill: Capillary refill takes less than 2 seconds.   Neurological:      General: No focal deficit present.      Mental Status: She is alert and oriented to person, place, and time.   Psychiatric:         Behavior: Behavior normal.         Procedures           ED Course   Patient has prominent stool noted on CT scan.  We recommended over-the-counter MiraLAX for this issue.  Patient's occult stool was negative.  We went ahead and prescribed a PPI given patient's symptoms and recommend follow-up with PCP for  possible GI referral.  Patient states that she is likely due for another colonoscopy and she has never had an endoscopy.  Patient reports feeling better on reexam.  She has been in no distress throughout her stay in the emergency department.  Summary:  1. Prominent fecal material throughout the colon.  2. No mass, fluid collection, or inflammatory process.          Labs Reviewed   LIPASE - Abnormal; Notable for the following components:       Result Value    Lipase 83 (*)     All other components within normal limits   CBC WITH AUTO DIFFERENTIAL - Abnormal; Notable for the following components:    MCHC 31.4 (*)     All other components within normal limits   PROTIME-INR - Normal   APTT - Normal   LACTIC ACID, PLASMA - Normal   POCT OCCULT BLOOD STOOL - Normal   COMPREHENSIVE METABOLIC PANEL    Narrative:     GFR Normal >60  Chronic Kidney Disease <60  Kidney Failure <15     CBC AND DIFFERENTIAL    Narrative:     The following orders were created for panel order CBC & Differential.  Procedure                               Abnormality         Status                     ---------                               -----------         ------                     CBC Auto Differential[[id number]]        Abnormal            Final result                 Please view results for these tests on the individual orders.     ED Course as of 10/26/22 1007   Tue Oct 25, 2022   1335 Occult stool was negative. No hemorrhoids noted on exam. [TW]   1335    Summary:  1. Prominent fecal material throughout the colon.  2. No mass, fluid collection, or inflammatory process.    [TW]   1336 No bleeding while in the ER. Patient mentions having a burning sensation to the abdomen with eating. We will prescribe a PPI. She has f/u with her pcp on Monday. We recommend f/u with GI for outpatient endoscopy and colonoscopy. [TW]      ED Course User Index  [TW] Aurora Wynn, APRN                                           MDM  Number of Diagnoses or  Management Options  Abdominal pain, unspecified abdominal location: new and requires workup  Constipation, unspecified constipation type: new and requires workup  Gastroesophageal reflux disease, unspecified whether esophagitis present: new and requires workup  Gastrointestinal hemorrhage, unspecified gastrointestinal hemorrhage type: new and requires workup     Amount and/or Complexity of Data Reviewed  Clinical lab tests: ordered and reviewed  Tests in the radiology section of CPT®: ordered and reviewed  Discuss the patient with other providers: yes    Risk of Complications, Morbidity, and/or Mortality  Presenting problems: moderate  Diagnostic procedures: moderate  Management options: moderate    Patient Progress  Patient progress: improved      Final diagnoses:   Abdominal pain, unspecified abdominal location   Gastrointestinal hemorrhage, unspecified gastrointestinal hemorrhage type   Gastroesophageal reflux disease, unspecified whether esophagitis present   Constipation, unspecified constipation type       ED Disposition  ED Disposition     ED Disposition   Discharge    Condition   Good    Comment   --             No follow-up provider specified.       Medication List      New Prescriptions    ondansetron ODT 4 MG disintegrating tablet  Commonly known as: ZOFRAN-ODT  Place 1 tablet on the tongue Every 6 (Six) Hours As Needed for Nausea.     pantoprazole 40 MG EC tablet  Commonly known as: PROTONIX  Take 1 tablet by mouth Daily.           Where to Get Your Medications      These medications were sent to [facility], [facility] - [place] - [address] - [phone]  - [phone] FX  [address], [facility] [address]    Phone: [phone]   · ondansetron ODT 4 MG disintegrating tablet  · pantoprazole 40 MG EC tablet          Aurora Wynn, APRN  10/26/22 1007

## 2022-10-31 ENCOUNTER — OFFICE VISIT (OUTPATIENT)
Dept: INTERNAL MEDICINE | Age: 52
End: 2022-10-31

## 2022-10-31 VITALS
BODY MASS INDEX: 32.74 KG/M2 | RESPIRATION RATE: 18 BRPM | HEART RATE: 58 BPM | HEIGHT: 68 IN | OXYGEN SATURATION: 100 % | DIASTOLIC BLOOD PRESSURE: 80 MMHG | WEIGHT: 216 LBS | SYSTOLIC BLOOD PRESSURE: 112 MMHG

## 2022-10-31 DIAGNOSIS — K92.1 BLOOD IN STOOL: ICD-10-CM

## 2022-10-31 DIAGNOSIS — Z23 FLU VACCINE NEED: ICD-10-CM

## 2022-10-31 DIAGNOSIS — K62.5 RECTAL BLEEDING: Primary | ICD-10-CM

## 2022-10-31 SDOH — ECONOMIC STABILITY: FOOD INSECURITY: WITHIN THE PAST 12 MONTHS, THE FOOD YOU BOUGHT JUST DIDN'T LAST AND YOU DIDN'T HAVE MONEY TO GET MORE.: NEVER TRUE

## 2022-10-31 SDOH — ECONOMIC STABILITY: FOOD INSECURITY: WITHIN THE PAST 12 MONTHS, YOU WORRIED THAT YOUR FOOD WOULD RUN OUT BEFORE YOU GOT MONEY TO BUY MORE.: NEVER TRUE

## 2022-10-31 ASSESSMENT — ENCOUNTER SYMPTOMS
ABDOMINAL PAIN: 0
SORE THROAT: 0
CHEST TIGHTNESS: 0
WHEEZING: 0
COUGH: 0
CONSTIPATION: 0

## 2022-10-31 ASSESSMENT — SOCIAL DETERMINANTS OF HEALTH (SDOH): HOW HARD IS IT FOR YOU TO PAY FOR THE VERY BASICS LIKE FOOD, HOUSING, MEDICAL CARE, AND HEATING?: NOT HARD AT ALL

## 2022-10-31 NOTE — PROGRESS NOTES
Chief Complaint   Patient presents with    Rectal Bleeding     On going for about 1.5 weeks. States that she went to the ED, did a CT scan, and states that it looked good, said she needs to see a Gastro Physician to get an EGD and Colonoscopy set up      History of presenting illness:  Yogesh Cordero is a48 y.o. female who presents today for follow up on her chronic medical conditions as noted below. 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    Generalized anxiety disorder 10/22/2017    IFG (impaired fasting glucose) 10/22/2017    Psoriasis 10/22/2017    IBS (irritable bowel syndrome) 10/22/2017    History of bone density study      Overview Note:     4/2018 normal      Abnormal liver function 08/15/2017    Foot fracture, left 08/15/2017    Foot pain 08/15/2017    Altered bowel function 10/31/2016     Overview Note:     Updating Deprecated Diagnoses      Elevated blood pressure 10/15/2016    Essential (primary) hypertension 10/15/2016     Past Medical History:   Diagnosis Date    Acquired deviated nasal septum     Anal bleeding 10/31/2016    Ankle injury     Asthma     Benign paroxysmal positional vertigo     Derangement of medial meniscus     L knee mild, clinical diagnosis 9/19/12 after turning foot and popping sound 9/16/12 while walking and turn.     Eustachian tube dysfunction     Excessive sleepiness     During the day    Generalized anxiety disorder     H/O varicose veins     History of sprain of foot     Osteoarthritis     Pain in joint of left knee     Palpitations     Psoriasis       Past Surgical History:   Procedure Laterality Date    APPENDECTOMY      BREAST SURGERY      lift and a left implant    [facility]    Dr. Nataliya Osman    ENDOSCOPY, COLON, DIAGNOSTIC      KNEE ARTHROSCOPY Bilateral     [place] COLONOSCOPY FLX DX W/COLLJ SPEC WHEN PFRMD N/A 2016    Dr Last Cisneros    SINUS SURGERY      nasal septoplasty/ maxillary antrostomies    Benjamin Martinez [place], [place]     Current Outpatient Medications   Medication Sig Dispense Refill    busPIRone (BUSPAR) 15 MG tablet TAKE 1 TO 2 TABLETS BY MOUTH TWICE DAILY. 120 tablet 3    desvenlafaxine succinate (PRISTIQ) 100 MG TB24 extended release tablet TAKE 1 TABLET BY MOUTH ONCE DAILY 90 tablet 1    clobetasol (TEMOVATE) 0.05 % ointment Apply topically daily Apply topically daily. (Patient not taking: No sig reported) 30 g 0    metFORMIN (GLUCOPHAGE-XR) 500 MG extended release tablet TAKE 2 TABLETS BY MOUTH DAILY WITH SUPPER. (Patient not taking: Reported on 10/25/2022) 60 tablet 3    guselkumab (TREMFYA) 100 MG/ML SOSY injection Inject 100 mg into the skin once (Patient not taking: Reported on 10/25/2022)      fluticasone (FLONASE) 50 MCG/ACT nasal spray 2 sprays by Each Nostril route daily 1 Bottle 0    albuterol sulfate HFA (VENTOLIN HFA) 108 (90 Base) MCG/ACT inhaler Inhale 2 puffs into the lungs every 6 hours as needed for Wheezing (Patient not taking: Reported on 10/25/2022) 1 Inhaler 3    naproxen (NAPROSYN) 500 MG tablet Take 1 tablet by mouth 2 times daily 20 tablet 0     No current facility-administered medications for this visit.      No Known Allergies  Social History     Tobacco Use    Smoking status: Former     Packs/day: 1.00     Years: 20.00     Pack years: 20.00     Types: Cigarettes     Start date:      Quit date:      Years since quittin.8    Smokeless tobacco: Never   Substance Use Topics    Alcohol use: Yes     Comment: occ      Family History   Problem Relation Age of Onset    Colon Cancer Neg Hx     Colon Polyps Neg Hx     Esophageal Cancer Neg Hx     Liver Cancer Neg Hx     Liver Disease Neg Hx     Stomach Cancer Neg Hx     Rectal Cancer Neg Hx        Review of Systems   Constitutional:  Negative for chills, fatigue and fever. HENT:  Negative for congestion, ear pain, nosebleeds, postnasal drip and sore throat. Respiratory:  Negative for cough, chest tightness and wheezing. Cardiovascular:  Negative for chest pain, palpitations and leg swelling. Gastrointestinal:  Negative for abdominal pain and constipation. Genitourinary:  Negative for dysuria and urgency. Musculoskeletal: Negative. Negative for arthralgias. Skin:  Negative for rash. Neurological:  Negative for dizziness and headaches. Psychiatric/Behavioral: Negative. Vitals:    10/31/22 1151   BP: 112/80   Site: Left Upper Arm   Position: Sitting   Cuff Size: Large Adult   Pulse: 58   Resp: 18   SpO2: 100%   Weight: 216 lb (98 kg)   Height: 5' 8\" (1.727 m)     Body mass index is 32.84 kg/m². Physical Exam  Constitutional:       Appearance: She is well-developed. HENT:      Right Ear: External ear normal.      Left Ear: External ear normal.      Mouth/Throat:      Pharynx: No oropharyngeal exudate. Eyes:      Conjunctiva/sclera: Conjunctivae normal.      Pupils: Pupils are equal, round, and reactive to light. Neck:      Thyroid: No thyromegaly. Vascular: No JVD. Cardiovascular:      Rate and Rhythm: Normal rate. Heart sounds: Normal heart sounds. No murmur heard. Pulmonary:      Effort: No respiratory distress. Breath sounds: Rales present. No wheezing. Chest:      Chest wall: No tenderness. Abdominal:      General: Bowel sounds are normal.      Palpations: Abdomen is soft. Musculoskeletal:      Cervical back: Neck supple. Lymphadenopathy:      Cervical: No cervical adenopathy. Skin:     Findings: No rash. Lab Review   No visits with results within 2 Month(s) from this visit.    Latest known visit with results is:   Office Visit on 06/22/2022   Component Date Value    Strep A Ag 06/22/2022 None Detected            ASSESSMENT/PLAN:    Rectal bleeding    Blood in stool    Vague epigastric discomfort at times    Have independently reviewed all emergency room records test results and lab results and discussed with patient  Ct done at ER 10/25/22  There is a normal appearance of the uterus and ovaries. Summary:   1. Prominent fecal material throughout the colon. 2. No mass, fluid collection, or inflammatory process. LAB done also - all negative    Needs gi evaluaton- order placed   ( Last cscope 12/2016- neg)    Gi referral placed    Continue Protonix since 10/25/2022  Was prescribed at the emergency room  Take until your GI appointment  Avooid / DC any NSAID    Flu vaccine need  -     Influenza, FLUCELVAX, (age 10 mo+), IM, Preservative Free, 0.5 mL            Orders Placed This Encounter   Procedures    Influenza, FLUCELVAX, (age 10 mo+), IM, Preservative Free, 0.5 mL    Emilia Plasencia MD, Gastroenterology, [facility]     New Prescriptions    No medications on file         No follow-ups on file. There are no Patient Instructions on file for this visit. EMR Dragon/transcription disclaimer:Significant part of this  encounter note is electronic transcription/translationof spoken language to printed text. The electronic translation of spoken language may be erroneous, or at times, nonsensical words or phrases may be inadvertently transcribed.  Although I have reviewed the note for sucherrors, some may still exist.

## 2022-11-02 ENCOUNTER — OFFICE VISIT (OUTPATIENT)
Dept: GASTROENTEROLOGY | Age: 52
End: 2022-11-02
Payer: COMMERCIAL

## 2022-11-02 VITALS
BODY MASS INDEX: 32.67 KG/M2 | WEIGHT: 215.6 LBS | SYSTOLIC BLOOD PRESSURE: 112 MMHG | HEIGHT: 68 IN | HEART RATE: 82 BPM | OXYGEN SATURATION: 98 % | DIASTOLIC BLOOD PRESSURE: 72 MMHG

## 2022-11-02 DIAGNOSIS — R11.0 NAUSEA: ICD-10-CM

## 2022-11-02 DIAGNOSIS — K62.5 RECTAL BLEEDING: ICD-10-CM

## 2022-11-02 DIAGNOSIS — R10.13 EPIGASTRIC PAIN: Primary | ICD-10-CM

## 2022-11-02 PROCEDURE — 99204 OFFICE O/P NEW MOD 45 MIN: CPT | Performed by: NURSE PRACTITIONER

## 2022-11-02 PROCEDURE — 3078F DIAST BP <80 MM HG: CPT | Performed by: NURSE PRACTITIONER

## 2022-11-02 PROCEDURE — 3074F SYST BP LT 130 MM HG: CPT | Performed by: NURSE PRACTITIONER

## 2022-11-02 RX ORDER — PANTOPRAZOLE SODIUM 40 MG/1
TABLET, DELAYED RELEASE ORAL DAILY
COMMUNITY
Start: 2022-10-25

## 2022-11-02 RX ORDER — CETIRIZINE HYDROCHLORIDE 10 MG/1
10 TABLET ORAL DAILY
COMMUNITY

## 2022-11-02 ASSESSMENT — ENCOUNTER SYMPTOMS
BLOOD IN STOOL: 0
TROUBLE SWALLOWING: 0
NAUSEA: 1
COUGH: 0
CHOKING: 0
SHORTNESS OF BREATH: 0
ANAL BLEEDING: 1
ABDOMINAL PAIN: 1
ABDOMINAL DISTENTION: 0
DIARRHEA: 0
RECTAL PAIN: 0
VOMITING: 0
CONSTIPATION: 0

## 2022-11-02 NOTE — PATIENT INSTRUCTIONS
Schedule colonoscopy and endoscopy. Do not eat or drink after midnight the day of the procedure. Allowed medications can be taken with a small sip of water. Please review your prep instructions for allowed medications. You will not be able to drive for 24 hours after the procedure due to sedation. Must have a responsible adult, 18 years or older, accompany you to drive you home the day of your procedure. If you are on blood thinners, clearance from the prescribing physician will be obtained before your procedure is scheduled. If it is determined it is not safe to hold these medications for a short time an alternative procedure for evaluation may be recommended. No aspirin, ibuprofen, naproxen, fish oil or vitamin E for 5 days before procedure. Risks of colonoscopy and endoscopy include, but are not limited to, perforation, bleeding, and infection, Risk of perforation and bleeding are increased if there is a polyp removed or dilation completed. Anesthesia risks will be reviewed with you before the procedure by a member of the anesthesia department. Your physician may also schedule a follow up appointment with the nurse practitioner to discuss pathology, symptoms or to check if you have had any problems related to your procedure. If you prefer not to return to the office after your procedure please discuss this with your physician on the day of your colonoscopy. The physician will talk with you and/or your family after the procedure is completed. Final recommendations are based on the pathologist report if biopsies or specimens are taken. If polyps are removed during the procedure they will be sent to a pathologist for analysis. Unless you have a follow up appointment scheduled, you will be notified by mail of the pathology results within 4 weeks. If you have not received results after 4 weeks you may call the office to obtain this information. For Colonoscopy:   You will be given specific directions regarding restrictions to diet and bowel prep instructions including laxatives. Please read these instructions one week prior to your scheduled procedure to ensure that you are prepared. If you have any questions regarding these instructions please call our office Mon through Fri from 8:00 am to 4:00 pm.     Follow prep instructions provided for bowel prep. Take all of the bowel prep as directed. If you are having problems with nausea, stop your prep for 30-45 min to allow the nausea to subside before resuming your prep. It is important to drink plenty of fluids throughout the day before taking your laxatives. This will help to protect your kidneys, prevent dehydration and maximize the effect of the bowel prep. Your diet before a colonoscopy bowel preparation is very important to ensure a successful colon exam. It is recommended to consider certain changes to your diet three to four days prior to the procedure. Remember that your bowels need to be completely empty for the exam.    What foods are good to eat? Cut down on heavy solid foods three to four days before the procedure and start introducing lighter meals to your diet. The following food suggestions are a good part of your diet before a colonoscopy bowel preparation. Light meat that is easily digestible such as chicken (without the skin)   Potatoes without skin   Cheese   Eggs   A light meal of steamed white fish   Light clear soups    Foods and drinks to avoid  Avoid foods that contain too much fiber. Stay clear of dark colored beverages. They can stick to the walls of the digestive tract and make it difficult to differentiate from blood.  Some of these foods are:  Red meat, rice, nuts and vegetables   Milk, other milk based fluids and cream   Most fruit and puddings   Whole grain pasta   Cereals, bran and seeds   Colored beverages, especially those that are red or purple in color   Red colored Jell-O   On the day before the colonoscopy, continue to drink plenty of clear fluids. It is important   to keep yourself hydrated before the exam.     Please follow all instructions as provided for cleansing the bowel. Failure to have an adequately prepped colon may cause you to have incomplete exam with further testing required.      [email or web address]

## 2022-11-02 NOTE — PROGRESS NOTES
Subjective:     Patient ID: Jazmin Zhao is a 45 y.o. female  PCP: Luis Phillips MD  Referring Provider: Lavelle Andrea MD    HPI  Patient presents to the office today with the following complaints: Follow-up      Pt seen today for c/o rectal bleeding. Pt presented to [facility] ER on 10/25/22 with c/o abdominal pain and rectal bleeding. CT unremarkable other prominent stool throughout the colon. Symptoms began a couple weeks ago with nausea. She then had a BM with dark, maroon colored blood. This occurred twice. She reports chronic constipation at times. She reports some epigastric burning. Burning is constant. She denies any further bleeding. She was previously on Aleve daily, she has since stopped this. She was put on Pantoprazole in ER. She denies any reflux. Last EGD 1992 - Dr. Cait Bo   Last Colonoscopy 2016 [facility] (Dr. Dahiana Romero)  Family hx colon cancer - Maternal Grandmother    Assessment:     1. Epigastric pain    2. Rectal bleeding    3. Nausea            Plan:   - Continue Pantoprazole   - Schedule Colonoscopy and EGD  Instruct on bowel prep. Nothing to eat or drink after midnight the day of the exam.  Unable to drive for 24 hours after the procedure. No aspirin or nonsteroidal anti-inflammatories for 5 days before procedure. I have discussed the benefits, alternatives, and risks (including bleeding, perforation and death)  for pursuing Endoscopy (EGD/Colonscopy/EUS/ERCP) with the patient and they are willing to continue. We also discussed the need for anesthesia, IV access, proper dietary changes, medication changes if necessary, and need for bowel prep (if ordered) prior to their Endoscopic procedure. They are aware they must have someone accompany them to their scheduled procedure to drive them home - they agree to the above and are willing to continue. Orders  No orders of the defined types were placed in this encounter.     Medications  No orders of the defined types were placed in this encounter. Patient History:     Past Medical History:   Diagnosis Date    Acquired deviated nasal septum     Anal bleeding 10/31/2016    Ankle injury     Asthma     Benign paroxysmal positional vertigo     Derangement of medial meniscus     L knee mild, clinical diagnosis 12 after turning foot and popping sound 12 while walking and turn. Eustachian tube dysfunction     Excessive sleepiness     During the day    Generalized anxiety disorder     H/O varicose veins     History of sprain of foot     Osteoarthritis     Pain in joint of left knee     Palpitations     Psoriasis        Past Surgical History:   Procedure Laterality Date    APPENDECTOMY      BREAST SURGERY      lift and a left implant    Mami Rolon    ENDOSCOPY, COLON, DIAGNOSTIC      KNEE ARTHROSCOPY Bilateral     [facility] COLONOSCOPY FLX DX W/COLLJ SPEC WHEN PFRMD N/A 2016    Dr Radha Metcalf    SINUS SURGERY      nasal septoplasty/ maxillary antrostomies    Leonard Rolon       Family History   Problem Relation Age of Onset    Colon Cancer Maternal Grandmother 61    Colon Polyps Neg Hx     Esophageal Cancer Neg Hx     Liver Cancer Neg Hx     Liver Disease Neg Hx     Stomach Cancer Neg Hx     Rectal Cancer Neg Hx        Social History     Socioeconomic History    Marital status:     Tobacco Use    Smoking status: Former     Years: 20.00     Types: Cigarettes     Start date:      Quit date:      Years since quittin.8    Smokeless tobacco: Never   Vaping Use    Vaping Use: Never used   Substance and Sexual Activity    Alcohol use: Yes     Comment: occ    Drug use: No     Social Determinants of Health     Financial Resource Strain: Low Risk     Difficulty of Paying Living Expenses: Not hard at all   Food Insecurity: No Food Insecurity    Worried About [facility] in the Last Year: Never true    Ran Out of Food in the Last Year: Never true       Current Outpatient Medications   Medication Sig Dispense Refill    cetirizine (ZYRTEC) 10 MG tablet Take 10 mg by mouth daily      Multiple Vitamins-Minerals (VITEYES AREDS ADVANCED PO) Take by mouth daily      pantoprazole (PROTONIX) 40 MG tablet Take by mouth daily      busPIRone (BUSPAR) 15 MG tablet TAKE 1 TO 2 TABLETS BY MOUTH TWICE DAILY. 120 tablet 3    desvenlafaxine succinate (PRISTIQ) 100 MG TB24 extended release tablet TAKE 1 TABLET BY MOUTH ONCE DAILY 90 tablet 1    clobetasol (TEMOVATE) 0.05 % ointment Apply topically daily Apply topically daily. (Patient taking differently: Apply topically as needed Apply topically daily.) 30 g 0    fluticasone (FLONASE) 50 MCG/ACT nasal spray 2 sprays by Each Nostril route daily (Patient taking differently: 2 sprays by Each Nostril route as needed) 1 Bottle 0    albuterol sulfate HFA (VENTOLIN HFA) 108 (90 Base) MCG/ACT inhaler Inhale 2 puffs into the lungs every 6 hours as needed for Wheezing 1 Inhaler 3     No current facility-administered medications for this visit. No Known Allergies    Review of Systems   Constitutional:  Negative for activity change, appetite change, fatigue, fever and unexpected weight change. HENT:  Negative for trouble swallowing. Respiratory:  Negative for cough, choking and shortness of breath. Cardiovascular:  Negative for chest pain. Gastrointestinal:  Positive for abdominal pain, anal bleeding and nausea. Negative for abdominal distention, blood in stool, constipation, diarrhea, rectal pain and vomiting. Allergic/Immunologic: Negative for food allergies. All other systems reviewed and are negative. Objective:     /72   Pulse 82   Ht 5' 8\" (1.727 m)   Wt 215 lb 9.6 oz (97.8 kg)   SpO2 98%   BMI 32.78 kg/m²     Physical Exam  Vitals reviewed. Constitutional:       General: She is not in acute distress.      Appearance: She is well-developed. HENT:      Head: Normocephalic and atraumatic. Right Ear: External ear normal.      Left Ear: External ear normal.      Nose: Nose normal.   Eyes:      Conjunctiva/sclera: Conjunctivae normal.      Pupils: Pupils are equal, round, and reactive to light. Cardiovascular:      Rate and Rhythm: Normal rate and regular rhythm. Heart sounds: Normal heart sounds. No murmur heard. No friction rub. No gallop. Pulmonary:      Effort: Pulmonary effort is normal. No respiratory distress. Breath sounds: Normal breath sounds. Abdominal:      General: Bowel sounds are normal. There is no distension. Palpations: Abdomen is soft. There is no mass. Tenderness: There is no abdominal tenderness. There is no guarding or rebound. Musculoskeletal:         General: Normal range of motion. Cervical back: Normal range of motion and neck supple. Skin:     General: Skin is warm and dry. Findings: No rash. Nails: There is no clubbing. Neurological:      Mental Status: She is alert and oriented to person, place, and time. Gait: Gait normal.   Psychiatric:         Behavior: Behavior normal.         Thought Content:  Thought content normal.

## 2022-11-07 ENCOUNTER — OFFICE VISIT (OUTPATIENT)
Dept: INTERNAL MEDICINE | Age: 52
End: 2022-11-07

## 2022-11-07 VITALS
HEIGHT: 68 IN | WEIGHT: 208.4 LBS | BODY MASS INDEX: 31.58 KG/M2 | HEART RATE: 110 BPM | DIASTOLIC BLOOD PRESSURE: 80 MMHG | TEMPERATURE: 98.8 F | SYSTOLIC BLOOD PRESSURE: 118 MMHG | OXYGEN SATURATION: 98 %

## 2022-11-07 DIAGNOSIS — R05.2 SUBACUTE COUGH: ICD-10-CM

## 2022-11-07 DIAGNOSIS — R09.89 CHEST CONGESTION: ICD-10-CM

## 2022-11-07 DIAGNOSIS — J21.9 ACUTE BRONCHITIS AND BRONCHIOLITIS: Primary | ICD-10-CM

## 2022-11-07 DIAGNOSIS — R52 BODY ACHES: ICD-10-CM

## 2022-11-07 DIAGNOSIS — J20.9 ACUTE BRONCHITIS AND BRONCHIOLITIS: Primary | ICD-10-CM

## 2022-11-07 DIAGNOSIS — J02.9 SORE THROAT: ICD-10-CM

## 2022-11-07 LAB
INFLUENZA A ANTIBODY: ABNORMAL
INFLUENZA B ANTIBODY: ABNORMAL

## 2022-11-07 RX ORDER — PREDNISONE 10 MG/1
10 TABLET ORAL DAILY
Qty: 5 TABLET | Refills: 0 | Status: SHIPPED | OUTPATIENT
Start: 2022-11-07 | End: 2022-11-12

## 2022-11-07 RX ORDER — CEFUROXIME AXETIL 500 MG/1
500 TABLET ORAL 2 TIMES DAILY
Qty: 14 TABLET | Refills: 0 | Status: SHIPPED
Start: 2022-11-07 | End: 2022-11-07 | Stop reason: CLARIF

## 2022-11-07 ASSESSMENT — ENCOUNTER SYMPTOMS
CHEST TIGHTNESS: 0
SORE THROAT: 0
SINUS PAIN: 1
COUGH: 1
ABDOMINAL PAIN: 0
SINUS PRESSURE: 1
RHINORRHEA: 1
CONSTIPATION: 0
WHEEZING: 1

## 2022-11-07 NOTE — PROGRESS NOTES
Chief Complaint   Patient presents with    Congestion    Cough    Pharyngitis     All symptoms started Friday     Generalized Body Aches     History of presenting illness:  Casa Corley is a48 y.o. female     TELEHEALTH EVALUATION -- Audio/Visual (During JXPXL-94 public health emergency)  Patient location-home  Pursuant to the emergency declaration under the [facility] [address] authority and the [employer] Act, this Virtual  Visit was conducted, with patient's consent, to reduce the patient's risk of exposure to COVID-19 and provide continuity of care for an established patient. Services were provided through a video synchronous discussion virtually to substitute for in-person clinic visit. 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    Generalized anxiety disorder 10/22/2017    IFG (impaired fasting glucose) 10/22/2017    Psoriasis 10/22/2017    IBS (irritable bowel syndrome) 10/22/2017    History of bone density study      Overview Note:     4/2018 normal      Abnormal liver function 08/15/2017    Foot fracture, left 08/15/2017    Foot pain 08/15/2017    Altered bowel function 10/31/2016     Overview Note:     Updating Deprecated Diagnoses      Elevated blood pressure 10/15/2016    Essential (primary) hypertension 10/15/2016     Past Medical History:   Diagnosis Date    Acquired deviated nasal septum     Anal bleeding 10/31/2016    Ankle injury     Asthma     Benign paroxysmal positional vertigo     Derangement of medial meniscus     L knee mild, clinical diagnosis 9/19/12 after turning foot and popping sound 9/16/12 while walking and turn.     Eustachian tube dysfunction     Excessive sleepiness     During the day    Generalized anxiety disorder     H/O varicose veins History of sprain of foot     Osteoarthritis     Pain in joint of left knee     Palpitations     Psoriasis       Past Surgical History:   Procedure Laterality Date    APPENDECTOMY      BREAST SURGERY      lift and a left implant    Glennis Cockayne Dr. Margurette Foots    ENDOSCOPY, COLON, DIAGNOSTIC      KNEE ARTHROSCOPY Bilateral     [place] COLONOSCOPY FLX DX W/COLLJ SPEC WHEN PFRMD N/A 12/19/2016    Dr Nieves Aranda    SINUS SURGERY      nasal septoplasty/ maxillary antrostomies    [place]OMY      [place]     Current Outpatient Medications   Medication Sig Dispense Refill    albuterol sulfate HFA (VENTOLIN HFA) 108 (90 Base) MCG/ACT inhaler Inhale 2 puffs into the lungs 4 times daily as needed for Wheezing 18 g 0    predniSONE (DELTASONE) 10 MG tablet Take 1 tablet by mouth daily for 5 days 5 tablet 0    cetirizine (ZYRTEC) 10 MG tablet Take 10 mg by mouth daily      pantoprazole (PROTONIX) 40 MG tablet Take by mouth daily      busPIRone (BUSPAR) 15 MG tablet TAKE 1 TO 2 TABLETS BY MOUTH TWICE DAILY. 120 tablet 3    desvenlafaxine succinate (PRISTIQ) 100 MG TB24 extended release tablet TAKE 1 TABLET BY MOUTH ONCE DAILY 90 tablet 1    Multiple Vitamins-Minerals (VITEYES AREDS ADVANCED PO) Take by mouth daily (Patient not taking: Reported on 11/7/2022)      clobetasol (TEMOVATE) 0.05 % ointment Apply topically daily Apply topically daily. (Patient not taking: Reported on 11/7/2022) 30 g 0    fluticasone (FLONASE) 50 MCG/ACT nasal spray 2 sprays by Each Nostril route daily (Patient not taking: Reported on 11/7/2022) 1 Bottle 0    albuterol sulfate HFA (VENTOLIN HFA) 108 (90 Base) MCG/ACT inhaler Inhale 2 puffs into the lungs every 6 hours as needed for Wheezing (Patient not taking: Reported on 11/7/2022) 1 Inhaler 3     No current facility-administered medications for this visit.      No Known Allergies  Social History     Tobacco Use Smoking status: Former     Years: 20.00     Types: Cigarettes     Start date:      Quit date:      Years since quittin.8    Smokeless tobacco: Never   Substance Use Topics    Alcohol use: Yes     Comment: occ      Family History   Problem Relation Age of Onset    Colon Cancer Maternal Grandmother 61    Colon Polyps Neg Hx     Esophageal Cancer Neg Hx     Liver Cancer Neg Hx     Liver Disease Neg Hx     Stomach Cancer Neg Hx     Rectal Cancer Neg Hx        Review of Systems   Constitutional:  Positive for fatigue. Negative for chills and fever. HENT:  Positive for congestion, rhinorrhea, sinus pressure and sinus pain. Negative for ear pain, nosebleeds, postnasal drip and sore throat. Respiratory:  Positive for cough and wheezing. Negative for chest tightness. Cardiovascular:  Negative for chest pain, palpitations and leg swelling. Gastrointestinal:  Negative for abdominal pain and constipation. Genitourinary:  Negative for dysuria and urgency. Musculoskeletal: Negative. Negative for arthralgias. Skin:  Negative for rash. Neurological:  Negative for dizziness and headaches. Psychiatric/Behavioral: Negative. Vitals:    22 1257   BP: 118/80   Pulse: (!) 110   Temp: 98.8 °F (37.1 °C)   SpO2: 98%   Weight: 208 lb 6.4 oz (94.5 kg)   Height: 5' 8\" (1.727 m)     Body mass index is 31.69 kg/m². Physical Exam  Constitutional:       Appearance: She is well-developed. HENT:      Right Ear: External ear normal.      Left Ear: External ear normal.      Mouth/Throat:      Pharynx: No oropharyngeal exudate. Eyes:      Conjunctiva/sclera: Conjunctivae normal.      Pupils: Pupils are equal, round, and reactive to light. Neck:      Thyroid: No thyromegaly. Vascular: No JVD. Cardiovascular:      Rate and Rhythm: Normal rate. Heart sounds: Normal heart sounds. No murmur heard. Pulmonary:      Effort: No respiratory distress. Breath sounds: Rhonchi present.  No wheezing or rales. Comments: Few wheezes  Chest:      Chest wall: No tenderness. Abdominal:      General: Bowel sounds are normal.      Palpations: Abdomen is soft. Musculoskeletal:         General: Normal range of motion. Cervical back: Neck supple. Lymphadenopathy:      Cervical: No cervical adenopathy. Skin:     General: Skin is warm. Findings: No rash. Neurological:      Mental Status: She is oriented to person, place, and time. Lab Review   No visits with results within 2 Month(s) from this visit. Latest known visit with results is:   Office Visit on 06/22/2022   Component Date Value    Strep A Ag 06/22/2022 None Detected            ASSESSMENT/PLAN:    Acute bronchitis and bronchiolitis    Chest congestion    Body aches    Sore throat    Subacute cough    Obtain  Orders Placed This Encounter   Procedures    POCT Influenza A/B   = negative    RX    -     predniSONE (DELTASONE) 10 MG tablet; Take 1 tablet by mouth daily for 5 days  -     cefUROXime (CEFTIN) 500 MG tablet; Take 1 tablet by mouth 2 times daily for 7 days    Take Mucinex 600 twice daily  Increase fluid intake  If sx not improving and resolving , if sx continue or re-occur pt has been instructed to call us and / or return here for follow- up evaluation          Orders Placed This Encounter   Procedures    POCT Influenza A/B       New Prescriptions    ALBUTEROL SULFATE HFA (VENTOLIN HFA) 108 (90 BASE) MCG/ACT INHALER    Inhale 2 puffs into the lungs 4 times daily as needed for Wheezing    PREDNISONE (DELTASONE) 10 MG TABLET    Take 1 tablet by mouth daily for 5 days         No follow-ups on file. There are no Patient Instructions on file for this visit. EMR Dragon/transcription disclaimer:Significant part of this  encounter note is electronic transcription/translationof spoken language to printed text.  The electronic translation of spoken language may be erroneous, or at times, nonsensical words or phrases may be inadvertently transcribed.  Although I have reviewed the note for sucherrors, some may still exist.

## 2022-11-09 RX ORDER — ALBUTEROL SULFATE 90 UG/1
2 AEROSOL, METERED RESPIRATORY (INHALATION) 4 TIMES DAILY PRN
Qty: 18 G | Refills: 0 | Status: SHIPPED
Start: 2022-11-09 | End: 2022-11-18 | Stop reason: CLARIF

## 2022-11-17 DIAGNOSIS — I10 ESSENTIAL (PRIMARY) HYPERTENSION: ICD-10-CM

## 2022-11-17 DIAGNOSIS — E66.09 EXOGENOUS OBESITY: ICD-10-CM

## 2022-11-17 DIAGNOSIS — E55.9 VITAMIN D DEFICIENCY: ICD-10-CM

## 2022-11-17 DIAGNOSIS — E78.2 MIXED HYPERLIPIDEMIA: ICD-10-CM

## 2022-11-17 DIAGNOSIS — Z00.00 ANNUAL PHYSICAL EXAM: ICD-10-CM

## 2022-11-17 DIAGNOSIS — Z23 NEED FOR IMMUNIZATION AGAINST INFLUENZA: ICD-10-CM

## 2022-11-17 DIAGNOSIS — R73.01 IFG (IMPAIRED FASTING GLUCOSE): ICD-10-CM

## 2022-11-17 DIAGNOSIS — F41.1 GENERALIZED ANXIETY DISORDER: ICD-10-CM

## 2022-11-17 LAB
BILIRUBIN URINE: NEGATIVE
BLOOD, URINE: NEGATIVE
CHOLESTEROL, TOTAL: 228 MG/DL (ref 160–199)
CLARITY: CLEAR
COLOR: YELLOW
GLUCOSE URINE: NEGATIVE MG/DL
HBA1C MFR BLD: 5.6 % (ref 4–6)
HDLC SERPL-MCNC: 60 MG/DL (ref 65–121)
KETONES, URINE: NEGATIVE MG/DL
LDL CHOLESTEROL CALCULATED: 145 MG/DL
LEUKOCYTE ESTERASE, URINE: NEGATIVE
NITRITE, URINE: NEGATIVE
PH UA: 7.5 (ref 5–8)
PROTEIN UA: NEGATIVE MG/DL
SPECIFIC GRAVITY UA: 1.02 (ref 1–1.03)
TRIGL SERPL-MCNC: 114 MG/DL (ref 0–149)
TSH SERPL DL<=0.05 MIU/L-ACNC: 1.35 UIU/ML (ref 0.27–4.2)
UROBILINOGEN, URINE: 1 E.U./DL
VITAMIN D 25-HYDROXY: 50.7 NG/ML

## 2022-11-18 ENCOUNTER — OFFICE VISIT (OUTPATIENT)
Dept: INTERNAL MEDICINE | Age: 52
End: 2022-11-18

## 2022-11-18 VITALS
DIASTOLIC BLOOD PRESSURE: 78 MMHG | BODY MASS INDEX: 32.13 KG/M2 | SYSTOLIC BLOOD PRESSURE: 108 MMHG | WEIGHT: 212 LBS | HEART RATE: 78 BPM | HEIGHT: 68 IN | RESPIRATION RATE: 18 BRPM | OXYGEN SATURATION: 97 %

## 2022-11-18 DIAGNOSIS — E78.2 MIXED HYPERLIPIDEMIA: ICD-10-CM

## 2022-11-18 DIAGNOSIS — Z00.00 ANNUAL PHYSICAL EXAM: ICD-10-CM

## 2022-11-18 DIAGNOSIS — I10 ESSENTIAL (PRIMARY) HYPERTENSION: ICD-10-CM

## 2022-11-18 DIAGNOSIS — E66.09 EXOGENOUS OBESITY: ICD-10-CM

## 2022-11-18 DIAGNOSIS — F41.1 GENERALIZED ANXIETY DISORDER: ICD-10-CM

## 2022-11-18 DIAGNOSIS — Z12.31 ENCOUNTER FOR SCREENING MAMMOGRAM FOR BREAST CANCER: Primary | ICD-10-CM

## 2022-11-18 DIAGNOSIS — R73.01 IFG (IMPAIRED FASTING GLUCOSE): ICD-10-CM

## 2022-11-18 RX ORDER — PHENTERMINE HYDROCHLORIDE 37.5 MG/1
37.5 TABLET ORAL
Qty: 30 TABLET | Refills: 1 | Status: SHIPPED | OUTPATIENT
Start: 2022-11-18 | End: 2022-12-18

## 2022-11-18 ASSESSMENT — ENCOUNTER SYMPTOMS
CONSTIPATION: 0
ABDOMINAL PAIN: 0
SORE THROAT: 0
COUGH: 1
WHEEZING: 0
CHEST TIGHTNESS: 0

## 2022-11-18 NOTE — PROGRESS NOTES
Chief Complaint:   Leonardo Pitts is a 45 y.o. female who presents forcomplete physical exam.    History of Present Illness:      Leonardo Pitts is a 45 y.o. female who presents todayfor wellness visit AND follow up on her chronic medical conditions as noted below. 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    Generalized anxiety disorder 10/22/2017    IFG (impaired fasting glucose) 10/22/2017    Psoriasis 10/22/2017    IBS (irritable bowel syndrome) 10/22/2017    History of bone density study      4/2018 normal      Abnormal liver function 08/15/2017    Foot fracture, left 08/15/2017    Foot pain 08/15/2017    Altered bowel function 10/31/2016     Updating Deprecated Diagnoses      Elevated blood pressure 10/15/2016    Essential (primary) hypertension 10/15/2016       Past Medical History:   Diagnosis Date    Acquired deviated nasal septum     Anal bleeding 10/31/2016    Ankle injury     Asthma     Benign paroxysmal positional vertigo     Derangement of medial meniscus     L knee mild, clinical diagnosis 9/19/12 after turning foot and popping sound 9/16/12 while walking and turn.     Eustachian tube dysfunction     Excessive sleepiness     During the day    Generalized anxiety disorder     H/O varicose veins     History of sprain of foot     Osteoarthritis     Pain in joint of left knee     Palpitations     Psoriasis        Past Surgical History:   Procedure Laterality Date    APPENDECTOMY      BREAST SURGERY      lift and a left implant    Mami Rolon    ENDOSCOPY, COLON, DIAGNOSTIC      KNEE ARTHROSCOPY Bilateral     [place] COLONOSCOPY FLX DX W/COLLJ SPEC WHEN PFRMD N/A 12/19/2016    Dr Radha Metcalf    SINUS SURGERY      nasal septoplasty/ maxillary antrostomies    TONSILLECTOMY      UPPER GASTROINTESTINAL ENDOSCOPY     Dr. Ermalene Alpers       Current Outpatient Medications   Medication Sig Dispense Refill    phentermine (ADIPEX-P) 37.5 MG tablet Take 1 tablet by mouth every morning (before breakfast) for 30 days. 30 tablet 1    cetirizine (ZYRTEC) 10 MG tablet Take 10 mg by mouth daily      busPIRone (BUSPAR) 15 MG tablet TAKE 1 TO 2 TABLETS BY MOUTH TWICE DAILY. 120 tablet 3    desvenlafaxine succinate (PRISTIQ) 100 MG TB24 extended release tablet TAKE 1 TABLET BY MOUTH ONCE DAILY 90 tablet 1    albuterol sulfate HFA (VENTOLIN HFA) 108 (90 Base) MCG/ACT inhaler Inhale 2 puffs into the lungs every 6 hours as needed for Wheezing 1 Inhaler 3    pantoprazole (PROTONIX) 40 MG tablet Take by mouth daily      clobetasol (TEMOVATE) 0.05 % ointment Apply topically daily Apply topically daily. (Patient not taking: Reported on 2022) 30 g 0    fluticasone (FLONASE) 50 MCG/ACT nasal spray 2 sprays by Each Nostril route daily (Patient not taking: No sig reported) 1 Bottle 0     No current facility-administered medications for this visit. No Known Allergies    Social History     Socioeconomic History    Marital status:       Spouse name: None    Number of children: None    Years of education: None    Highest education level: None   Tobacco Use    Smoking status: Former     Years: 20.00     Types: Cigarettes     Start date:      Quit date:      Years since quittin.8    Smokeless tobacco: Never   Vaping Use    Vaping Use: Never used   Substance and Sexual Activity    Alcohol use: Yes     Comment: occ    Drug use: No     Social Determinants of Health     Financial Resource Strain: Low Risk     Difficulty of Paying Living Expenses: Not hard at all   Food Insecurity: No Food Insecurity    Worried About Running Out of Food in the Last Year: Never true    Ran Out of Food in the Last Year: Never true     Family History   Problem Relation Age of Onset    Colon Cancer Maternal Grandmother 60    Colon Polyps Neg Hx Esophageal Cancer Neg Hx     Liver Cancer Neg Hx     Liver Disease Neg Hx     Stomach Cancer Neg Hx     Rectal Cancer Neg Hx           Past Surgical History:   Procedure Laterality Date    APPENDECTOMY      BREAST SURGERY      lift and a left implant    Santiago Maxim    Dr. David Johnson    ENDOSCOPY, COLON, DIAGNOSTIC      KNEE ARTHROSCOPY Bilateral     [place] COLONOSCOPY FLX DX W/COLLJ SPEC WHEN PFRMD N/A 12/19/2016    Dr Mami Causey    SINUS SURGERY      nasal septoplasty/ maxillary antrostomies    TONSILLECTOMY      Mika Johnson         Lab Review   Orders Only on 11/17/2022   Component Date Value    Vit D, 25-Hydroxy 11/17/2022 50.7     TSH 11/17/2022 1.350     Color, UA 11/17/2022 YELLOW     Clarity, UA 11/17/2022 Clear     Glucose, Ur 11/17/2022 Negative     Bilirubin Urine 11/17/2022 Negative     Ketones, Urine 11/17/2022 Negative     Specific Gravity, UA 11/17/2022 1.017     Blood, Urine 11/17/2022 Negative     pH, UA 11/17/2022 7.5     Protein, UA 11/17/2022 Negative     Urobilinogen, Urine 11/17/2022 1.0     Nitrite, Urine 11/17/2022 Negative     Leukocyte Esterase, Urine 11/17/2022 Negative     Cholesterol, Total 11/17/2022 228 (A)     Triglycerides 11/17/2022 114     HDL 11/17/2022 60 (A)     LDL Calculated 11/17/2022 145     Hemoglobin A1C 11/17/2022 5.6    Office Visit on 11/07/2022   Component Date Value    Influenza A Ab 11/07/2022 pos     Influenza B Ab 11/07/2022 Neg          Review of Systems   Constitutional:  Positive for fatigue. Negative for chills and fever. HENT:  Negative for congestion, ear pain, nosebleeds, postnasal drip and sore throat. Respiratory:  Positive for cough. Negative for chest tightness and wheezing. Cardiovascular:  Negative for chest pain, palpitations and leg swelling. Gastrointestinal:  Negative for abdominal pain and constipation.    Genitourinary:  Negative for dysuria and urgency. Musculoskeletal: Negative. Negative for arthralgias. Skin:  Negative for rash. Neurological:  Negative for dizziness and headaches. Psychiatric/Behavioral: Negative. Vitals:    11/18/22 0823   BP: 108/78   Site: Left Upper Arm   Position: Sitting   Cuff Size: Large Adult   Pulse: 78   Resp: 18   SpO2: 97%   Weight: 212 lb (96.2 kg)   Height: 5' 8\" (1.727 m)      Wt Readings from Last 3 Encounters:   11/18/22 212 lb (96.2 kg)   11/07/22 208 lb 6.4 oz (94.5 kg)   11/02/22 215 lb 9.6 oz (97.8 kg)   Body mass index is 32.23 kg/m². BP Readings from Last 3 Encounters:   11/18/22 108/78   11/07/22 118/80   11/02/22 112/72       Physical Exam  Constitutional:       Appearance: She is well-developed. HENT:      Right Ear: External ear normal.      Left Ear: External ear normal.      Mouth/Throat:      Pharynx: No oropharyngeal exudate. Eyes:      Conjunctiva/sclera: Conjunctivae normal.      Pupils: Pupils are equal, round, and reactive to light. Neck:      Thyroid: No thyromegaly. Vascular: No JVD. Cardiovascular:      Rate and Rhythm: Normal rate. Heart sounds: Normal heart sounds. No murmur heard. Pulmonary:      Effort: No respiratory distress. Breath sounds: Normal breath sounds. No wheezing or rales. Chest:      Chest wall: No tenderness. Abdominal:      General: Bowel sounds are normal.      Palpations: Abdomen is soft. Musculoskeletal:      Cervical back: Neck supple. Lymphadenopathy:      Cervical: No cervical adenopathy. Skin:     Findings: No rash.          ASSESSMENT/PLAN  ANNUAL PHYSICAL  * Pap and pelvic per GYN-dr linn  * Visit for screening mammogram- schedule  * Colonoscopy 2016/ repeat 5 yrs- scheduled for 12/2022  * Bone density 4/18     Essential (primary) hypertension-her blood pressure has been in good range, at this time no prescription is needed, patient will continue to monitor this closely    Hyperlipidemia  LDL elevatd 145  Higher compared to prior  Healthy, mostly fiber rich nonstarchy plant-based diet recommended  Recommend to decrease intake of processed foods, simple carbohydrates and animal-based products that high in saturated fats        Obesity- bmi 30.87  Now on diet  IFG a1c 5.6 in 11/2022  rx phentermin  Diet and weight loss DW in length with patient. I have advised patient to follow strict lower carbohydrate dietary regimen and engage in  exercise routine for 30-45 minutes at least 3-4  Days per week. Patient was provided with strategies how to  shift from personal maladaptive eating patterns toward healthful eating and exercise. . Behavioral therapy components of self monitoring, goal setting, stimulus control and social support were emphasized. Along with above, I am prescribing this patient  Phentermin (  Her BMI is above 30)  While staying on this short term Phentermin treatment regimen, the goal is to loose at least 1 lbs  of weight per week. 2 month follow up appointment is recommended. Cheilitis  Better  Suggest Nilam Hartmann for lips    Orders Placed This Encounter   Procedures    ALPESH DIGITAL SCREEN W OR WO CAD BILATERAL    Comprehensive Metabolic Panel    Hemoglobin A1C    Lipid Panel     New Prescriptions    No medications on file      There are no Patient Instructions on file for this visit. No follow-ups on file. EMR Dragon/transcription disclaimer:Significant part of this  encounter note is electronic transcription/translation of spoken language to printed text. The electronic translation of spoken language may beerroneous, or at times, nonsensical words or phrases may be inadvertently transcribed.  Although I have reviewed the note for such errors, some may still exist.

## 2022-11-21 ENCOUNTER — TRANSCRIBE ORDERS (OUTPATIENT)
Dept: ADMINISTRATIVE | Facility: HOSPITAL | Age: 52
End: 2022-11-21

## 2022-11-21 DIAGNOSIS — Z12.31 ENCOUNTER FOR SCREENING MAMMOGRAM FOR MALIGNANT NEOPLASM OF BREAST: Primary | ICD-10-CM

## 2022-11-21 DIAGNOSIS — Z12.31 ENCOUNTER FOR SCREENING MAMMOGRAM FOR BREAST CANCER: ICD-10-CM

## 2022-11-28 RX ORDER — DESVENLAFAXINE 100 MG/1
TABLET, EXTENDED RELEASE ORAL
Qty: 90 TABLET | Refills: 1 | Status: SHIPPED | OUTPATIENT
Start: 2022-11-28

## 2022-11-28 NOTE — TELEPHONE ENCOUNTER
Last Appointment Date: 11/18/2022  Next Appointment Date: 5/18/2023    No Known Allergies    Patient needs refill on   Requested Prescriptions     Pending Prescriptions Disp Refills    desvenlafaxine succinate (PRISTIQ) 100 MG TB24 extended release tablet [Pharmacy Med Name: DESVENLAFAXINE ER SUCCINATE 100MG T] 90 tablet 1     Sig: TAKE 1 TABLET BY MOUTH ONCE DAILY

## 2022-12-12 ENCOUNTER — ANESTHESIA (OUTPATIENT)
Dept: ENDOSCOPY | Age: 52
End: 2022-12-12
Payer: COMMERCIAL

## 2022-12-12 ENCOUNTER — ANESTHESIA EVENT (OUTPATIENT)
Dept: ENDOSCOPY | Age: 52
End: 2022-12-12
Payer: COMMERCIAL

## 2022-12-12 ENCOUNTER — HOSPITAL ENCOUNTER (OUTPATIENT)
Age: 52
Setting detail: OUTPATIENT SURGERY
Discharge: HOME | End: 2022-12-12
Attending: INTERNAL MEDICINE | Admitting: INTERNAL MEDICINE
Payer: COMMERCIAL

## 2022-12-12 ENCOUNTER — TELEPHONE (OUTPATIENT)
Dept: GASTROENTEROLOGY | Age: 52
End: 2022-12-12

## 2022-12-12 VITALS
TEMPERATURE: 96.4 F | RESPIRATION RATE: 16 BRPM | DIASTOLIC BLOOD PRESSURE: 84 MMHG | BODY MASS INDEX: 32.13 KG/M2 | SYSTOLIC BLOOD PRESSURE: 144 MMHG | WEIGHT: 212 LBS | HEART RATE: 72 BPM | HEIGHT: 68 IN | OXYGEN SATURATION: 100 %

## 2022-12-12 DIAGNOSIS — K92.1 BLOODY STOOLS: ICD-10-CM

## 2022-12-12 DIAGNOSIS — R10.13 EPIGASTRIC PAIN: ICD-10-CM

## 2022-12-12 PROCEDURE — 3609010600 HC COLONOSCOPY POLYPECTOMY SNARE/COLD BIOPSY: Performed by: INTERNAL MEDICINE

## 2022-12-12 PROCEDURE — 88305 TISSUE EXAM BY PATHOLOGIST: CPT

## 2022-12-12 PROCEDURE — 3700000000 HC ANESTHESIA ATTENDED CARE: Performed by: INTERNAL MEDICINE

## 2022-12-12 PROCEDURE — 7100000011 HC PHASE II RECOVERY - ADDTL 15 MIN: Performed by: INTERNAL MEDICINE

## 2022-12-12 PROCEDURE — 3609012400 HC EGD TRANSORAL BIOPSY SINGLE/MULTIPLE: Performed by: INTERNAL MEDICINE

## 2022-12-12 PROCEDURE — 2580000003 HC RX 258: Performed by: INTERNAL MEDICINE

## 2022-12-12 PROCEDURE — 7100000010 HC PHASE II RECOVERY - FIRST 15 MIN: Performed by: INTERNAL MEDICINE

## 2022-12-12 PROCEDURE — 6360000002 HC RX W HCPCS: Performed by: NURSE ANESTHETIST, CERTIFIED REGISTERED

## 2022-12-12 PROCEDURE — 2500000003 HC RX 250 WO HCPCS: Performed by: NURSE ANESTHETIST, CERTIFIED REGISTERED

## 2022-12-12 PROCEDURE — 2709999900 HC NON-CHARGEABLE SUPPLY: Performed by: INTERNAL MEDICINE

## 2022-12-12 PROCEDURE — 3700000001 HC ADD 15 MINUTES (ANESTHESIA): Performed by: INTERNAL MEDICINE

## 2022-12-12 RX ORDER — ONDANSETRON 4 MG/1
TABLET, FILM COATED ORAL
Qty: 6 TABLET | Refills: 0 | Status: SHIPPED | OUTPATIENT
Start: 2022-12-12

## 2022-12-12 RX ORDER — ONDANSETRON 2 MG/ML
INJECTION INTRAMUSCULAR; INTRAVENOUS PRN
Status: DISCONTINUED | OUTPATIENT
Start: 2022-12-12 | End: 2022-12-12 | Stop reason: SDUPTHER

## 2022-12-12 RX ORDER — SODIUM CHLORIDE, SODIUM LACTATE, POTASSIUM CHLORIDE, CALCIUM CHLORIDE 600; 310; 30; 20 MG/100ML; MG/100ML; MG/100ML; MG/100ML
INJECTION, SOLUTION INTRAVENOUS CONTINUOUS
Status: DISCONTINUED | OUTPATIENT
Start: 2022-12-12 | End: 2022-12-12 | Stop reason: HOSPADM

## 2022-12-12 RX ORDER — PANTOPRAZOLE SODIUM 40 MG/1
40 TABLET, DELAYED RELEASE ORAL DAILY
Qty: 30 TABLET | Refills: 5 | Status: SHIPPED | OUTPATIENT
Start: 2022-12-12

## 2022-12-12 RX ORDER — MIDAZOLAM HYDROCHLORIDE 1 MG/ML
INJECTION INTRAMUSCULAR; INTRAVENOUS PRN
Status: DISCONTINUED | OUTPATIENT
Start: 2022-12-12 | End: 2022-12-12 | Stop reason: SDUPTHER

## 2022-12-12 RX ORDER — PROPOFOL 10 MG/ML
INJECTION, EMULSION INTRAVENOUS PRN
Status: DISCONTINUED | OUTPATIENT
Start: 2022-12-12 | End: 2022-12-12 | Stop reason: SDUPTHER

## 2022-12-12 RX ORDER — LIDOCAINE HYDROCHLORIDE 10 MG/ML
INJECTION, SOLUTION INFILTRATION; PERINEURAL PRN
Status: DISCONTINUED | OUTPATIENT
Start: 2022-12-12 | End: 2022-12-12 | Stop reason: SDUPTHER

## 2022-12-12 RX ADMIN — LIDOCAINE HYDROCHLORIDE 40 MG: 10 INJECTION, SOLUTION INFILTRATION; PERINEURAL at 08:33

## 2022-12-12 RX ADMIN — PROPOFOL 400 MG: 10 INJECTION, EMULSION INTRAVENOUS at 08:34

## 2022-12-12 RX ADMIN — SODIUM CHLORIDE, POTASSIUM CHLORIDE, SODIUM LACTATE AND CALCIUM CHLORIDE: 600; 310; 30; 20 INJECTION, SOLUTION INTRAVENOUS at 07:55

## 2022-12-12 RX ADMIN — ONDANSETRON 4 MG: 2 INJECTION INTRAMUSCULAR; INTRAVENOUS at 08:30

## 2022-12-12 RX ADMIN — MIDAZOLAM 2 MG: 1 INJECTION INTRAMUSCULAR; INTRAVENOUS at 08:30

## 2022-12-12 ASSESSMENT — PAIN - FUNCTIONAL ASSESSMENT: PAIN_FUNCTIONAL_ASSESSMENT: 0-10

## 2022-12-12 NOTE — TELEPHONE ENCOUNTER
[75:02 AM] Tomasa Blanca. .. Nash Worleyr for [facility] OP Dept called stating MRN:  [id number] needs Rx on her Protonix please.  TY      Order Placed and pended to Dr Arnold Norwood,

## 2022-12-12 NOTE — DISCHARGE INSTRUCTIONS
1. Await path results, the patient will be contacted in 7-10 days with biopsy results. 2.  Small frequent meals and follow anti-GERD measures along with PPI at least once daily      3. Repeat colonoscopy: If feasible, tomorrow or  later this week or next week if okay with patient with a strict 2-day clear liquid diet and a 2-day prep using Plenvu or a similar solution.        - Resume previous meds and diet  - GI clinic f/u 6 weeks with    - Keep scheduled f/u appts with other MDs     - NO ASA/NSAIDs x 2 weeks      Upper GI Endoscopy: What to Expect at [address] had an upper GI endoscopy. Your doctor used a thin, lighted tube that bends to look at the inside of your esophagus, your stomach, and the first part of the small intestine, called the duodenum. After you have an endoscopy, you will stay at the hospital or clinic for 1 to 2 hours. This will allow the medicine to wear off. You will be able to go home after your doctor or nurse checks to make sure that you're not having any problems. You may have to stay overnight if you had treatment during the test. You may have a sore throat for a day or two after the test.  This care sheet gives you a general idea about what to expect after the test.  How can you care for yourself at home? Activity   Rest as much as you need to after you go home. You should be able to go back to your usual activities the day after the test.  Diet   Follow your doctor's directions for eating after the test.  Drink plenty of fluids (unless your doctor has told you not to). Medications   If you have a sore throat the day after the test, use an over-the-counter spray to numb your throat. Follow-up care is a key part of your treatment and safety. Be sure to make and go to all appointments, and call your doctor if you are having problems. It's also a good idea to know your test results and keep a list of the medicines you take. When should you call for help? Call 911 anytime you think you may need emergency care. For example, call if:    You passed out (lost consciousness). You have trouble breathing. You pass maroon or bloody stools. Call your doctor now or seek immediate medical care if:    You have pain that does not get better after your take pain medicine. You have new or worse belly pain. You have blood in your stools. You are sick to your stomach and cannot keep fluids down. You have a fever. You cannot pass stools or gas. Watch closely for changes in your health, and be sure to contact your doctor if:    Your throat still hurts after a day or two. You do not get better as expected. Where can you learn more? Go to http://www.woods.com/ and enter J454 to learn more about \"Upper GI Endoscopy: What to Expect at Home. \"  Current as of: June 6, 2022               Content Version: 13.5  © 2006-2022 [employer]. Care instructions adapted under license by [facility] ([facility]). If you have questions about a medical condition or this instruction, always ask your healthcare professional. [place] [address] any warranty or liability for your use of this information. Colonoscopy: What to Expect at [address] [place] [address]  After a colonoscopy, you'll stay at the clinic until you wake up. Then you can go home. But you'll need to arrange for a ride. Your doctor will tell you when you can eat and do your other usual activities. Your doctor will talk to you about when you'll need your next colonoscopy. Your doctor can help you decide how often you need to be checked. This will depend on the results of your test and your risk for colorectal cancer. After the test, you may be bloated or have gas pains. You may need to pass gas. If a biopsy was done or a polyp was removed, you may have streaks of blood in your stool (feces) for a few days.  Problems such as heavy rectal bleeding may not occur until several weeks after the test. This isn't common. But it can happen after polyps are removed. This care sheet gives you a general idea about how long it will take for you to recover. But each person recovers at a different pace. Follow the steps below to get better as quickly as possible. How can you care for yourself at home? Activity    Rest when you feel tired. You can do your normal activities when it feels okay to do so. Diet    Follow your doctor's directions for eating. Unless your doctor has told you not to, drink plenty of fluids. This helps to replace the fluids that were lost during the colon prep. Do not drink alcohol. Medicines    Your doctor will tell you if and when you can restart your medicines. You will also be given instructions about taking any new medicines. If you stopped taking aspirin or some other blood thinner, your doctor will tell you when to start taking it again. If polyps were removed or a biopsy was done during the test, your doctor may tell you not to take aspirin or other anti-inflammatory medicines for a few days. These include ibuprofen (Advil, Motrin) and naproxen (Aleve). Other instructions    For your safety, do not drive or operate machinery until the medicine wears off and you can think clearly. Your doctor may tell you not to drive or operate machinery until the day after your test.     Do not sign legal documents or make major decisions until the medicine wears off and you can think clearly. The anesthesia can make it hard for you to fully understand what you are agreeing to. Follow-up care is a key part of your treatment and safety. Be sure to make and go to all appointments, and call your doctor if you are having problems. It's also a good idea to know your test results and keep a list of the medicines you take. When should you call for help? Call 911 anytime you think you may need emergency care.  For example, call if:    You passed out (lost consciousness). You pass maroon or bloody stools. You have trouble breathing. Call your doctor now or seek immediate medical care if:    You have pain that does not get better after you take pain medicine. You are sick to your stomach or cannot drink fluids. You have new or worse belly pain. You have blood in your stools. You have a fever. You cannot pass stools or gas. Watch closely for changes in your health, and be sure to contact your doctor if you have any problems. Where can you learn more? Go to http://www.woods.com/ and enter E264 to learn more about \"Colonoscopy: What to Expect at Home. \"  Current as of: May 4, 2022               Content Version: 13.5  © 2006-2022 [employer]. Care instructions adapted under license by [facility] ([facility]). If you have questions about a medical condition or this instruction, always ask your healthcare professional. [place] [address] any warranty or liability for your use of this information.

## 2022-12-12 NOTE — ANESTHESIA PRE PROCEDURE
Department of Anesthesiology  Preprocedure Note       Name:  Dale Curran   Age:  45 y.o.  :  1970                                          MRN:  [id number]         Date:  2022      Surgeon: Va Olivera):  Justa Frank MD    Procedure: Procedure(s):  EGD BIOPSY  COLONOSCOPY DIAGNOSTIC    Medications prior to admission:   Prior to Admission medications    Medication Sig Start Date End Date Taking? Authorizing Provider   desvenlafaxine succinate (PRISTIQ) 100 MG TB24 extended release tablet TAKE 1 TABLET BY MOUTH ONCE DAILY 22   Vahe Ferris MD   phentermine (ADIPEX-P) 37.5 MG tablet Take 1 tablet by mouth every morning (before breakfast) for 30 days. 22  Vahe Ferris MD   cetirizine (ZYRTEC) 10 MG tablet Take 10 mg by mouth daily    Historical Provider, MD   pantoprazole (PROTONIX) 40 MG tablet Take by mouth daily 10/25/22   Historical Provider, MD   busPIRone (BUSPAR) 15 MG tablet TAKE 1 TO 2 TABLETS BY MOUTH TWICE DAILY.  22   Vahe Ferris MD   fluticasone [facility]) 50 MCG/ACT nasal spray 2 sprays by Each Nostril route daily 3/22/21   Vahe Ferris MD   albuterol sulfate HFA (VENTOLIN HFA) 108 (90 Base) MCG/ACT inhaler Inhale 2 puffs into the lungs every 6 hours as needed for Wheezing 3/18/20   Vahe Ferris MD       Current medications:    Current Facility-Administered Medications   Medication Dose Route Frequency Provider Last Rate Last Admin    lactated ringers infusion   IntraVENous Continuous Justa Frank  mL/hr at 22 0755 New Bag at 22 0755       Allergies:  No Known Allergies    Problem List:    Patient Active Problem List   Diagnosis Code    Altered bowel function R19.8    Elevated blood pressure [id number]    Abnormal liver function R94.5    Essential (primary) hypertension I10    Foot fracture, left S92.902A    Foot pain M79.673    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     Eustachian tube dysfunction     Excessive sleepiness     During the day    Generalized anxiety disorder     H/O varicose veins     History of sprain of foot     Osteoarthritis     Pain in joint of left knee     Palpitations     Psoriasis        Past Surgical History:        Procedure Laterality Date    APPENDECTOMY      BREAST SURGERY      lift and a left implant   Dory Pares Right    [place] Elkins    Dr. Jose Enrique Cano    ENDOSCOPY, COLON, DIAGNOSTIC      KNEE ARTHROSCOPY Bilateral     FL COLONOSCOPY FLX DX W/COLLJ SPEC WHEN PFRMD N/A 2016    Dr Lauryn Posadas    SINUS SURGERY      nasal septoplasty/ maxillary antrostomies    TONSILLECTOMY      Armand [place], [place]       Social History:    Social History     Tobacco Use    Smoking status: Former     Packs/day: 1.50     Years: 10.00     Pack years: 15.00     Types: Cigarettes     Start date:      Quit date:      Years since quittin.9    Smokeless tobacco: Never   Substance Use Topics    Alcohol use: Yes     Comment: occ                                Counseling given: Not Answered      Vital Signs (Current):   Vitals:    22 0740   BP: 133/76   Pulse: (!) 102   Resp: 22   Temp: 97.9 °F (36.6 °C)   TempSrc: Temporal   SpO2: 98%   Weight: 212 lb (96.2 kg)   Height: 5' 8\" (1.727 m)                                              BP Readings from Last 3 Encounters:   12/12/22 133/76   11/18/22 108/78   11/07/22 118/80       NPO Status: Time of last liquid consumption: 0330                        Time of last solid consumption: 1900                        Date of last liquid consumption: 12/12/22                        Date of last solid food consumption: 12/10/22    BMI:   Wt Readings from Last 3 Encounters:   12/12/22 212 lb (96.2 kg)   11/18/22 212 lb (96.2 kg)   11/07/22 208 lb 6.4 oz (94.5 kg)     Body mass index is 32.23 kg/m². CBC:   Lab Results   Component Value Date/Time    WBC 10.3 12/08/2021 05:09 PM    RBC 4.49 12/08/2021 05:09 PM    HGB 13.2 12/08/2021 05:09 PM    HCT 43.0 12/08/2021 05:09 PM    MCV 95.8 12/08/2021 05:09 PM    RDW 13.6 12/08/2021 05:09 PM     12/08/2021 05:09 PM       CMP:   Lab Results   Component Value Date/Time     12/01/2021 07:49 AM    K 3.9 12/01/2021 07:49 AM     12/01/2021 07:49 AM    CO2 26 12/01/2021 07:49 AM    BUN 17 12/01/2021 07:49 AM    CREATININE 0.6 12/01/2021 07:49 AM    GFRAA >59 12/01/2021 07:49 AM    LABGLOM >60 12/01/2021 07:49 AM    GLUCOSE 96 12/01/2021 07:49 AM    PROT 7.0 12/01/2021 07:49 AM    CALCIUM 9.2 12/01/2021 07:49 AM    BILITOT 0.4 12/01/2021 07:49 AM    ALKPHOS 81 12/01/2021 07:49 AM    AST 21 12/01/2021 07:49 AM    ALT 18 12/01/2021 07:49 AM       POC Tests: No results for input(s): POCGLU, POCNA, POCK, POCCL, POCBUN, POCHEMO, POCHCT in the last 72 hours.     Coags: No results found for: PROTIME, INR, APTT    HCG (If Applicable): No results found for: PREGTESTUR, PREGSERUM, HCG, HCGQUANT     ABGs: No results found for: PHART, PO2ART, GHW3KOD, IOP1HJM, BEART, W9CSBFXG     Type & Screen (If Applicable):  No results found for: LABABO, LABRH    Drug/Infectious Status (If Applicable):  No results found for: HIV, HEPCAB    COVID-19 Screening (If Applicable):   Lab Results   Component Value Date/Time    COVID19 DETECTED 12/17/2021 12:24 PM           Anesthesia Evaluation  Patient summary reviewed no history of anesthetic complications:   Airway: Mallampati: II  TM distance: >3 FB   Neck ROM: full  Mouth opening: < 3 FB   Dental: normal exam         Pulmonary:normal exam    (+) asthma:                           ROS comment: Former smoker- quit 1996   Cardiovascular:  Exercise tolerance: good (>4 METS),   (+) hypertension:, hyperlipidemia         Beta Blocker:  Not on Beta Blocker         Neuro/Psych:   (+) depression/anxiety              ROS comment: CNS stimulant use GI/Hepatic/Renal:   (+) bowel prep,          ROS comment: etoh use per chart. Endo/Other:    (+) : arthritis: OA., .                 Abdominal:             Vascular: Other Findings:           Anesthesia Plan      general and TIVA     ASA 2       Induction: intravenous. Anesthetic plan and risks discussed with patient.                         MICHAEL Diego - CRNA   12/12/2022

## 2022-12-12 NOTE — TELEPHONE ENCOUNTER
Wendy called to schedule a  6 wk follow up . Scheduled 1st available on 2/2. If pt should be sooner please call pt to change appt. Please be advised that the best time to call her to accommodate their needs is Anytime. Thank you.

## 2022-12-12 NOTE — ANESTHESIA POSTPROCEDURE EVALUATION
Department of Anesthesiology  Postprocedure Note    Patient: Ubaldo Guerra  MRN: [id number]  YOB: 1970  Date of evaluation: 12/12/2022      Procedure Summary     Date: 12/12/22 Room / Location: [address]    Anesthesia Start: 4266 Anesthesia Stop:     Procedures:       EGD BIOPSY (Abdomen)      COLONOSCOPY POLYPECTOMY SNARE/COLD BIOPSY Diagnosis:       Epigastric pain      Bloody stools      (Epigastric pain [R10.13])      (Bloody stools [K92.1])    Surgeons: Merrill May MD Responsible Provider: MICHAEL Monroy CRNA    Anesthesia Type: general, TIVA ASA Status: 2          Anesthesia Type: No value filed.     Anastacia Phase I: Anastacia Score: 10    Anastacia Phase II:        Anesthesia Post Evaluation    Patient location during evaluation: bedside  Patient participation: complete - patient participated  Level of consciousness: sleepy but conscious  Pain score: 0  Airway patency: patent  Nausea & Vomiting: no nausea and no vomiting  Complications: no  Cardiovascular status: hemodynamically stable and blood pressure returned to baseline  Respiratory status: acceptable and nasal cannula  Hydration status: stable

## 2022-12-12 NOTE — OP NOTE
Patient: Óscar Collins : 1970  Med Rec#: [id number] Acc#: [id number]   Primary Care Provider Darryl Infante MD    Date of Procedure:  2022    Endoscopist: Elena Falcon MD, MD    Referring Provider: Darryl Infante MD,     Operation Performed: Colonoscopy up to the cecum with hot biopsy removal of a cecal polyp and random cold biopsies in the distal rectum    Indications: For both EGD and colonoscopy exams today:  1. Epigastric pain    2. Rectal bleeding    3. Nausea    Last EGD  - Dr. Marilyn Salgado   Last Colonoscopy  [facility] (Dr. Denis Griffith)  Family hx colon cancer - Maternal Grandmother    Anesthesia:  Sedation was administered by anesthesia who monitored the patient during the procedure. I met with Rosalba Miranda prior to procedure. We discussed the procedure itself, and I have discussed the risks of endoscopy (including-- but not limited to-- pain, discomfort, bleeding potentially requiring second endoscopic procedure and/or blood transfusion, organ perforation requiring operative repair, damage to organs near the colon, infection, aspiration, cardiopulmonary/allergic reaction), benefits, indications to endoscopy. Additionally, we discussed options other than colonoscopy. The patient expressed understanding. All questions answered. The patient decided to proceed with the procedure. Signed informed consent was placed on the chart. Blood Loss: minimal    Withdrawal time: More than 9 minutes  Bowel Prep: Fair to poor with thick solid and semisolid stool scattered in segments throughout the colon obscuring the underlying mucosa. Small lesions including polyps may have been missed. Prep quality was not adequate for a colorectal cancer screening or surveillance. Complications: no immediate complications    DESCRIPTION OF PROCEDURE:     A time out was performed. After written informed consent was obtained, the patient was placed in the left lateral position.      The perianal area was inspected, and a digital rectal exam was performed. A rectal exam was performed: normal tone, no palpable lesions. At this point, a forward viewing Olympus colonoscope was inserted into the anus and carefully advanced to the cecum with some difficulty requiring external abdominal pressure during parts of this procedure due to a redundant colon and suboptimal prep. The cecum was identified by the ileocecal valve and the appendiceal orifice. The colonoscope was then slowly withdrawn with careful inspection of the mucosa in a linear and circumferential fashion. The scope was retroflexed in the rectum. Suction was utilized during the procedure to remove as much air as possible from the bowel. The colonoscope was removed from the patient, and the procedure was terminated. Findings are listed below. Findings:   A 4 mm in diameter sessile benign-appearing cecal polyp was removed by hot biopsy. Small patch of erythema noted in the distal rectum-prep artifact versus rectal mucosal prolapse injury. Cold biopsies were taken for pathology. NO large polyps or masses or strictures or overt IBD. Suboptimal exam due to prep quality which was not adequate for a more thorough colorectal cancer screening as described above. Moderate diverticulosis in the left colon  Internal hemorrhoids-Grade 1  Where it was clearly visible, the mucosa appeared normal throughout the entire examined colon  Retroflexion in the rectum was otherwise normal and revealed no further abnormalities      Recommendations:  1. Repeat colonoscopy: If feasible, tomorrow or  later this week or next week if okay with patient with a strict 2-day clear liquid diet and a 2-day prep using Plenvu or a similar solution.       2. Await biopsy results-you will receive a letter with your results within 7-10 days.    - Resume previous meds and diet  - GI clinic f/u 6 weeks with    - Keep scheduled f/u appts with other MDs     - NO ASA/NSAIDs x 2 weeks     Findings and recommendations were discussed w/ the patient. A copy of the images was provided.     (Please note that portions of this note were completed with a voice recognition program. Efforts were made to edit the dictations but occasionally words may be mis-transcribed.)     Bambi Victoria MD, MD  12/12/2022  8:32 AM

## 2022-12-12 NOTE — OP NOTE
Endoscopic Procedure Note    Patient: Tsering Tyson : 1970  Med Rec#: [id number] Acc#: [id number]     Primary Care Provider Karthikeyan Salomon MD    Endoscopist: Rey Farley MD, MD    Date of Procedure:  2022    Procedure:   1. EGD with cold biopsies    Indications: For both EGD and colonoscopy exams today:  1. Epigastric pain    2. Rectal bleeding    3. Nausea    Last EGD  - Dr. Britton   Last Colonoscopy  [facility] (Dr. Johnson Speaker)  Family hx colon cancer - Maternal Grandmother  Anesthesia:  Sedation was administered by anesthesia who monitored the patient during the procedure. Estimated Blood Loss: minimal    Procedure:   After reviewing the patient's chart and obtaining informed consent, the patient was placed in the left lateral decubitus position. A forward-viewing Olympus endoscope was lubricated and inserted through the mouth into the oropharynx. Under direct visualization, the upper esophagus was intubated. The scope was advanced to the level of the third portion of duodenum. Scope was slowly withdrawn with careful inspection of the mucosal surfaces. The scope was retroflexed for inspection of the gastric fundus and incisura. Findings and maneuvers are listed in impression below. The patient tolerated the procedure well. The scope was removed. There were no immediate complications. Findings/IMPRESSION:  Esophagus: normal and EG junction at 38 cm also appeared normal.    NO erosions or ulcers or nodules or strictures or webs or rings or mass lesions or extrinsic compression or diverticula noted. There is a 2 to 3 cm in length small hiatal hernia present which may be a contributing factor to some of her epigastric pain. Stomach:  normal except for the small hiatal hernia which also was seen on the retroflexed exam.    NO ulcers or masses or gastric outlet obstruction or retained food or fluid. Rugae were normal and lumen distended well with insufflation. Retroflexed views otherwise revealed a normal GE junction, fundus and cardia as well. Duodenum: Normal. Random biopsies were taken to check for Celiac disease and other causes of villous atrophy. RECOMMENDATIONS:    1. Await path results, the patient will be contacted in 7-10 days with biopsy results. 2.  Small frequent meals and follow anti-GERD measures along with PPI at least once daily    The results were discussed with the patient and family. A copy of the images obtained were given to the patient.      (Please note that portions of this note were completed with a voice recognition program. Efforts were made to edit the dictations but occasionally words may be mis-transcribed.)     Danise Scheuermann, MD, MD  12/12/2022  8:33 AM

## 2022-12-12 NOTE — H&P
Patient Name: Lorie Dunham  : 1970  MRN: [id number]  DATE: 22    Allergies: No Known Allergies     ENDOSCOPY  History and Physical    Procedure:    [x] Diagnostic Colonoscopy       [] Screening Colonoscopy  [x] EGD      [] ERCP      [] EUS       [] Other    [x] Previous office notes/History and Physical reviewed from the patients chart. Please see EMR for further details of HPI. I have examined the patient's status immediately prior to the procedure and:      Indications/HPI: For both EGD and colonoscopy exams today:  1. Epigastric pain    2. Rectal bleeding    3. Nausea    Last EGD  - Dr. Rivas [facility]   Last Colon[facility]  [facility] (Dr. Sherryle Bucco)  Family hx colon cancer - Maternal Grandmother    []Abdominal Pain   []Cancer- GI/Lung     []Fhx of colon CA/polyps  []History of Polyps  []Barretts            []Melena  []Abnormal Imaging              []Dysphagia              []Persistent Pneumonia   []Anemia                            []Food Impaction        []History of Polyps  [] GI Bleed             []Pulmonary nodule/Mass   []Change in bowel habits []Heartburn/Reflux  []Rectal Bleed (BRBPR)  []Chest Pain - Non Cardiac []Heme (+) Stool []Ulcers  []Constipation  []Hemoptysis  []Varices  []Diarrhea  []Hypoxemia    []Nausea/Vomiting   []Screening   []Crohns/Colitis  []Other:     Anesthesia:   [x] MAC [] Moderate Sedation   [] General   [] None     ROS: 12 pt Review of Symptoms was negative unless mentioned above    Medications:   Prior to Admission medications    Medication Sig Start Date End Date Taking? Authorizing Provider   desvenlafaxine succinate (PRISTIQ) 100 MG TB24 extended release tablet TAKE 1 TABLET BY MOUTH ONCE DAILY 22   Gonsalo Dawson MD   phentermine (ADIPEX-P) 37.5 MG tablet Take 1 tablet by mouth every morning (before breakfast) for 30 days.  22  Gonsalo Dawson MD   cetirizine (ZYRTEC) 10 MG tablet Take 10 mg by mouth daily    Historical Provider, MD   pantoprazole (PROTONIX) 40 MG tablet Take by mouth daily 10/25/22   Historical Provider, MD   busPIRone (BUSPAR) 15 MG tablet TAKE 1 TO 2 TABLETS BY MOUTH TWICE DAILY. 22   Ralph Knapp MD   fluticasone [facility]) 50 MCG/ACT nasal spray 2 sprays by Each Nostril route daily 3/22/21   Ralph Knapp MD   albuterol sulfate HFA (VENTOLIN HFA) 108 (90 Base) MCG/ACT inhaler Inhale 2 puffs into the lungs every 6 hours as needed for Wheezing 3/18/20   Ralph Knapp MD       Past Medical History:  Past Medical History:   Diagnosis Date    Acquired deviated nasal septum     Anal bleeding 10/31/2016    Ankle injury     Asthma     Benign paroxysmal positional vertigo     Derangement of medial meniscus     L knee mild, clinical diagnosis 12 after turning foot and popping sound 12 while walking and turn.     Eustachian tube dysfunction     Excessive sleepiness     During the day    Generalized anxiety disorder     H/O varicose veins     History of sprain of foot     Osteoarthritis     Pain in joint of left knee     Palpitations     Psoriasis        Past Surgical History:  Past Surgical History:   Procedure Laterality Date    APPENDECTOMY      BREAST SURGERY      lift and a left implant    Billie Lerner    ENDOSCOPY, COLON, DIAGNOSTIC      KNEE ARTHROSCOPY Bilateral     MN COLONOSCOPY FLX DX W/COLLJ SPEC WHEN PFRMD N/A 2016    Dr Quinton Bob    SINUS SURGERY      nasal septoplasty/ maxillary antrostomies    TONSILLECTOMY      Chloé Mccoy, [address]       Social History:  Social History     Tobacco Use    Smoking status: Former     Packs/day: 1.50     Years: 10.00     Pack years: 15.00     Types: Cigarettes     Start date:      Quit date:      Years since quittin.9    Smokeless tobacco: Never   Vaping Use    Vaping Use: Never used   Substance Use Topics    Alcohol use: Yes     Comment: occ    Drug use: No       Vital Signs:   Vitals:    12/12/22 0740   BP: 133/76   Pulse: (!) 102   Resp: 22   Temp: 97.9 °F (36.6 °C)   SpO2: 98%        Physical Exam:  Cardiac:  [x]WNL  []Comments:  Pulmonary:  [x]WNL   []Comments:  Neuro/Mental Status:  [x]WNL  []Comments:  Abdominal:  [x]WNL    []Comments:  Other:   []WNL  []Comments:    Informed Consent:  The risks and benefits of the procedure have been discussed with either the patient or if they cannot consent, their representative. Assessment:  Patient examined and appropriate for planned sedation and procedure. Plan:  Proceed with planned sedation and procedure as above.          Stephani Marshall MD

## 2022-12-13 ENCOUNTER — HOSPITAL ENCOUNTER (OUTPATIENT)
Age: 52
Setting detail: OUTPATIENT SURGERY
Discharge: HOME | End: 2022-12-13
Attending: INTERNAL MEDICINE | Admitting: INTERNAL MEDICINE

## 2022-12-13 ENCOUNTER — ANESTHESIA EVENT (OUTPATIENT)
Dept: OPERATING ROOM | Age: 52
End: 2022-12-13

## 2022-12-13 ENCOUNTER — APPOINTMENT (OUTPATIENT)
Dept: OPERATING ROOM | Age: 52
End: 2022-12-13

## 2022-12-13 ENCOUNTER — ANESTHESIA (OUTPATIENT)
Dept: OPERATING ROOM | Age: 52
End: 2022-12-13

## 2022-12-13 VITALS
TEMPERATURE: 98.8 F | DIASTOLIC BLOOD PRESSURE: 72 MMHG | HEIGHT: 68 IN | OXYGEN SATURATION: 100 % | WEIGHT: 212 LBS | BODY MASS INDEX: 32.13 KG/M2 | SYSTOLIC BLOOD PRESSURE: 114 MMHG | HEART RATE: 92 BPM | RESPIRATION RATE: 18 BRPM

## 2022-12-13 PROCEDURE — G8907 PT DOC NO EVENTS ON DISCHARG: HCPCS

## 2022-12-13 PROCEDURE — G8918 PT W/O PREOP ORDER IV AB PRO: HCPCS

## 2022-12-13 PROCEDURE — 45380 COLONOSCOPY AND BIOPSY: CPT

## 2022-12-13 RX ORDER — SODIUM CHLORIDE, SODIUM LACTATE, POTASSIUM CHLORIDE, CALCIUM CHLORIDE 600; 310; 30; 20 MG/100ML; MG/100ML; MG/100ML; MG/100ML
INJECTION, SOLUTION INTRAVENOUS CONTINUOUS
Status: DISCONTINUED | OUTPATIENT
Start: 2022-12-13 | End: 2022-12-13 | Stop reason: HOSPADM

## 2022-12-13 RX ORDER — DIPHENHYDRAMINE HYDROCHLORIDE 50 MG/ML
12.5 INJECTION INTRAMUSCULAR; INTRAVENOUS
Status: CANCELLED | OUTPATIENT
Start: 2022-12-13 | End: 2022-12-14

## 2022-12-13 RX ORDER — PROPOFOL 10 MG/ML
INJECTION, EMULSION INTRAVENOUS PRN
Status: DISCONTINUED | OUTPATIENT
Start: 2022-12-13 | End: 2022-12-13 | Stop reason: SDUPTHER

## 2022-12-13 RX ORDER — LIDOCAINE HYDROCHLORIDE 10 MG/ML
INJECTION, SOLUTION EPIDURAL; INFILTRATION; INTRACAUDAL; PERINEURAL PRN
Status: DISCONTINUED | OUTPATIENT
Start: 2022-12-13 | End: 2022-12-13 | Stop reason: SDUPTHER

## 2022-12-13 RX ORDER — ONDANSETRON 2 MG/ML
4 INJECTION INTRAMUSCULAR; INTRAVENOUS
Status: CANCELLED | OUTPATIENT
Start: 2022-12-13 | End: 2022-12-14

## 2022-12-13 RX ADMIN — SODIUM CHLORIDE, SODIUM LACTATE, POTASSIUM CHLORIDE, CALCIUM CHLORIDE: 600; 310; 30; 20 INJECTION, SOLUTION INTRAVENOUS at 10:34

## 2022-12-13 RX ADMIN — LIDOCAINE HYDROCHLORIDE 30 MG: 10 INJECTION, SOLUTION EPIDURAL; INFILTRATION; INTRACAUDAL; PERINEURAL at 11:52

## 2022-12-13 RX ADMIN — PROPOFOL 450 MG: 10 INJECTION, EMULSION INTRAVENOUS at 11:52

## 2022-12-13 NOTE — ANESTHESIA POSTPROCEDURE EVALUATION
Department of Anesthesiology  Postprocedure Note    Patient: Leonardo Pitts  MRN: [id number]  YOB: 1970  Date of evaluation: 12/13/2022      Procedure Summary     Date: 12/13/22 Room / Location: [facility] 02 / [address]    Anesthesia Start: 0831 Anesthesia Stop: 8317    Procedure: COLONOSCOPY DIAGNOSTIC (Abdomen) Diagnosis:       Epigastric pain      Blood in stool      (EPIG PAIN, BLOOD IN STOOL)    Surgeons: Brandie Giordano MD Responsible Provider: MICHAEL Padilla CRNA    Anesthesia Type: General ASA Status: 2          Anesthesia Type: General    Anastacia Phase I:      Anastacia Phase II:        Anesthesia Post Evaluation    Patient location during evaluation: bedside  Patient participation: complete - patient participated  Level of consciousness: awake  Pain score: 0  Airway patency: patent  Nausea & Vomiting: no nausea and no vomiting  Complications: no  Cardiovascular status: hemodynamically stable  Respiratory status: acceptable, room air and spontaneous ventilation  Hydration status: euvolemic

## 2022-12-13 NOTE — DISCHARGE INSTRUCTIONS
1. Repeat colonoscopy: pending pathology -in 5 years based on the recent colonoscopy findings; sooner if her personal or family history as pertaining to colorectal cancer risk changes requiring an earlier exam or if the patient were to develop lower GI symptoms such as bleeding, abdominal pain, change in bowel habits or stool caliber or if the patient has anemia or unexplained weight loss in the future. 2. Await biopsy results-you will receive a letter with your results within 7-10 days    - Resume previous meds and diet  - GI clinic f/u 6 weeks with Ms. Donavan Treadwell scheduled f/u appts with other MDs     - NO ASA/NSAIDs x 2 weeks     NSAIDS Non-steroidal Anti-Inflammatory  You have been directed by your physician to avoid any NSAID's; the following medications are a list of those to avoid. If you think that you are taking any NSAID's notify your physician. Over The Counter  Advil                      Motrin  Nuprin                   Ibuprofen  Midol                     Aleve  Naproxen              Orudis  Aspirin                   Florence-Linch      Prescriptions and Generics    Cataflam              Relafen  Voltaren               Clinoril  Indocin                 Naproxen  Arthrotec              Lodine  Daypro                 Nalfon  Toradol                Ansaid  Feldene               Meclofenamate  Fenoprofen          Ponstel  Mobic                   Celebrex  Vioxx       POST-OP ORDERS: COLONOSCOPY:    1. Rest today. 2. DO NOT eat or drink until wide awake; eat your usual diet today in moderate amount only. 3. DO NOT drive today. 4. Call physician if you have severe pain, vomiting, fever, rectal bleeding or black bowel movements. 5.  If a biopsy was taken or a polyp removed, you should expect to hear results in about 21 days. If you have heard nothing from your physician by then, call the office for results. 6.  Discharge home when patient awake, vitals signs stable and tolerating liquids.

## 2022-12-13 NOTE — ANESTHESIA PRE PROCEDURE
Department of Anesthesiology  Preprocedure Note       Name:  Lorie Dunham   Age:  45 y.o.  :  1970                                          MRN:  [id number]         Date:  2022      Surgeon: Denae Joe):  Kim Nevarez MD    Procedure: Procedure(s):  COLONOSCOPY DIAGNOSTIC    Medications prior to admission:   Prior to Admission medications    Medication Sig Start Date End Date Taking? Authorizing Provider   ondansetron (ZOFRAN) 4 MG tablet TAKE 1 TABLET 30 MIN BEFORE EACH DOSE OF BOWEL PREP. THEN EVERY 6 HOURS A NEEDED 22   Kim Nevarez MD   pantoprazole (PROTONIX) 40 MG tablet Take 1 tablet by mouth daily 22   Kim Nevarez MD   desvenlafaxine succinate (PRISTIQ) 100 MG TB24 extended release tablet TAKE 1 TABLET BY MOUTH ONCE DAILY 22   Gonsalo Dawson MD   phentermine (ADIPEX-P) 37.5 MG tablet Take 1 tablet by mouth every morning (before breakfast) for 30 days. 22  Gonsalo Dawson MD   cetirizine (ZYRTEC) 10 MG tablet Take 10 mg by mouth daily    Historical Provider, MD   busPIRone (BUSPAR) 15 MG tablet TAKE 1 TO 2 TABLETS BY MOUTH TWICE DAILY. 22   Gonsalo Dawson MD   fluticasone Elvan Norderen) 50 MCG/ACT nasal spray 2 sprays by Each Nostril route daily 3/22/21   Gonsalo Dawson MD   albuterol sulfate HFA (VENTOLIN HFA) 108 (90 Base) MCG/ACT inhaler Inhale 2 puffs into the lungs every 6 hours as needed for Wheezing 3/18/20   Gonsalo Dawson MD       Current medications:    Current Outpatient Medications   Medication Sig Dispense Refill    ondansetron (ZOFRAN) 4 MG tablet TAKE 1 TABLET 30 MIN BEFORE EACH DOSE OF BOWEL PREP.  THEN EVERY 6 HOURS A NEEDED 6 tablet 0    pantoprazole (PROTONIX) 40 MG tablet Take 1 tablet by mouth daily 30 tablet 5    desvenlafaxine succinate (PRISTIQ) 100 MG TB24 extended release tablet TAKE 1 TABLET BY MOUTH ONCE DAILY 90 tablet 1    phentermine (ADIPEX-P) 37.5 MG tablet Take 1 tablet by mouth every morning (before breakfast) for 30 days. 30 tablet 1    cetirizine (ZYRTEC) 10 MG tablet Take 10 mg by mouth daily      busPIRone (BUSPAR) 15 MG tablet TAKE 1 TO 2 TABLETS BY MOUTH TWICE DAILY. 120 tablet 3    fluticasone (FLONASE) 50 MCG/ACT nasal spray 2 sprays by Each Nostril route daily 1 Bottle 0    albuterol sulfate HFA (VENTOLIN HFA) 108 (90 Base) MCG/ACT inhaler Inhale 2 puffs into the lungs every 6 hours as needed for Wheezing 1 Inhaler 3     No current facility-administered medications for this visit. 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     Eustachian tube dysfunction     Excessive sleepiness     During the day    Generalized anxiety disorder     H/O varicose veins     History of sprain of foot     Osteoarthritis     Pain in joint of left knee     Palpitations     Psoriasis        Past Surgical History:        Procedure Laterality Date    APPENDECTOMY      BREAST SURGERY      lift and a left implant   Malaika Peres [facility]    Pennie Rhodes N/A 2022     Brigid, jose f patch of erythema in distal rectum, Mod diverticulosis left colon, int hem Gr 1, Sub prep no adequate Repeat 22    ENDOSCOPY, COLON, DIAGNOSTIC      KNEE ARTHROSCOPY Bilateral     [place] COLONOSCOPY FLX DX W/COLLJ SPEC WHEN PFRMD N/A 2016    Dr Neal Lehman    SINUS SURGERY      nasal septoplasty/ maxillary antrostomies   Aaliyah Porter, [place]    UPPER GASTROINTESTINAL ENDOSCOPY N/A 2022    Dr Atiya Salmon [facility]        Social History:    Social History     Tobacco Use    Smoking status: Former     Packs/day: 1.50     Years: 10.00     Pack years: 15.00     Types: Cigarettes     Start date:      Quit date:      Years since quittin.9    Smokeless tobacco: Never   Substance Use Topics    Alcohol use: Yes     Comment: occ                                Counseling given: Not Answered      Vital Signs (Current): There were no vitals filed for this visit.                                            BP Readings from Last 3 Encounters:   22 (!) 144/84   22 108/78   22 118/80       NPO Status:                                                                                 BMI:   Wt Readings from Last 3 Encounters:   22 212 lb (96.2 kg)   22 212 lb (96.2 kg)   22 208 lb 6.4 oz (94.5 kg)     There is no height or weight on file to calculate BMI.    CBC:   Lab Results   Component Value Date/Time    WBC 10.3 2021 05:09 PM    RBC 4.49 2021 05:09 PM    HGB 13.2 2021 05:09 PM    HCT 43.0 2021 05:09 PM    MCV 95.8 2021 05:09 PM    RDW 13.6 2021 05:09 PM     2021 05:09 PM       CMP:   Lab Results   Component Value Date/Time     2021 07:49 AM    K 3.9 2021 07:49 AM     2021 07:49 AM    CO2 26 2021 07:49 AM    BUN 17 2021 07:49 AM    CREATININE 0.6 2021 07:49 AM    GFRAA >59 2021 07:49 AM    LABGLOM >60 12/01/2021 07:49 AM    GLUCOSE 96 12/01/2021 07:49 AM    PROT 7.0 12/01/2021 07:49 AM    CALCIUM 9.2 12/01/2021 07:49 AM    BILITOT 0.4 12/01/2021 07:49 AM    ALKPHOS 81 12/01/2021 07:49 AM    AST 21 12/01/2021 07:49 AM    ALT 18 12/01/2021 07:49 AM       POC Tests: No results for input(s): POCGLU, POCNA, POCK, POCCL, POCBUN, POCHEMO, POCHCT in the last 72 hours. Coags: No results found for: PROTIME, INR, APTT    HCG (If Applicable): No results found for: PREGTESTUR, PREGSERUM, HCG, HCGQUANT     ABGs: No results found for: PHART, PO2ART, PMF4SLC, NEU6TPD, BEART, R8MQICOK     Type & Screen (If Applicable):  No results found for: LABABO, LABRH    Drug/Infectious Status (If Applicable):  No results found for: HIV, HEPCAB    COVID-19 Screening (If Applicable):   Lab Results   Component Value Date/Time    COVID19 DETECTED 12/17/2021 12:24 PM           Anesthesia Evaluation  Patient summary reviewed  Airway: Mallampati: II  TM distance: >3 FB   Neck ROM: full  Mouth opening: < 3 FB   Dental: normal exam         Pulmonary:normal exam  breath sounds clear to auscultation  (+) asthma:                           ROS comment: Former smoker- quit 1996   Cardiovascular:  Exercise tolerance: good (>4 METS),   (+) hypertension:, hyperlipidemia        Rhythm: regular  Rate: normal           Beta Blocker:  Not on Beta Blocker         Neuro/Psych:   (+) depression/anxiety              ROS comment: CNS stimulant use GI/Hepatic/Renal:   (+) bowel prep,          ROS comment: etoh use per chart. Endo/Other:    (+) : arthritis: OA., .                 Abdominal:   (+) obese,     Abdomen: soft. Vascular: negative vascular ROS. Other Findings:             Anesthesia Plan      general and TIVA     ASA 2       Induction: intravenous. Anesthetic plan and risks discussed with patient.                         MICHAEL Ribeiro - DARIN   12/13/2022

## 2022-12-13 NOTE — H&P
Patient Name: Ubaldo Guerra  : 1970  MRN: [id number]  DATE: 22    Allergies: No Known Allergies     ENDOSCOPY  History and Physical    Procedure:    [x] Diagnostic Colonoscopy       [] Screening Colonoscopy  [] EGD      [] ERCP      [] EUS       [] Other    [x] Previous office notes/History and Physical reviewed from the patients chart. Please see EMR for further details of HPI. I have examined the patient's status immediately prior to the procedure and:      Indications/HPI:    1. Epigastric pain    2. Rectal bleeding      3. History of polyp-Colonoscopy attempt yesterday revealed a polyp but overall the exam was was suboptimal due to poor prep    []Abdominal Pain   []Cancer- GI/Lung     []Fhx of colon CA/polyps  []History of Polyps  []Barretts            []Melena  []Abnormal Imaging              []Dysphagia              []Persistent Pneumonia   []Anemia                            []Food Impaction        []History of Polyps  [] GI Bleed             []Pulmonary nodule/Mass   []Change in bowel habits []Heartburn/Reflux  []Rectal Bleed (BRBPR)  []Chest Pain - Non Cardiac []Heme (+) Stool []Ulcers  []Constipation  []Hemoptysis  []Varices  []Diarrhea  []Hypoxemia    []Nausea/Vomiting   []Screening   []Crohns/Colitis  []Other:     Anesthesia:   [x] MAC [] Moderate Sedation   [] General   [] None     ROS: 12 pt Review of Symptoms was negative unless mentioned above    Medications:   Prior to Admission medications    Medication Sig Start Date End Date Taking? Authorizing Provider   ondansetron (ZOFRAN) 4 MG tablet TAKE 1 TABLET 30 MIN BEFORE EACH DOSE OF BOWEL PREP.  THEN EVERY 6 HOURS A NEEDED 22   Merrill May MD   pantoprazole (PROTONIX) 40 MG tablet Take 1 tablet by mouth daily 22   Merrill May MD   desvenlafaxine succinate (PRISTIQ) 100 MG TB24 extended release tablet TAKE 1 TABLET BY MOUTH ONCE DAILY 22   Delfina Tapia MD   phentermine (ADIPEX-P) 37.5 MG tablet Take 1 tablet by mouth every morning (before breakfast) for 30 days. 11/18/22 12/18/22  Gayle Sosa MD   cetirizine (ZYRTEC) 10 MG tablet Take 10 mg by mouth daily    Historical Provider, MD   busPIRone (BUSPAR) 15 MG tablet TAKE 1 TO 2 TABLETS BY MOUTH TWICE DAILY. 7/20/22   Gayle Sosa MD   fluticasone [facility]) 50 MCG/ACT nasal spray 2 sprays by Each Nostril route daily 3/22/21   Gayle Sosa MD   albuterol sulfate HFA (VENTOLIN HFA) 108 (90 Base) MCG/ACT inhaler Inhale 2 puffs into the lungs every 6 hours as needed for Wheezing 3/18/20   Gayle Sosa MD       Past Medical History:  Past Medical History:   Diagnosis Date    Acquired deviated nasal septum     Anal bleeding 10/31/2016    Ankle injury     Asthma     Benign paroxysmal positional vertigo     Derangement of medial meniscus     L knee mild, clinical diagnosis 9/19/12 after turning foot and popping sound 9/16/12 while walking and turn.     Eustachian tube dysfunction     Excessive sleepiness     During the day    Generalized anxiety disorder     H/O varicose veins     History of sprain of foot     Osteoarthritis     Pain in joint of left knee     Palpitations     Psoriasis        Past Surgical History:  Past Surgical History:   Procedure Laterality Date    APPENDECTOMY      BREAST SURGERY      lift and a left implant    Curt Melgoza    COLONOSCOPY N/A 12/12/2022    Dr Bibiana Donovan,  patch of erythema in distal rectum, Mod diverticulosis left colon, int hem Gr 1, Sub prep no adequate Repeat 12-13-22    ENDOSCOPY, COLON, DIAGNOSTIC      KNEE ARTHROSCOPY Bilateral     [place] COLONOSCOPY FLX DX W/COLLJ SPEC WHEN PFRMD N/A 12/19/2016    Dr Jefferson Doing    SINUS SURGERY      nasal septoplasty/ maxillary antrostomies    TONSILLECTOMY      UPPER Mevelyn Chowdhury    Dr. Dharmesh Romero, [place]    UPPER GASTROINTESTINAL ENDOSCOPY N/A 12/12/2022    Dr Gemma Butler [facility]       Social History:  Social History     Tobacco Use    Smoking status: Former     Packs/day: 1.50     Years: 10.00     Pack years: 15.00     Types: Cigarettes     Start date:      Quit date:      Years since quittin.9    Smokeless tobacco: Never   Vaping Use    Vaping Use: Never used   Substance Use Topics    Alcohol use: Yes     Comment: occ    Drug use: No       Vital Signs:   Vitals:    22 1028   BP: 134/87   Pulse: 87   Resp: 14   Temp: 98.3 °F (36.8 °C)   SpO2: 95%        Physical Exam:  Cardiac:  [x]WNL  []Comments:  Pulmonary:  [x]WNL   []Comments:  Neuro/Mental Status:  [x]WNL  []Comments:  Abdominal:  [x]WNL    []Comments:  Other:   []WNL  []Comments:    Informed Consent:  The risks and benefits of the procedure have been discussed with either the patient or if they cannot consent, their representative. Assessment:  Patient examined and appropriate for planned sedation and procedure. Plan:  Proceed with planned sedation and procedure as above.          Melanie Couch MD

## 2022-12-13 NOTE — OP NOTE
Patient: Leonardo Pitts : 1970  [facility] Rec#: [id number] Acc#: [id number]   Primary Care Provider Johnathan Smith MD    Date of Procedure:  2022    Endoscopist: Brandie Giordano MD, MD    Referring Provider: Johnathan Smith MD,     Operation Performed: Colonoscopy up to the terminal ileum    Indications:   1. Epigastric pain    2. Rectal bleeding    3. History of polyp-Colonoscopy attempt yesterday revealed a polyp but overall the exam was was suboptimal due to poor prep    Anesthesia:  Sedation was administered by anesthesia who monitored the patient during the procedure. I met with Ginger Lennette Primrose prior to procedure. We discussed the procedure itself, and I have discussed the risks of endoscopy (including-- but not limited to-- pain, discomfort, bleeding potentially requiring second endoscopic procedure and/or blood transfusion, organ perforation requiring operative repair, damage to organs near the colon, infection, aspiration, cardiopulmonary/allergic reaction), benefits, indications to endoscopy. Additionally, we discussed options other than colonoscopy. The patient expressed understanding. All questions answered. The patient decided to proceed with the procedure. Signed informed consent was placed on the chart. Blood Loss: minimal    Withdrawal time: More than 10 minutes  Bowel Prep: adequate and good    Complications: no immediate complications    DESCRIPTION OF PROCEDURE:     A time out was performed. After written informed consent was obtained, the patient was placed in the left lateral position. The perianal area was inspected, and a digital rectal exam was performed. A rectal exam was performed: normal tone, no palpable lesions. At this point, a forward viewing Olympus colonoscope was inserted into the anus and carefully advanced to the terminal ileum. The cecum was identified by the ileocecal valve and the appendiceal orifice.  The colonoscope was then slowly withdrawn with careful inspection of the mucosa in a linear and circumferential fashion. The scope was retroflexed in the rectum. Suction was utilized during the procedure to remove as much air as possible from the bowel. The colonoscope was removed from the patient, and the procedure was terminated. Findings are listed below. Findings:   Small erosions in areas of cold biopsies done yesterday in the colon and a shallow ulceration without any bleeding stigmata at the site of polypectomy in the cecum were noted as expected. A tortuous redundant colon likely from previous constipation. Otherwise, the mucosa appeared normal throughout the entire examined colon and examined terminal ileum. NO large polyps or masses or strictures or colitis. Internal hemorrhoids-Grade 1, likely source of recent bright red blood per rectum but without any bleeding stigmata at the time of this exam  Retroflexion in the rectum was otherwise normal and revealed no further abnormalities          Recommendations:  1. Repeat colonoscopy: pending pathology -in 5 years based on the recent colonoscopy findings; sooner if her personal or family history as pertaining to colorectal cancer risk changes requiring an earlier exam or if the patient were to develop lower GI symptoms such as bleeding, abdominal pain, change in bowel habits or stool caliber or if the patient has anemia or unexplained weight loss in the future. 2. Await biopsy results-you will receive a letter with your results within 7-10 days    - Resume previous meds and diet  - GI clinic f/u 6 weeks with Ms. Mayelin Harper scheduled f/u appts with other MDs     - NO ASA/NSAIDs x 2 weeks     Findings and recommendations were discussed w/ the patient. A copy of the images was provided.     (Please note that portions of this note were completed with a voice recognition program. Efforts were made to edit the dictations but occasionally words may be mis-transcribed.)     Zenaida Hui Yobani Brewer MD, MD  12/13/2022  11:58 AM

## 2022-12-15 ENCOUNTER — HOSPITAL ENCOUNTER (OUTPATIENT)
Dept: MAMMOGRAPHY | Facility: HOSPITAL | Age: 52
Discharge: HOME | End: 2022-12-15
Admitting: INTERNAL MEDICINE

## 2022-12-15 DIAGNOSIS — Z12.31 ENCOUNTER FOR SCREENING MAMMOGRAM FOR BREAST CANCER: ICD-10-CM

## 2022-12-15 PROCEDURE — 77067 SCR MAMMO BI INCL CAD: CPT

## 2022-12-15 PROCEDURE — 77063 BREAST TOMOSYNTHESIS BI: CPT

## 2023-01-11 ENCOUNTER — PATIENT MESSAGE (OUTPATIENT)
Dept: INTERNAL MEDICINE | Age: 53
End: 2023-01-11

## 2023-01-11 RX ORDER — CYCLOBENZAPRINE HCL 5 MG
5 TABLET ORAL 2 TIMES DAILY PRN
Qty: 40 TABLET | Refills: 0 | Status: SHIPPED | OUTPATIENT
Start: 2023-01-11 | End: 2023-01-31

## 2023-01-11 NOTE — TELEPHONE ENCOUNTER
From: Rosalba Tirado  To: Dr. Sofia Donaldson  Sent: 1/11/2023 8:32 AM CST  Subject: Back     Hi. I have strained my back having muscle spasms. I wondered if Dr Donaldson could call me in some muscle relaxers?   Thank you  Rosalba

## 2023-01-25 ENCOUNTER — OFFICE VISIT (OUTPATIENT)
Age: 53
End: 2023-01-25

## 2023-01-25 VITALS
BODY MASS INDEX: 32.71 KG/M2 | TEMPERATURE: 97.9 F | HEIGHT: 68 IN | HEART RATE: 104 BPM | SYSTOLIC BLOOD PRESSURE: 118 MMHG | RESPIRATION RATE: 20 BRPM | WEIGHT: 215.8 LBS | DIASTOLIC BLOOD PRESSURE: 78 MMHG | OXYGEN SATURATION: 98 %

## 2023-01-25 DIAGNOSIS — J39.8 CONGESTION OF UPPER RESPIRATORY TRACT: Primary | ICD-10-CM

## 2023-01-25 DIAGNOSIS — J02.9 SORE THROAT: ICD-10-CM

## 2023-01-25 LAB
INFLUENZA A ANTIBODY: NORMAL
INFLUENZA B ANTIBODY: NORMAL
S PYO AG THROAT QL: NORMAL

## 2023-01-25 ASSESSMENT — ENCOUNTER SYMPTOMS
SINUS PRESSURE: 0
COLOR CHANGE: 0
SHORTNESS OF BREATH: 0
STRIDOR: 0
ABDOMINAL PAIN: 0
TROUBLE SWALLOWING: 0
EYE PAIN: 0
EYE DISCHARGE: 0
ABDOMINAL DISTENTION: 0
WHEEZING: 0
COUGH: 1
CHEST TIGHTNESS: 0
SORE THROAT: 1

## 2023-01-25 NOTE — PROGRESS NOTES
[id number]  [address] [facility] [address] [address]  Dept: [phone]  Dept Fax: [phone]  Loc: [phone]    Manjinder Sanchez is a 45 y.o. female who presents today for her medical conditions/complaints as noted below. Manjinder Sanchez is complaining of Congestion, Cough (Since Sunday ), Fatigue, Generalized Body Aches, Fever, and Pharyngitis        HPI:   Cough  Associated symptoms include a fever and a sore throat. Pertinent negatives include no chest pain, chills, rash, shortness of breath or wheezing. Fatigue  Associated symptoms include coughing, fatigue, a fever and a sore throat. Pertinent negatives include no abdominal pain, arthralgias, chest pain, chills, congestion, neck pain, numbness, rash or weakness. Generalized Body Aches  Associated symptoms include coughing, fatigue, a fever and a sore throat. Pertinent negatives include no abdominal pain, arthralgias, chest pain, chills, congestion, neck pain, numbness, rash or weakness. Fever   Associated symptoms include coughing and a sore throat. Pertinent negatives include no abdominal pain, chest pain, congestion, rash, urinary pain or wheezing. Pharyngitis  Associated symptoms include coughing, fatigue, a fever and a sore throat. Pertinent negatives include no abdominal pain, arthralgias, chest pain, chills, congestion, neck pain, numbness, rash or weaknessTorie Navarrete presents to the office complaining of cough, body aches, chills, sore throat, and fatigue. Symptoms first started Sunday but worsened yesterday. She denies shortness of breath. She does not have a thermometer to check temp.      Past Medical History:   Diagnosis Date    Acquired deviated nasal septum     Anal bleeding 10/31/2016    Ankle injury     Asthma     Benign paroxysmal positional vertigo     Derangement of medial meniscus     L knee mild, clinical diagnosis 9/19/12 after turning foot and popping sound 12 while walking and turn.     Eustachian tube dysfunction     Excessive sleepiness     During the day    Generalized anxiety disorder     H/O varicose veins     History of sprain of foot     Osteoarthritis     Pain in joint of left knee     Palpitations     Psoriasis        Past Surgical History:   Procedure Laterality Date    APPENDECTOMY      BREAST SURGERY      lift and a left implant    Gladys Carter Deep    COLONOSCOPY N/A 2022    Dr Lady Mills, (-)Micro Colitis, sm patch of erythema in distal rectum, Mod diverticulosis left colon, int hem Gr 1, Sub prep no adequate Repeat 22    COLONOSCOPY N/A 2022    Dr Lady Mills, sm erosions in areas of biopsies done from day previous, sm ulcerations wo bleeding at site of polypectomy, tortuous redundant colon, int hem Gr 1 likely recent blood from rectum but wo at time of exam, 5 year recall    ENDOSCOPY, COLON, DIAGNOSTIC      KNEE ARTHROSCOPY Bilateral     [place] COLONOSCOPY FLX DX W/COLLJ SPEC WHEN PFRMD N/A 2016    Dr Minerva Colindres    SINUS SURGERY      nasal septoplasty/ maxillary antrostomies    TONSILLECTOMY      UPPER Geovanny Neeru Kennedy, [address] [facility] ENDOSCOPY N/A 2022    Dr Lisa Galarza [facility]. (-)Sprue       Family History   Problem Relation Age of Onset    Colon Cancer Maternal Grandmother 60    Colon Polyps Neg Hx     Esophageal Cancer Neg Hx     Liver Cancer Neg Hx     Liver Disease Neg Hx     Stomach Cancer Neg Hx     Rectal Cancer Neg Hx        Social History     Tobacco Use    Smoking status: Former     Packs/day: 1.50     Years: 10.00     Pack years: 15.00     Types: Cigarettes     Start date:      Quit date:      Years since quittin.0    Smokeless tobacco: Never   Substance Use Topics    Alcohol use: Yes     Comment: occ        Current Outpatient Medications   Medication Sig Dispense Refill cyclobenzaprine (FLEXERIL) 5 MG tablet Take 1 tablet by mouth 2 times daily as needed for Muscle spasms 40 tablet 0    pantoprazole (PROTONIX) 40 MG tablet Take 1 tablet by mouth daily 30 tablet 5    desvenlafaxine succinate (PRISTIQ) 100 MG TB24 extended release tablet TAKE 1 TABLET BY MOUTH ONCE DAILY 90 tablet 1    cetirizine (ZYRTEC) 10 MG tablet Take 10 mg by mouth daily      busPIRone (BUSPAR) 15 MG tablet TAKE 1 TO 2 TABLETS BY MOUTH TWICE DAILY. 120 tablet 3    fluticasone (FLONASE) 50 MCG/ACT nasal spray 2 sprays by Each Nostril route daily 1 Bottle 0    albuterol sulfate HFA (VENTOLIN HFA) 108 (90 Base) MCG/ACT inhaler Inhale 2 puffs into the lungs every 6 hours as needed for Wheezing 1 Inhaler 3    ondansetron (ZOFRAN) 4 MG tablet TAKE 1 TABLET 30 MIN BEFORE EACH DOSE OF BOWEL PREP. THEN EVERY 6 HOURS A NEEDED (Patient not taking: Reported on 1/25/2023) 6 tablet 0     No current facility-administered medications for this visit. No Known Allergies    Health Maintenance   Topic Date Due    DTaP/Tdap/Td vaccine (1 - Tdap) Never done    Shingles vaccine (1 of 2) Never done    Cervical cancer screen  08/23/2020    COVID-19 Vaccine (3 - Booster for Moderna series) 07/05/2021    Depression Monitoring  02/28/2023    A1C test (Diabetic or Prediabetic)  11/17/2023    Breast cancer screen  12/15/2024    Lipids  11/17/2027    Colorectal Cancer Screen  12/13/2027    Flu vaccine  Completed    Hepatitis C screen  Completed    HIV screen  Completed    Hepatitis A vaccine  Aged Out    Hib vaccine  Aged Out    Meningococcal (ACWY) vaccine  Aged Out    Pneumococcal 0-64 years Vaccine  Aged Out       Subjective:   Review of Systems   Constitutional:  Positive for fatigue and fever. Negative for chills. HENT:  Positive for sore throat. Negative for congestion, sinus pressure and trouble swallowing. Eyes:  Negative for pain and discharge. Respiratory:  Positive for cough.  Negative for chest tightness, shortness of breath, wheezing and stridor. Cardiovascular:  Negative for chest pain and palpitations. Gastrointestinal:  Negative for abdominal distention and abdominal pain. Genitourinary:  Negative for difficulty urinating, dysuria and hematuria. Musculoskeletal:  Negative for arthralgias, neck pain and neck stiffness. Skin:  Negative for color change and rash. Neurological:  Negative for dizziness, syncope, speech difficulty, weakness and numbness. Psychiatric/Behavioral:  Negative for confusion and suicidal ideas. Objective    Physical Exam  Vitals and nursing note reviewed. Constitutional:       General: She is not in acute distress. Appearance: She is ill-appearing. HENT:      Head: Normocephalic. Right Ear: Tympanic membrane, ear canal and external ear normal.      Left Ear: Tympanic membrane, ear canal and external ear normal.      Nose: Rhinorrhea present. No congestion. Mouth/Throat:      Mouth: Mucous membranes are moist.      Pharynx: Oropharynx is clear. No posterior oropharyngeal erythema. Eyes:      Conjunctiva/sclera: Conjunctivae normal.      Pupils: Pupils are equal, round, and reactive to light. Cardiovascular:      Rate and Rhythm: Normal rate and regular rhythm. Pulses: Normal pulses. Heart sounds: Normal heart sounds. No murmur heard. Pulmonary:      Effort: Pulmonary effort is normal. No respiratory distress. Breath sounds: Normal breath sounds. No stridor. No wheezing. Abdominal:      General: Abdomen is flat. Bowel sounds are normal. There is no distension. Tenderness: There is no abdominal tenderness. Musculoskeletal:         General: No swelling or deformity. Normal range of motion. Cervical back: Normal range of motion. No rigidity or tenderness. Skin:     General: Skin is warm and dry. Findings: No rash. Neurological:      General: No focal deficit present.       Mental Status: She is alert and oriented to person, place, and time. Sensory: No sensory deficit. /78   Pulse (!) 104   Temp 97.9 °F (36.6 °C) (Temporal)   Resp 20   Ht 5' 8\" (1.727 m)   Wt 215 lb 12.8 oz (97.9 kg)   SpO2 98%   BMI 32.81 kg/m²     Assessment         Diagnosis Orders   1. Congestion of upper respiratory tract  POCT Influenza A/B    POCT rapid strep A      2. Sore throat  POCT Influenza A/B    POCT rapid strep A          Plan   1. Patient negative for Influenza A/B  2. Encourage fluids, tylenol/Motrin, and rest  3. No school/work for 7 days from symptom onset  4. Explained that I believe she has the flu and that it may be too early to flag positive. 5. Educated on common secondary infections  6. If high persistent fever, Shortness of breath, dehydration, or lethargy occurs go to ER    Patient verbalized understanding and agrees to treatment plan. Orders Placed This Encounter   Procedures    POCT Influenza A/B    POCT rapid strep A       Results for orders placed or performed in visit on 01/25/23   POCT Influenza A/B   Result Value Ref Range    Influenza A Ab neg     Influenza B Ab neg    POCT rapid strep A   Result Value Ref Range    Strep A Ag None Detected None Detected       No orders of the defined types were placed in this encounter. New Prescriptions    No medications on file        No follow-ups on file. Discussed use, benefits, and side effects of any prescribed medications. All patient questions were answered. Patient voiced understanding of care plan. Patient was given educational materials - see patient instructions below. Patient Instructions   1. Patient negative for Influenza A/B  2. Encourage fluids, tylenol/Motrin, and rest  3. No school/work for 7 days from symptom onset  4. Explained that I believe she has the flu and that it may be too early to flag positive. 5. Educated on common secondary infections  6.  If high persistent fever, Shortness of breath, dehydration, or lethargy occurs go to ER    Patient verbalized understanding and agrees to treatment plan.       Electronically signed by MICHAEL Phoenix CNP on 1/25/2023 at 9:28 AM

## 2023-01-25 NOTE — LETTER
[facility]  [address] [facility] [address] [address]  Phone: [phone]  Fax: [phone]    MICHAEL Nash CNP        January 25, 2023     Patient: Amber Kwon   YOB: 1970   Date of Visit: 1/25/2023       To Whom it May Concern:    Brook Yu was seen in my clinic on 1/25/2023. She may return to work on 01/31/23. If you have any questions or concerns, please don't hesitate to call.     Sincerely,         MICHAEL Nash CNP

## 2023-01-25 NOTE — PATIENT INSTRUCTIONS
1.  Patient negative for Influenza A/B  2. Encourage fluids, tylenol/Motrin, and rest  3. No school/work for 7 days from symptom onset  4. Explained that I believe she has the flu and that it may be too early to flag positive. 5. Educated on common secondary infections  6. If high persistent fever, Shortness of breath, dehydration, or lethargy occurs go to ER    Patient verbalized understanding and agrees to treatment plan.

## 2023-02-02 ENCOUNTER — OFFICE VISIT (OUTPATIENT)
Dept: GASTROENTEROLOGY | Age: 53
End: 2023-02-02
Payer: COMMERCIAL

## 2023-02-02 VITALS
HEIGHT: 68 IN | BODY MASS INDEX: 32.43 KG/M2 | DIASTOLIC BLOOD PRESSURE: 68 MMHG | HEART RATE: 113 BPM | WEIGHT: 214 LBS | OXYGEN SATURATION: 96 % | SYSTOLIC BLOOD PRESSURE: 120 MMHG

## 2023-02-02 DIAGNOSIS — K63.5 CECAL POLYP: ICD-10-CM

## 2023-02-02 DIAGNOSIS — K44.9 HIATAL HERNIA: Primary | ICD-10-CM

## 2023-02-02 PROCEDURE — 3074F SYST BP LT 130 MM HG: CPT | Performed by: NURSE PRACTITIONER

## 2023-02-02 PROCEDURE — 3078F DIAST BP <80 MM HG: CPT | Performed by: NURSE PRACTITIONER

## 2023-02-02 PROCEDURE — 99213 OFFICE O/P EST LOW 20 MIN: CPT | Performed by: NURSE PRACTITIONER

## 2023-02-02 ASSESSMENT — ENCOUNTER SYMPTOMS
TROUBLE SWALLOWING: 0
CONSTIPATION: 0
ABDOMINAL DISTENTION: 0
SHORTNESS OF BREATH: 0
COUGH: 0
DIARRHEA: 0
ANAL BLEEDING: 0
NAUSEA: 0
ABDOMINAL PAIN: 0
BLOOD IN STOOL: 0
CHOKING: 0
VOMITING: 0
RECTAL PAIN: 0

## 2023-02-02 NOTE — PROGRESS NOTES
Subjective:     Patient ID: Vanessa Bonner is a 45 y.o. female  PCP: Olaf Jin MD  Referring Provider: No ref. provider found    HPI  Patient presents to the office today with the following complaints: Follow-up (Pt here for 6 week follow up after EGD.)      Pt seen today for follow up after EGD and Colonoscopy on 12/12/2022 with repeat Colonoscopy on 12/13/2022. Pt had c/o rectal bleeding and abdominal pain at last OV. Today, pt states she is doing well. Denies any further abdominal pain or bleeding. She denies any further issues or concerns. She is taking Protonix 40 mg daily. All scope and pathology reports were reviewed and discussed with patient. All questions answered, pt verbalized understanding. EGD Findings/IMPRESSION 12/12/2022:  Esophagus: normal and EG junction at 38 cm also appeared normal.  NO erosions or ulcers or nodules or strictures or webs or rings or mass lesions or extrinsic compression or diverticula noted. There is a 2 to 3 cm in length small hiatal hernia present which may be a contributing factor to some of her epigastric pain. Stomach:  normal except for the small hiatal hernia which also was seen on the retroflexed exam.  NO ulcers or masses or gastric outlet obstruction or retained food or fluid. Rugae were normal and lumen distended well with insufflation. Retroflexed views otherwise revealed a normal GE junction, fundus and cardia as well. Duodenum: Normal. Random biopsies were taken to check for Celiac disease and other causes of villous atrophy. RECOMMENDATIONS:    1. Await path results, the patient will be contacted in 7-10 days with biopsy results. 2.  Small frequent meals and follow anti-GERD measures along with PPI at least once daily    Colonoscopy Findings 12/12/2022: A 4 mm in diameter sessile benign-appearing cecal polyp was removed by hot biopsy.   Small patch of erythema noted in the distal rectum-prep artifact versus rectal mucosal prolapse injury. Cold biopsies were taken for pathology. NO large polyps or masses or strictures or overt IBD. Suboptimal exam due to prep quality which was not adequate for a more thorough colorectal cancer screening as described above. Moderate diverticulosis in the left colon  Internal hemorrhoids-Grade 1  Where it was clearly visible, the mucosa appeared normal throughout the entire examined colon  Retroflexion in the rectum was otherwise normal and revealed no further abnormalities    Recommendations:  1. Repeat colonoscopy: If feasible, tomorrow or  later this week or next week if okay with patient with a strict 2-day clear liquid diet and a 2-day prep using Plenvu or a similar solution. 2. Await biopsy results-you will receive a letter with your results within 7-10 days.  - Resume previous meds and diet  - GI clinic f/u 6 weeks with    - Keep scheduled f/u appts with other MDs   - NO ASA/NSAIDs x 2 weeks     Colonoscopy Findings 12/13/2022:   Small erosions in areas of cold biopsies done yesterday in the colon and a shallow ulceration without any bleeding stigmata at the site of polypectomy in the cecum were noted as expected. A tortuous redundant colon likely from previous constipation. Otherwise, the mucosa appeared normal throughout the entire examined colon and examined terminal ileum. NO large polyps or masses or strictures or colitis. Internal hemorrhoids-Grade 1, likely source of recent bright red blood per rectum but without any bleeding stigmata at the time of this exam  Retroflexion in the rectum was otherwise normal and revealed no further abnormalities    Recommendations:  1.  Repeat colonoscopy: pending pathology -in 5 years based on the recent colonoscopy findings; sooner if her personal or family history as pertaining to colorectal cancer risk changes requiring an earlier exam or if the patient were to develop lower GI symptoms such as bleeding, abdominal pain, change in bowel habits or stool caliber or if the patient has anemia or unexplained weight loss in the future. 2. Await biopsy results-you will receive a letter with your results within 7-10 days  - Resume previous meds and diet  - GI clinic f/u 6 weeks with Ms. Kody Gregory scheduled f/u appts with other MDs   - NO ASA/NSAIDs x 2 weeks     FINAL DIAGNOSIS:   A.  Small intestine, random duodenal biopsy:   Benign duodenal mucosa with focal mild chronic nonspecific inflammation, negative for evidence of sprue. B.  Colon, cecal polypectomy:   Micro fragment of colonic mucosa with severe cautery artifact. C.  Colon, random rectal biopsies:   Benign colonic mucosa with no significant histopathologic changes. Assessment:     1. Hiatal hernia    2. Cecal polyp            Plan:   - Continue Protonix 40 mg daily  - Follow up prn or sooner if needed  - Call with any questions or concerns       Orders  No orders of the defined types were placed in this encounter. Medications  No orders of the defined types were placed in this encounter. Patient History:     Past Medical History:   Diagnosis Date    Acquired deviated nasal septum     Anal bleeding 10/31/2016    Ankle injury     Asthma     Benign paroxysmal positional vertigo     Derangement of medial meniscus     L knee mild, clinical diagnosis 9/19/12 after turning foot and popping sound 9/16/12 while walking and turn.     Eustachian tube dysfunction     Excessive sleepiness     During the day    Generalized anxiety disorder     H/O varicose veins     History of sprain of foot     Osteoarthritis     Pain in joint of left knee     Palpitations     Psoriasis        Past Surgical History:   Procedure Laterality Date    APPENDECTOMY      BREAST SURGERY      lift and a left implant    Billie Lerner    COLONOSCOPY N/A 12/12/2022    Dr Ginna Holden, (-)Micro Colitis, sm patch of erythema in distal rectum, Mod diverticulosis left colon, int hem Gr 1, Sub prep no adequate Repeat 22    COLONOSCOPY N/A 2022    Dr Chapman Bumps, sm erosions in areas of biopsies done from day previous, sm ulcerations wo bleeding at site of polypectomy, tortuous redundant colon, int hem Gr 1 likely recent blood from rectum but wo at time of exam, 5 year recall    ENDOSCOPY, COLON, DIAGNOSTIC      KNEE ARTHROSCOPY Bilateral     [place] COLONOSCOPY FLX DX W/COLLJ SPEC WHEN PFRMD N/A 2016    Dr Barajas [place]    SINUS SURGERY      nasal septoplasty/ maxillary antrostomies    TONSILLECTOMY      UPPER [facility] Shoulder    Dr. Lela Bennett, [address] [facility] ENDOSCOPY N/A 2022    Dr Diana Butler cm [facility]. (-)Sprue       Family History   Problem Relation Age of Onset    Colon Cancer Maternal Grandmother 61    Colon Polyps Neg Hx     Esophageal Cancer Neg Hx     Liver Cancer Neg Hx     Liver Disease Neg Hx     Stomach Cancer Neg Hx     Rectal Cancer Neg Hx        Social History     Socioeconomic History    Marital status:     Tobacco Use    Smoking status: Former     Packs/day: 1.50     Years: 10.00     Pack years: 15.00     Types: Cigarettes     Start date:      Quit date:      Years since quittin.1    Smokeless tobacco: Never   Vaping Use    Vaping Use: Never used   Substance and Sexual Activity    Alcohol use: Yes     Comment: occ    Drug use: No     Social Determinants of Health     Financial Resource Strain: Low Risk     Difficulty of Paying Living Expenses: Not hard at all   Food Insecurity: No Food Insecurity    Worried About Running Out of Food in the Last Year: Never true    Ran Out of Food in the Last Year: Never true       Current Outpatient Medications   Medication Sig Dispense Refill    pantoprazole (PROTONIX) 40 MG tablet Take 1 tablet by mouth daily 30 tablet 5    desvenlafaxine succinate (PRISTIQ) 100 MG TB24 extended release tablet TAKE 1 TABLET BY MOUTH ONCE DAILY 90 tablet 1    cetirizine (ZYRTEC) 10 MG tablet Take 10 mg by mouth daily      busPIRone (BUSPAR) 15 MG tablet TAKE 1 TO 2 TABLETS BY MOUTH TWICE DAILY. 120 tablet 3    fluticasone (FLONASE) 50 MCG/ACT nasal spray 2 sprays by Each Nostril route daily 1 Bottle 0    albuterol sulfate HFA (VENTOLIN HFA) 108 (90 Base) MCG/ACT inhaler Inhale 2 puffs into the lungs every 6 hours as needed for Wheezing 1 Inhaler 3    ondansetron (ZOFRAN) 4 MG tablet TAKE 1 TABLET 30 MIN BEFORE EACH DOSE OF BOWEL PREP. THEN EVERY 6 HOURS A NEEDED (Patient not taking: No sig reported) 6 tablet 0     No current facility-administered medications for this visit. No Known Allergies    Review of Systems   Constitutional:  Negative for activity change, appetite change, fatigue, fever and unexpected weight change. HENT:  Negative for trouble swallowing. Respiratory:  Negative for cough, choking and shortness of breath. Cardiovascular:  Negative for chest pain. Gastrointestinal:  Negative for abdominal distention, abdominal pain, anal bleeding, blood in stool, constipation, diarrhea, nausea, rectal pain and vomiting. Allergic/Immunologic: Negative for food allergies. All other systems reviewed and are negative. Objective:     /68   Pulse (!) 113   Ht 5' 8\" (1.727 m)   Wt 214 lb (97.1 kg)   SpO2 96%   BMI 32.54 kg/m²     Physical Exam  Vitals reviewed. Constitutional:       General: She is not in acute distress. Appearance: She is well-developed. Eyes:      General:         Right eye: No discharge. Left eye: No discharge. Cardiovascular:      Rate and Rhythm: Normal rate and regular rhythm. Heart sounds: No murmur heard. Pulmonary:      Effort: Pulmonary effort is normal. No respiratory distress. Breath sounds: Normal breath sounds. Abdominal:      General: Bowel sounds are normal. There is no distension. Palpations: Abdomen is soft. There is no mass. Tenderness:  There is no abdominal tenderness. There is no guarding or rebound. Musculoskeletal:         General: Normal range of motion. Cervical back: Normal range of motion. Skin:     General: Skin is warm and dry. Neurological:      Mental Status: She is alert and oriented to person, place, and time.    Psychiatric:         Behavior: Behavior normal.

## 2023-02-09 ENCOUNTER — OFFICE VISIT (OUTPATIENT)
Dept: INTERNAL MEDICINE | Age: 53
End: 2023-02-09
Payer: COMMERCIAL

## 2023-02-09 VITALS
OXYGEN SATURATION: 97 % | DIASTOLIC BLOOD PRESSURE: 78 MMHG | HEART RATE: 73 BPM | HEIGHT: 68 IN | BODY MASS INDEX: 32.43 KG/M2 | WEIGHT: 214 LBS | SYSTOLIC BLOOD PRESSURE: 118 MMHG | RESPIRATION RATE: 18 BRPM

## 2023-02-09 DIAGNOSIS — M54.16 LEFT LUMBAR RADICULOPATHY: ICD-10-CM

## 2023-02-09 DIAGNOSIS — M54.50 LUMBAR PAIN: Primary | ICD-10-CM

## 2023-02-09 DIAGNOSIS — S39.012A STRAIN OF LUMBAR REGION, INITIAL ENCOUNTER: ICD-10-CM

## 2023-02-09 PROCEDURE — 99213 OFFICE O/P EST LOW 20 MIN: CPT | Performed by: INTERNAL MEDICINE

## 2023-02-09 PROCEDURE — 3074F SYST BP LT 130 MM HG: CPT | Performed by: INTERNAL MEDICINE

## 2023-02-09 PROCEDURE — 3078F DIAST BP <80 MM HG: CPT | Performed by: INTERNAL MEDICINE

## 2023-02-09 RX ORDER — METHYLPREDNISOLONE 4 MG/1
TABLET ORAL
Qty: 1 KIT | Refills: 0 | Status: SHIPPED | OUTPATIENT
Start: 2023-02-09 | End: 2023-02-15

## 2023-02-09 RX ORDER — HYDROCODONE BITARTRATE AND ACETAMINOPHEN 5; 325 MG/1; MG/1
1 TABLET ORAL DAILY PRN
Qty: 10 TABLET | Refills: 0 | Status: SHIPPED | OUTPATIENT
Start: 2023-02-09 | End: 2023-02-23

## 2023-02-09 RX ORDER — METHOCARBAMOL 750 MG/1
750 TABLET, FILM COATED ORAL 2 TIMES DAILY PRN
Qty: 20 TABLET | Refills: 0 | Status: SHIPPED | OUTPATIENT
Start: 2023-02-09 | End: 2023-02-19

## 2023-02-09 RX ORDER — MELOXICAM 15 MG/1
15 TABLET ORAL DAILY
Qty: 15 TABLET | Refills: 0 | Status: SHIPPED | OUTPATIENT
Start: 2023-02-09

## 2023-02-09 SDOH — ECONOMIC STABILITY: FOOD INSECURITY: WITHIN THE PAST 12 MONTHS, THE FOOD YOU BOUGHT JUST DIDN'T LAST AND YOU DIDN'T HAVE MONEY TO GET MORE.: NEVER TRUE

## 2023-02-09 SDOH — ECONOMIC STABILITY: HOUSING INSECURITY
IN THE LAST 12 MONTHS, WAS THERE A TIME WHEN YOU DID NOT HAVE A STEADY PLACE TO SLEEP OR SLEPT IN A SHELTER (INCLUDING NOW)?: NO

## 2023-02-09 SDOH — ECONOMIC STABILITY: INCOME INSECURITY: HOW HARD IS IT FOR YOU TO PAY FOR THE VERY BASICS LIKE FOOD, HOUSING, MEDICAL CARE, AND HEATING?: NOT HARD AT ALL

## 2023-02-09 SDOH — ECONOMIC STABILITY: FOOD INSECURITY: WITHIN THE PAST 12 MONTHS, YOU WORRIED THAT YOUR FOOD WOULD RUN OUT BEFORE YOU GOT MONEY TO BUY MORE.: NEVER TRUE

## 2023-02-09 SDOH — ECONOMIC STABILITY: TRANSPORTATION INSECURITY
IN THE PAST 12 MONTHS, HAS LACK OF TRANSPORTATION KEPT YOU FROM MEETINGS, WORK, OR FROM GETTING THINGS NEEDED FOR DAILY LIVING?: NO

## 2023-02-09 ASSESSMENT — ENCOUNTER SYMPTOMS
COUGH: 0
BACK PAIN: 1
SORE THROAT: 0
ABDOMINAL PAIN: 0
WHEEZING: 0
CHEST TIGHTNESS: 0
CONSTIPATION: 0

## 2023-02-09 ASSESSMENT — PATIENT HEALTH QUESTIONNAIRE - PHQ9
8. MOVING OR SPEAKING SO SLOWLY THAT OTHER PEOPLE COULD HAVE NOTICED. OR THE OPPOSITE, BEING SO FIGETY OR RESTLESS THAT YOU HAVE BEEN MOVING AROUND A LOT MORE THAN USUAL: 0
10. IF YOU CHECKED OFF ANY PROBLEMS, HOW DIFFICULT HAVE THESE PROBLEMS MADE IT FOR YOU TO DO YOUR WORK, TAKE CARE OF THINGS AT HOME, OR GET ALONG WITH OTHER PEOPLE: 0
6. FEELING BAD ABOUT YOURSELF - OR THAT YOU ARE A FAILURE OR HAVE LET YOURSELF OR YOUR FAMILY DOWN: 0
SUM OF ALL RESPONSES TO PHQ QUESTIONS 1-9: 0
7. TROUBLE CONCENTRATING ON THINGS, SUCH AS READING THE NEWSPAPER OR WATCHING TELEVISION: 0
5. POOR APPETITE OR OVEREATING: 0
2. FEELING DOWN, DEPRESSED OR HOPELESS: 0
SUM OF ALL RESPONSES TO PHQ9 QUESTIONS 1 & 2: 0
1. LITTLE INTEREST OR PLEASURE IN DOING THINGS: 0
9. THOUGHTS THAT YOU WOULD BE BETTER OFF DEAD, OR OF HURTING YOURSELF: 0
4. FEELING TIRED OR HAVING LITTLE ENERGY: 0
3. TROUBLE FALLING OR STAYING ASLEEP: 0
SUM OF ALL RESPONSES TO PHQ QUESTIONS 1-9: 0

## 2023-02-09 NOTE — PROGRESS NOTES
Chief Complaint   Patient presents with    Lower Back Pain     Left lower back pain that radiates into her hip, states that she thinks this is a pulled muscle      History of presenting illness:  Vitaly Bansal is a48 y.o. female who presents today for follow up on her chronic medical conditions as noted below. 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    Generalized anxiety disorder 10/22/2017    IFG (impaired fasting glucose) 10/22/2017    Psoriasis 10/22/2017    IBS (irritable bowel syndrome) 10/22/2017    History of bone density study      Overview Note:     4/2018 normal      Abnormal liver function 08/15/2017    Foot fracture, left 08/15/2017    Foot pain 08/15/2017    Altered bowel function 10/31/2016     Overview Note:     Updating Deprecated Diagnoses      Elevated blood pressure 10/15/2016    Essential (primary) hypertension 10/15/2016     Past Medical History:   Diagnosis Date    Acquired deviated nasal septum     Anal bleeding 10/31/2016    Ankle injury     Asthma     Benign paroxysmal positional vertigo     Derangement of medial meniscus     L knee mild, clinical diagnosis 9/19/12 after turning foot and popping sound 9/16/12 while walking and turn.     Eustachian tube dysfunction     Excessive sleepiness     During the day    Generalized anxiety disorder     H/O varicose veins     History of sprain of foot     Osteoarthritis     Pain in joint of left knee     Palpitations     Psoriasis       Past Surgical History:   Procedure Laterality Date    APPENDECTOMY      BREAST SURGERY      lift and a left implant    Mikel [place]en[place]ro    COLONOSCOPY N/A 12/12/2022    Dr Jimmy Tyler, (-)Micro Colitis, sm patch of erythema in distal rectum, Mod diverticulosis left colon, int hem Gr 1, Sub prep no adequate Repeat 12-13-22    COLONOSCOPY N/A 12/13/2022    Dr Marisela Uriarte, sm erosions in areas of biopsies done from day previous, sm ulcerations wo bleeding at site of polypectomy, tortuous redundant colon, int hem Gr 1 likely recent blood from rectum but wo at time of exam, 5 year recall    ENDOSCOPY, COLON, DIAGNOSTIC      KNEE ARTHROSCOPY Bilateral     MO COLONOSCOPY FLX DX W/COLLJ SPEC WHEN PFRMD N/A 12/19/2016    Dr Rakesh Alfaro    SINUS SURGERY      nasal septoplasty/ maxillary antrostomies    TONSILLECTOMY      UPPER Luvenia Prashant    Dr. Isis Fine, [place]    UPPER GASTROINTESTINAL ENDOSCOPY N/A 12/12/2022    Dr Maxime Milner [facility] (-)[place]     Current Outpatient Medications   Medication Sig Dispense Refill    meloxicam (MOBIC) 15 MG tablet Take 1 tablet by mouth daily 15 tablet 0    methylPREDNISolone (MEDROL DOSEPACK) 4 MG tablet Take by mouth. 1 kit 0    HYDROcodone-acetaminophen (NORCO) 5-325 MG per tablet Take 1 tablet by mouth daily as needed for Pain for up to 14 days. Take lowest dose possible to manage pain Max Daily Amount: 1 tablet 10 tablet 0    methocarbamol (ROBAXIN-750) 750 MG tablet Take 1 tablet by mouth 2 times daily as needed (muscle spasm) 20 tablet 0    pantoprazole (PROTONIX) 40 MG tablet Take 1 tablet by mouth daily 30 tablet 5    desvenlafaxine succinate (PRISTIQ) 100 MG TB24 extended release tablet TAKE 1 TABLET BY MOUTH ONCE DAILY 90 tablet 1    cetirizine (ZYRTEC) 10 MG tablet Take 10 mg by mouth daily      busPIRone (BUSPAR) 15 MG tablet TAKE 1 TO 2 TABLETS BY MOUTH TWICE DAILY. 120 tablet 3    fluticasone (FLONASE) 50 MCG/ACT nasal spray 2 sprays by Each Nostril route daily 1 Bottle 0    albuterol sulfate HFA (VENTOLIN HFA) 108 (90 Base) MCG/ACT inhaler Inhale 2 puffs into the lungs every 6 hours as needed for Wheezing 1 Inhaler 3     No current facility-administered medications for this visit.      No Known Allergies  Social History     Tobacco Use    Smoking status: Former     Packs/day: 1.50     Years: 10.00     Pack years: 15.00     Types: Cigarettes     Start date:      Quit date:      Years since quittin.1    Smokeless tobacco: Never   Substance Use Topics    Alcohol use: Yes     Comment: occ      Family History   Problem Relation Age of Onset    Colon Cancer Maternal Grandmother 61    Colon Polyps Neg Hx     Esophageal Cancer Neg Hx     Liver Cancer Neg Hx     Liver Disease Neg Hx     Stomach Cancer Neg Hx     Rectal Cancer Neg Hx        Review of Systems   Constitutional:  Positive for fatigue. Negative for chills and fever. HENT:  Negative for congestion, ear pain, nosebleeds, postnasal drip and sore throat. Respiratory:  Negative for cough, chest tightness and wheezing. Cardiovascular:  Negative for chest pain, palpitations and leg swelling. Gastrointestinal:  Negative for abdominal pain and constipation. Genitourinary:  Negative for dysuria and urgency. Musculoskeletal:  Positive for back pain. Negative for arthralgias. Skin:  Negative for rash. Neurological:  Negative for dizziness and headaches. Psychiatric/Behavioral: Negative. Vitals:    23 1440   BP: 118/78   Site: Left Upper Arm   Position: Sitting   Cuff Size: Large Adult   Pulse: 73   Resp: 18   SpO2: 97%   Weight: 214 lb (97.1 kg)   Height: 5' 8\" (1.727 m)     Body mass index is 32.54 kg/m². Physical Exam  Constitutional:       Appearance: She is well-developed. HENT:      Right Ear: External ear normal.      Left Ear: External ear normal.      Mouth/Throat:      Pharynx: No oropharyngeal exudate. Eyes:      Conjunctiva/sclera: Conjunctivae normal.      Pupils: Pupils are equal, round, and reactive to light. Neck:      Thyroid: No thyromegaly. Vascular: No JVD. Cardiovascular:      Rate and Rhythm: Normal rate. Heart sounds: Normal heart sounds. No murmur heard. Pulmonary:      Effort: No respiratory distress.       Breath sounds: Rales present. No wheezing. Chest:      Chest wall: No tenderness. Abdominal:      General: Bowel sounds are normal.      Palpations: Abdomen is soft. Musculoskeletal:      Cervical back: Neck supple. Comments: Induced pain on palpation over paraspinal muscles LEFT   ROM with back flexion/ extension in limited  Lower extremity muscle strength and reflexes are normal     Lymphadenopathy:      Cervical: No cervical adenopathy. Skin:     General: Skin is warm. Findings: No rash. Neurological:      Mental Status: She is oriented to person, place, and time. Lab Review   Office Visit on 01/25/2023   Component Date Value    Influenza A Ab 01/25/2023 neg     Influenza B Ab 01/25/2023 neg     Strep A Ag 01/25/2023 None Detected            ASSESSMENT/PLAN:  Lumbar pain    Left lumbar radiculopathy    Strain of lumbar region, initial encounter    RX    -     meloxicam (MOBIC) 15 MG tablet; Take 1 tablet by mouth daily  -     methylPREDNISolone (MEDROL DOSEPACK) 4 MG tablet; Take by mouth. -     methocarbamol (ROBAXIN-750) 750 MG tablet; Take 1 tablet by mouth 2 times daily as needed (muscle spasm)    -     HYDROcodone-acetaminophen (NORCO) 5-325 MG per tablet; Take 1 tablet by mouth daily as needed for Pain for up to 14 days. Take lowest dose possible to manage pain Max Daily Amount: 1 tablet      back exercise instructions sent to patient Yasmine  Avoid lifting over 5 pounds  If sx not improving and resolving , if sx continue or re-occur pt has been instructed to call us and / or return here for follow- up evaluation          No orders of the defined types were placed in this encounter. New Prescriptions    HYDROCODONE-ACETAMINOPHEN (NORCO) 5-325 MG PER TABLET    Take 1 tablet by mouth daily as needed for Pain for up to 14 days.  Take lowest dose possible to manage pain Max Daily Amount: 1 tablet    MELOXICAM (MOBIC) 15 MG TABLET    Take 1 tablet by mouth daily    METHOCARBAMOL (ROBAXIN-750) 750 MG TABLET    Take 1 tablet by mouth 2 times daily as needed (muscle spasm)    METHYLPREDNISOLONE (MEDROL DOSEPACK) 4 MG TABLET    Take by mouth. No follow-ups on file. There are no Patient Instructions on file for this visit. EMR Dragon/transcription disclaimer:Significant part of this  encounter note is electronic transcription/translationof spoken language to printed text. The electronic translation of spoken language may be erroneous, or at times, nonsensical words or phrases may be inadvertently transcribed.  Although I have reviewed the note for sucherrors, some may still exist.

## 2023-02-28 ENCOUNTER — PATIENT MESSAGE (OUTPATIENT)
Dept: INTERNAL MEDICINE | Age: 53
End: 2023-02-28

## 2023-02-28 RX ORDER — VALACYCLOVIR HYDROCHLORIDE 1 G/1
1000 TABLET, FILM COATED ORAL 2 TIMES DAILY
Qty: 4 TABLET | Refills: 2 | Status: SHIPPED | OUTPATIENT
Start: 2023-02-28 | End: 2023-03-02

## 2023-02-28 NOTE — TELEPHONE ENCOUNTER
Rosalba called requesting a refill of the below medication which has been pended for you:     Requested Prescriptions     Pending Prescriptions Disp Refills    valACYclovir (VALTREX) 1 g tablet 4 tablet 2     Sig: Take 1 tablet by mouth 2 times daily for 2 days       Last Appointment Date: 2/9/2023  Next Appointment Date: 5/18/2023    No Known Allergies

## 2023-02-28 NOTE — TELEPHONE ENCOUNTER
From: Ashlee Ivey  To: Dr. Ponce Zhang: 2/28/2023 8:41 AM CST  Subject: Fever blister     I need a refill on my fever blister medication. If she could call me in more than one dose that would be helpful. I get them frequently.    Thank you  Unique Tolliver

## 2023-04-09 RX ORDER — BUSPIRONE HYDROCHLORIDE 15 MG/1
TABLET ORAL
Qty: 120 TABLET | Refills: 3 | Status: SHIPPED | OUTPATIENT
Start: 2023-04-09

## 2023-05-16 DIAGNOSIS — E66.09 EXOGENOUS OBESITY: ICD-10-CM

## 2023-05-16 DIAGNOSIS — E78.2 MIXED HYPERLIPIDEMIA: ICD-10-CM

## 2023-05-16 DIAGNOSIS — F41.1 GENERALIZED ANXIETY DISORDER: ICD-10-CM

## 2023-05-16 DIAGNOSIS — Z12.31 ENCOUNTER FOR SCREENING MAMMOGRAM FOR BREAST CANCER: ICD-10-CM

## 2023-05-16 DIAGNOSIS — R73.01 IFG (IMPAIRED FASTING GLUCOSE): ICD-10-CM

## 2023-05-16 DIAGNOSIS — Z00.00 ANNUAL PHYSICAL EXAM: ICD-10-CM

## 2023-05-16 DIAGNOSIS — I10 ESSENTIAL (PRIMARY) HYPERTENSION: ICD-10-CM

## 2023-05-16 LAB
ALBUMIN SERPL-MCNC: 4.1 G/DL (ref 3.5–5.2)
ALP SERPL-CCNC: 77 U/L (ref 35–104)
ALT SERPL-CCNC: 22 U/L (ref 5–33)
ANION GAP SERPL CALCULATED.3IONS-SCNC: 11 MMOL/L (ref 7–19)
AST SERPL-CCNC: 23 U/L (ref 5–32)
BILIRUB SERPL-MCNC: 0.4 MG/DL (ref 0.2–1.2)
BUN SERPL-MCNC: 13 MG/DL (ref 6–20)
CALCIUM SERPL-MCNC: 9.7 MG/DL (ref 8.6–10)
CHLORIDE SERPL-SCNC: 104 MMOL/L (ref 98–111)
CHOLEST SERPL-MCNC: 196 MG/DL (ref 160–199)
CO2 SERPL-SCNC: 26 MMOL/L (ref 22–29)
CREAT SERPL-MCNC: 0.7 MG/DL (ref 0.5–0.9)
GLUCOSE SERPL-MCNC: 112 MG/DL (ref 74–109)
HBA1C MFR BLD: 5.5 % (ref 4–6)
HDLC SERPL-MCNC: 58 MG/DL (ref 65–121)
LDLC SERPL CALC-MCNC: 113 MG/DL
POTASSIUM SERPL-SCNC: 4.5 MMOL/L (ref 3.5–5)
PROT SERPL-MCNC: 7.1 G/DL (ref 6.6–8.7)
SODIUM SERPL-SCNC: 141 MMOL/L (ref 136–145)
TRIGL SERPL-MCNC: 125 MG/DL (ref 0–149)

## 2023-05-18 ENCOUNTER — OFFICE VISIT (OUTPATIENT)
Dept: INTERNAL MEDICINE | Age: 53
End: 2023-05-18
Payer: COMMERCIAL

## 2023-05-18 VITALS
WEIGHT: 218 LBS | HEART RATE: 81 BPM | RESPIRATION RATE: 18 BRPM | DIASTOLIC BLOOD PRESSURE: 80 MMHG | OXYGEN SATURATION: 98 % | HEIGHT: 68 IN | BODY MASS INDEX: 33.04 KG/M2 | SYSTOLIC BLOOD PRESSURE: 118 MMHG

## 2023-05-18 DIAGNOSIS — R73.01 IFG (IMPAIRED FASTING GLUCOSE): ICD-10-CM

## 2023-05-18 DIAGNOSIS — E78.2 MIXED HYPERLIPIDEMIA: ICD-10-CM

## 2023-05-18 DIAGNOSIS — E66.09 EXOGENOUS OBESITY: ICD-10-CM

## 2023-05-18 DIAGNOSIS — I10 ESSENTIAL (PRIMARY) HYPERTENSION: Primary | ICD-10-CM

## 2023-05-18 DIAGNOSIS — F41.1 GENERALIZED ANXIETY DISORDER: ICD-10-CM

## 2023-05-18 PROCEDURE — 99214 OFFICE O/P EST MOD 30 MIN: CPT | Performed by: INTERNAL MEDICINE

## 2023-05-18 PROCEDURE — 3079F DIAST BP 80-89 MM HG: CPT | Performed by: INTERNAL MEDICINE

## 2023-05-18 PROCEDURE — 3074F SYST BP LT 130 MM HG: CPT | Performed by: INTERNAL MEDICINE

## 2023-05-18 ASSESSMENT — ENCOUNTER SYMPTOMS
COUGH: 0
ABDOMINAL PAIN: 0
SORE THROAT: 0
WHEEZING: 0
CONSTIPATION: 0
CHEST TIGHTNESS: 0

## 2023-05-18 NOTE — PROGRESS NOTES
Chief Complaint   Patient presents with    6 Month Follow-Up     History of presenting illness:  Kamilah Rosales is a48 y.o. female who presents today for follow up on her chronic medical conditions as noted below. 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    Generalized anxiety disorder 10/22/2017    IFG (impaired fasting glucose) 10/22/2017    Psoriasis 10/22/2017    IBS (irritable bowel syndrome) 10/22/2017    History of bone density study      Overview Note:     4/2018 normal      Abnormal liver function 08/15/2017    Foot fracture, left 08/15/2017    Foot pain 08/15/2017    Altered bowel function 10/31/2016     Overview Note:     Updating Deprecated Diagnoses      Elevated blood pressure 10/15/2016    Essential (primary) hypertension 10/15/2016     Past Medical History:   Diagnosis Date    Acquired deviated nasal septum     Anal bleeding 10/31/2016    Ankle injury     Asthma     Benign paroxysmal positional vertigo     Derangement of medial meniscus     L knee mild, clinical diagnosis 9/19/12 after turning foot and popping sound 9/16/12 while walking and turn.     Eustachian tube dysfunction     Excessive sleepiness     During the day    Generalized anxiety disorder     H/O varicose veins     History of sprain of foot     Osteoarthritis     Pain in joint of left knee     Palpitations     Psoriasis       Past Surgical History:   Procedure Laterality Date    APPENDECTOMY      BREAST SURGERY      lift and a left implant    Roque Kauffman    COLONOSCOPY N/A 12/12/2022    Dr Jose Enrique Merida, (-)Micro Colitis, sm patch of erythema in distal rectum, Mod diverticulosis left colon, int hem Gr 1, Sub prep no adequate Repeat 12-13-22    COLONOSCOPY N/A 12/13/2022    Dr Jose Enrique Merida, sm erosions in areas of biopsies

## 2023-05-30 RX ORDER — DESVENLAFAXINE 100 MG/1
TABLET, EXTENDED RELEASE ORAL
Qty: 90 TABLET | Refills: 1 | Status: SHIPPED | OUTPATIENT
Start: 2023-05-30

## 2023-05-30 NOTE — TELEPHONE ENCOUNTER
Last OV 5/18/2023  Next OV 11/20/2023      Requested Prescriptions     Pending Prescriptions Disp Refills    desvenlafaxine succinate (PRISTIQ) 100 MG TB24 extended release tablet [Pharmacy Med Name: DESVENLAFAXINE ER SUCCINATE 100MG T] 90 tablet 1     Sig: TAKE 1 TABLET BY MOUTH ONCE DAILY

## 2023-06-26 LAB
BASOPHILS # BLD: 0.1 K/UL (ref 0–0.2)
BASOPHILS NFR BLD: 0.6 % (ref 0–1)
EOSINOPHIL # BLD: 0.2 K/UL (ref 0–0.6)
EOSINOPHIL NFR BLD: 1.6 % (ref 0–5)
ERYTHROCYTE [DISTWIDTH] IN BLOOD BY AUTOMATED COUNT: 13.4 % (ref 11.5–14.5)
HBV SURFACE AB SERPL IA-ACNC: NORMAL M[IU]/ML
HBV SURFACE AG SERPL QL IA: NORMAL
HCT VFR BLD AUTO: 43.4 % (ref 37–47)
HCV AB SERPL QL IA: NORMAL
HGB BLD-MCNC: 13.7 G/DL (ref 12–16)
HIV-1 P24 AG: NORMAL
HIV1+2 AB SERPLBLD QL IA.RAPID: NORMAL
IMM GRANULOCYTES # BLD: 0 K/UL
LYMPHOCYTES # BLD: 3.4 K/UL (ref 1.1–4.5)
LYMPHOCYTES NFR BLD: 36.3 % (ref 20–40)
MCH RBC QN AUTO: 29.7 PG (ref 27–31)
MCHC RBC AUTO-ENTMCNC: 31.6 G/DL (ref 33–37)
MCV RBC AUTO: 93.9 FL (ref 81–99)
MONOCYTES # BLD: 1.1 K/UL (ref 0–0.9)
MONOCYTES NFR BLD: 11.7 % (ref 0–10)
NEUTROPHILS # BLD: 4.6 K/UL (ref 1.5–7.5)
NEUTS SEG NFR BLD: 49.5 % (ref 50–65)
PLATELET # BLD AUTO: 299 K/UL (ref 130–400)
PMV BLD AUTO: 11.5 FL (ref 9.4–12.3)
RBC # BLD AUTO: 4.62 M/UL (ref 4.2–5.4)
WBC # BLD AUTO: 9.3 K/UL (ref 4.8–10.8)

## 2023-06-28 LAB
GAMMA INTERFERON BACKGROUND BLD IA-ACNC: 0.02 IU/ML
HBV CORE AB SERPL QL IA: NEGATIVE
MITOGEN IGNF BCKGRD COR BLD-ACNC: >10 IU/ML
QUANTI TB GOLD PLUS: NEGATIVE
QUANTI TB1 MINUS NIL: 0 IU/ML (ref 0–0.34)
QUANTI TB2 MINUS NIL: 0 IU/ML (ref 0–0.34)

## 2023-07-06 RX ORDER — PANTOPRAZOLE SODIUM 40 MG/1
40 TABLET, DELAYED RELEASE ORAL DAILY
Qty: 30 TABLET | Refills: 5 | Status: SHIPPED | OUTPATIENT
Start: 2023-07-06

## 2023-08-29 ENCOUNTER — OFFICE VISIT (OUTPATIENT)
Age: 53
End: 2023-08-29
Payer: COMMERCIAL

## 2023-08-29 VITALS
BODY MASS INDEX: 32.84 KG/M2 | RESPIRATION RATE: 18 BRPM | DIASTOLIC BLOOD PRESSURE: 78 MMHG | HEART RATE: 115 BPM | OXYGEN SATURATION: 98 % | WEIGHT: 216 LBS | TEMPERATURE: 98.4 F | SYSTOLIC BLOOD PRESSURE: 128 MMHG

## 2023-08-29 DIAGNOSIS — B34.9 VIRAL ILLNESS: Primary | ICD-10-CM

## 2023-08-29 DIAGNOSIS — Z11.52 ENCOUNTER FOR SCREENING FOR COVID-19: ICD-10-CM

## 2023-08-29 DIAGNOSIS — J02.9 SORE THROAT: ICD-10-CM

## 2023-08-29 LAB
S PYO AG THROAT QL: NORMAL
SARS-COV-2 N GENE RESP QL NAA+PROBE: NOT DETECTED

## 2023-08-29 PROCEDURE — 3078F DIAST BP <80 MM HG: CPT | Performed by: NURSE PRACTITIONER

## 2023-08-29 PROCEDURE — 99213 OFFICE O/P EST LOW 20 MIN: CPT | Performed by: NURSE PRACTITIONER

## 2023-08-29 PROCEDURE — 3074F SYST BP LT 130 MM HG: CPT | Performed by: NURSE PRACTITIONER

## 2023-08-29 ASSESSMENT — ENCOUNTER SYMPTOMS
STRIDOR: 0
SHORTNESS OF BREATH: 0
COLOR CHANGE: 0
WHEEZING: 0
TROUBLE SWALLOWING: 0
CHEST TIGHTNESS: 0
EYE DISCHARGE: 0
SORE THROAT: 1
SINUS PRESSURE: 0
ABDOMINAL DISTENTION: 0
EYE PAIN: 0
COUGH: 0
ABDOMINAL PAIN: 0

## 2023-08-29 NOTE — PATIENT INSTRUCTIONS
Encourage fluids, Tylenol/Ibuprofen, OTC decongestants   Strep negative, COVID pending  If symptoms worsen or fail to improve follow-up with PCP  If SOB, chest pain, or high persistent fevers occur, go to ER    Patient verbalized understanding and agrees to plan

## 2023-08-29 NOTE — PROGRESS NOTES
ER    Patient verbalized understanding and agrees to plan   Orders Placed This Encounter   Procedures    COVID-19     Scheduling Instructions:      1) Due to current limited availability of the COVID-19 test, tests will be prioritized based on responses to questions above. Testing may be delayed due to volume. 2) Print and instruct patient to adhere to CDC home isolation program. (Link Above)              3) Set up or refer patient for a monitoring program.              4) Have patient sign up for and leverage MyChart (if not previously done). Order Specific Question:   Is this test for diagnosis or screening? Answer:   Screening     Order Specific Question:   Symptomatic for COVID-19 as defined by CDC? Answer:   No     Order Specific Question:   Date of Symptom Onset     Answer:   N/A     Order Specific Question:   Hospitalized for COVID-19? Answer:   No     Order Specific Question:   Admitted to ICU for COVID-19? Answer:   No     Order Specific Question:   Employed in healthcare setting? Answer:   Unknown     Order Specific Question:   Resident in a congregate (group) care setting? Answer:   Unknown     Order Specific Question:   Pregnant? Answer:   No     Order Specific Question:   Previously tested for COVID-19? Answer:   Yes    COVID-19    COVID-19    POCT rapid strep A       Results for orders placed or performed in visit on 08/29/23   POCT rapid strep A   Result Value Ref Range    Strep A Ag None Detected None Detected       No orders of the defined types were placed in this encounter. New Prescriptions    No medications on file        No follow-ups on file. Discussed use, benefits, and side effects of any prescribed medications. All patient questions were answered. Patient voiced understanding of care plan. Patient was given educational materials - see patient instructions below.      Patient Instructions   Encourage fluids, Tylenol/Ibuprofen, OTC

## 2023-08-30 ENCOUNTER — OFFICE VISIT (OUTPATIENT)
Dept: INTERNAL MEDICINE | Age: 53
End: 2023-08-30
Payer: COMMERCIAL

## 2023-08-30 VITALS
WEIGHT: 214 LBS | OXYGEN SATURATION: 98 % | BODY MASS INDEX: 32.54 KG/M2 | HEART RATE: 107 BPM | SYSTOLIC BLOOD PRESSURE: 122 MMHG | TEMPERATURE: 98.4 F | DIASTOLIC BLOOD PRESSURE: 84 MMHG

## 2023-08-30 DIAGNOSIS — J01.90 ACUTE SINUSITIS, RECURRENCE NOT SPECIFIED, UNSPECIFIED LOCATION: Primary | ICD-10-CM

## 2023-08-30 PROCEDURE — 3079F DIAST BP 80-89 MM HG: CPT | Performed by: NURSE PRACTITIONER

## 2023-08-30 PROCEDURE — 96372 THER/PROPH/DIAG INJ SC/IM: CPT | Performed by: NURSE PRACTITIONER

## 2023-08-30 PROCEDURE — 3074F SYST BP LT 130 MM HG: CPT | Performed by: NURSE PRACTITIONER

## 2023-08-30 PROCEDURE — 99213 OFFICE O/P EST LOW 20 MIN: CPT | Performed by: NURSE PRACTITIONER

## 2023-08-30 RX ORDER — DEXAMETHASONE SODIUM PHOSPHATE 10 MG/ML
10 INJECTION INTRAMUSCULAR; INTRAVENOUS ONCE
Status: COMPLETED | OUTPATIENT
Start: 2023-08-30 | End: 2023-08-30

## 2023-08-30 RX ORDER — AMOXICILLIN AND CLAVULANATE POTASSIUM 875; 125 MG/1; MG/1
1 TABLET, FILM COATED ORAL 2 TIMES DAILY
Qty: 20 TABLET | Refills: 0 | Status: SHIPPED | OUTPATIENT
Start: 2023-08-30 | End: 2023-09-09

## 2023-08-30 RX ADMIN — DEXAMETHASONE SODIUM PHOSPHATE 10 MG: 10 INJECTION INTRAMUSCULAR; INTRAVENOUS at 10:53

## 2023-08-30 ASSESSMENT — ENCOUNTER SYMPTOMS
SINUS PRESSURE: 1
SINUS PAIN: 1
SORE THROAT: 1
COUGH: 1

## 2023-08-30 NOTE — PROGRESS NOTES
After obtaining consent, and per orders of Savanna Fong, injection of Dex10 given in Dorsogluteal Right by Junior Brigitte MA.

## 2023-10-16 ENCOUNTER — OFFICE VISIT (OUTPATIENT)
Dept: INTERNAL MEDICINE | Age: 53
End: 2023-10-16
Payer: COMMERCIAL

## 2023-10-16 VITALS
HEART RATE: 95 BPM | OXYGEN SATURATION: 97 % | BODY MASS INDEX: 33.19 KG/M2 | HEIGHT: 68 IN | WEIGHT: 219 LBS | SYSTOLIC BLOOD PRESSURE: 118 MMHG | DIASTOLIC BLOOD PRESSURE: 70 MMHG

## 2023-10-16 DIAGNOSIS — Z23 NEED FOR VACCINATION: ICD-10-CM

## 2023-10-16 DIAGNOSIS — I10 ESSENTIAL (PRIMARY) HYPERTENSION: ICD-10-CM

## 2023-10-16 DIAGNOSIS — F41.1 GENERALIZED ANXIETY DISORDER: Primary | ICD-10-CM

## 2023-10-16 PROCEDURE — 90471 IMMUNIZATION ADMIN: CPT | Performed by: INTERNAL MEDICINE

## 2023-10-16 PROCEDURE — 3078F DIAST BP <80 MM HG: CPT | Performed by: INTERNAL MEDICINE

## 2023-10-16 PROCEDURE — 99213 OFFICE O/P EST LOW 20 MIN: CPT | Performed by: INTERNAL MEDICINE

## 2023-10-16 PROCEDURE — 3074F SYST BP LT 130 MM HG: CPT | Performed by: INTERNAL MEDICINE

## 2023-10-16 PROCEDURE — 90674 CCIIV4 VAC NO PRSV 0.5 ML IM: CPT | Performed by: INTERNAL MEDICINE

## 2023-10-16 RX ORDER — BUSPIRONE HYDROCHLORIDE 30 MG/1
30 TABLET ORAL EVERY 12 HOURS
Qty: 60 TABLET | Refills: 2 | Status: SHIPPED | OUTPATIENT
Start: 2023-10-16

## 2023-10-16 ASSESSMENT — ENCOUNTER SYMPTOMS
CHEST TIGHTNESS: 0
CONSTIPATION: 0
COUGH: 0
WHEEZING: 0
SORE THROAT: 0
ABDOMINAL PAIN: 0

## 2023-10-16 ASSESSMENT — ANXIETY QUESTIONNAIRES
1. FEELING NERVOUS, ANXIOUS, OR ON EDGE: 3
3. WORRYING TOO MUCH ABOUT DIFFERENT THINGS: 1
4. TROUBLE RELAXING: 1
2. NOT BEING ABLE TO STOP OR CONTROL WORRYING: 1
6. BECOMING EASILY ANNOYED OR IRRITABLE: 3
IF YOU CHECKED OFF ANY PROBLEMS ON THIS QUESTIONNAIRE, HOW DIFFICULT HAVE THESE PROBLEMS MADE IT FOR YOU TO DO YOUR WORK, TAKE CARE OF THINGS AT HOME, OR GET ALONG WITH OTHER PEOPLE: SOMEWHAT DIFFICULT
GAD7 TOTAL SCORE: 10
5. BEING SO RESTLESS THAT IT IS HARD TO SIT STILL: 1
7. FEELING AFRAID AS IF SOMETHING AWFUL MIGHT HAPPEN: 0

## 2023-10-16 NOTE — PROGRESS NOTES
Chief Complaint   Patient presents with    Other     Increased anxiety      History of presenting illness:  Jony Carrasquillo is a50 y.o. female who presents today for follow up on her chronic medical conditions as noted below. 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    Generalized anxiety disorder 10/22/2017    IFG (impaired fasting glucose) 10/22/2017    Psoriasis 10/22/2017    IBS (irritable bowel syndrome) 10/22/2017    History of bone density study      Overview Note:     4/2018 normal      Abnormal liver function 08/15/2017    Foot fracture, left 08/15/2017    Foot pain 08/15/2017    Altered bowel function 10/31/2016     Overview Note:     Updating Deprecated Diagnoses      Elevated blood pressure 10/15/2016    Essential (primary) hypertension 10/15/2016     Past Medical History:   Diagnosis Date    Acquired deviated nasal septum     Anal bleeding 10/31/2016    Ankle injury     Asthma     Benign paroxysmal positional vertigo     Derangement of medial meniscus     L knee mild, clinical diagnosis 9/19/12 after turning foot and popping sound 9/16/12 while walking and turn.     Eustachian tube dysfunction     Excessive sleepiness     During the day    Generalized anxiety disorder     H/O varicose veins     History of sprain of foot     Osteoarthritis     Pain in joint of left knee     Palpitations     Psoriasis       Past Surgical History:   Procedure Laterality Date    APPENDECTOMY      BREAST SURGERY      lift and a left implant    Opal Cotton    COLONOSCOPY N/A 12/12/2022    Dr Brigida Triana, (-)Micro Colitis, sm patch of erythema in distal rectum, Mod diverticulosis left colon, int hem Gr 1, Sub prep no adequate Repeat 12-13-22    COLONOSCOPY N/A 12/13/2022    Dr Brigida Triana, sm erosions in areas of

## 2023-10-25 ENCOUNTER — HOSPITAL ENCOUNTER (EMERGENCY)
Facility: HOSPITAL | Age: 53
Discharge: HOME | End: 2023-10-25
Payer: OTHER MISCELLANEOUS

## 2023-10-25 ENCOUNTER — APPOINTMENT (OUTPATIENT)
Dept: GENERAL RADIOLOGY | Facility: HOSPITAL | Age: 53
End: 2023-10-25
Payer: OTHER MISCELLANEOUS

## 2023-10-25 VITALS
DIASTOLIC BLOOD PRESSURE: 72 MMHG | BODY MASS INDEX: 33.19 KG/M2 | HEIGHT: 68 IN | TEMPERATURE: 98.1 F | OXYGEN SATURATION: 99 % | RESPIRATION RATE: 16 BRPM | SYSTOLIC BLOOD PRESSURE: 148 MMHG | HEART RATE: 74 BPM | WEIGHT: 219 LBS

## 2023-10-25 DIAGNOSIS — S62.001A CLOSED NONDISPLACED FRACTURE OF SCAPHOID OF RIGHT WRIST, UNSPECIFIED PORTION OF SCAPHOID, INITIAL ENCOUNTER: Primary | ICD-10-CM

## 2023-10-25 PROCEDURE — 99282 EMERGENCY DEPT VISIT SF MDM: CPT

## 2023-10-25 PROCEDURE — 73130 X-RAY EXAM OF HAND: CPT

## 2023-10-25 PROCEDURE — 73110 X-RAY EXAM OF WRIST: CPT

## 2023-10-25 RX ORDER — HYDROCODONE BITARTRATE AND ACETAMINOPHEN 5; 325 MG/1; MG/1
1 TABLET ORAL EVERY 6 HOURS PRN
Qty: 15 TABLET | Refills: 0 | Status: SHIPPED | OUTPATIENT
Start: 2023-10-25

## 2023-10-25 NOTE — Clinical Note
[facility] EMERGENCY DEPARTMENT  [address]  [place] [address]  Phone: [phone]    Katlyn Burger was seen and treated in our emergency department on 10/25/2023.  She may return to work on 10/26/2023.  Light duty until follows up with orthopedics        Thank you for choosing [facility].    Shauna Olsen, APRN

## 2023-10-25 NOTE — DISCHARGE INSTRUCTIONS
Return to ER if symptoms worsen   Elevate/ ice  Follow up with Dr. Messina  Keep splint clean and dry

## 2023-10-25 NOTE — ED PROVIDER NOTES
Subjective   History of Present Illness  Patient is a 53-year-old female presents emergency department with right wrist and hand pain.  She states she was at work and was coming down off of a ladder and she thought she was stepping onto the ground however she was stepping on the last step of the ladder and slipped and fell.  She states she landed on her right wrist.  She is having right wrist and hand pain.  She states this occurred about an hour prior to arrival.  She denies any head or neck injury.  She denies any other complaints of pain.    History provided by:  Patient   used: No        Review of Systems   Constitutional: Negative.    HENT: Negative.     Eyes: Negative.    Respiratory: Negative.     Cardiovascular: Negative.    Gastrointestinal: Negative.    Endocrine: Negative.    Genitourinary: Negative.    Musculoskeletal:         Patient is a 53-year-old female presents emergency department with right wrist and hand pain.  She states she was at work and was coming down off of a ladder and she thought she was stepping onto the ground however she was stepping on the last step of the ladder and slipped and fell.  She states she landed on her right wrist.  She is having right wrist and hand pain.  She states this occurred about an hour prior to arrival.  She denies any head or neck injury.  She denies any other complaints of pain.     Skin: Negative.    Allergic/Immunologic: Negative.    Neurological: Negative.    Hematological: Negative.    Psychiatric/Behavioral: Negative.     All other systems reviewed and are negative.      Past Medical History:   Diagnosis Date    Anxiety     Arthritis     Depression        No Known Allergies    Past Surgical History:   Procedure Laterality Date    APPENDECTOMY      AUGMENTATION MAMMAPLASTY Left 2007    pt had a lift and implant on the left side only    BREAST SURGERY Left     lifted and implant    KNEE SURGERY      SINUS SURGERY      TONSILLECTOMY    "      Family History   Problem Relation Age of Onset    Anuerysm Mother     Diabetes Father     Breast cancer Paternal Aunt     Breast cancer Paternal Aunt        Social History     Socioeconomic History    Marital status:    Tobacco Use    Smoking status: Never   Substance and Sexual Activity    Alcohol use: Not Currently    Drug use: No       Prior to Admission medications    Medication Sig Start Date End Date Taking? Authorizing Provider   Ascorbic Acid (VITAMIN C PO) Take  by mouth Daily.    Emergency, Nurse [facility]   busPIRone (BUSPAR) 15 MG tablet Take 1 tablet by mouth 2 (Two) Times a Day.    Emergency, Nurse [facility]   cetirizine (zyrTEC) 10 MG tablet Take 1 tablet by mouth Daily.    ProviderSarai MD   ciprofloxacin (CIPRO) 250 MG tablet Take 1 tablet by mouth 2 (Two) Times a Day. 10/17/16   Koko Schmitz MD   desvenlafaxine (PRISTIQ) 50 MG 24 hr tablet Take 1 tablet by mouth Daily.    ProviderSarai MD   meloxicam (MOBIC) 15 MG tablet Take 15 mg by mouth Daily.    Emergency, Nurse [facility], RN   Multiple Vitamins-Minerals (VITEYES AREDS ADVANCED PO) Take  by mouth.    Provider, MD aSrai   Naproxen Sodium (ALEVE PO) Take 500 mg by mouth 2 (Two) Times a Day.    ProviderSarai MD   ondansetron ODT (ZOFRAN-ODT) 4 MG disintegrating tablet Place 1 tablet on the tongue Every 6 (Six) Hours As Needed for Nausea. 10/25/22   Aurora Wynn APRN   pantoprazole (PROTONIX) 40 MG EC tablet Take 1 tablet by mouth Daily. 10/25/22   Aurora Wynn APRN   venlafaxine (EFFEXOR) 75 MG tablet Take 75 mg by mouth Daily.    Emergency, Nurse [facility], RN   VITAMIN D, CHOLECALCIFEROL, PO Take  by mouth Daily.    Emergency, Nurse [facility]       /72   Pulse 74   Temp 98.1 °F (36.7 °C)   Resp 16   Ht 172.7 cm (68\")   Wt 99.3 kg (219 lb)   LMP 09/26/2016   SpO2 99%   BMI 33.30 kg/m²     Objective   Physical Exam  Vitals and nursing note reviewed.   Constitutional:       " Appearance: She is well-developed.      Comments: Nontoxic-appearing.  No acute distress.   HENT:      Head: Normocephalic and atraumatic.   Eyes:      Conjunctiva/sclera: Conjunctivae normal.      Pupils: Pupils are equal, round, and reactive to light.   Neck:      Thyroid: No thyromegaly.      Trachea: No tracheal deviation.   Cardiovascular:      Rate and Rhythm: Normal rate and regular rhythm.      Heart sounds: Normal heart sounds.   Pulmonary:      Effort: Pulmonary effort is normal. No respiratory distress.      Breath sounds: Normal breath sounds. No wheezing or rales.   Chest:      Chest wall: No tenderness.   Abdominal:      General: Bowel sounds are normal.      Palpations: Abdomen is soft.   Musculoskeletal:      Cervical back: Normal range of motion and neck supple.      Comments: Right upper extremity: Pain on palpation of the distal wrist.  There is moderate amount of ecchymosis noted to the volar aspect of the wrist.  Mild soft tissue swelling.  Peripheral pulses are palpable.  No obvious deformity noted.  Flexion extension is intact to all digits.  Able to make fist without difficulty. Patient has tenderness over the scaphoid area on palpation.  Patient is right-hand dominant.   Skin:     General: Skin is warm and dry.   Neurological:      Mental Status: She is alert and oriented to person, place, and time.      Cranial Nerves: No cranial nerve deficit.      Deep Tendon Reflexes: Reflexes are normal and symmetric.   Psychiatric:         Behavior: Behavior normal.         Thought Content: Thought content normal.         Judgment: Judgment normal.         Splint - Cast - Strapping    Date/Time: 10/25/2023 4:14 PM    Performed by: Shauna Olsen APRN  Authorized by: Shauna Olsen APRN    Consent:     Consent obtained:  Verbal and written    Consent given by:  Patient    Risks, benefits, and alternatives were discussed: yes      Risks discussed:  Discoloration, numbness, pain and  swelling    Alternatives discussed:  No treatment  Universal protocol:     Procedure explained and questions answered to patient or proxy's satisfaction: yes      Relevant documents present and verified: yes      Test results available: yes      Imaging studies available: yes      Patient identity confirmed:  Verbally with patient, arm band and provided demographic data  Pre-procedure details:     Distal neurologic exam:  Normal    Distal perfusion: distal pulses strong    Procedure details:     Location:  Hand    Hand location:  R hand    Splint type:  Thumb spica    Supplies:  Cotton padding  Post-procedure details:     Distal neurologic exam:  Normal    Distal perfusion: brisk capillary refill      Procedure completion:  Tolerated well, no immediate complications           Lab Results (last 24 hours)       ** No results found for the last 24 hours. **            XR Hand 3+ View Right   Final Result   Fracture involving the lateral cortex of the scaphoid waist,   acute versus chronic and this fracture may extend across the width of   the scaphoid bone. Correlation with any point tenderness in this region   is recommended.       This report was signed and finalized on 10/25/2023 3:38 PM CDT by Dr. Dewayne Jones MD.          XR Wrist 3+ View Right   Final Result   Lucency involving the lateral cortex of the scaphoid bone   and possibly extending across the scaphoid waist compatible with a   fracture, acute versus chronic. Correlation clinically with any focal   point tenderness over the scaphoid is recommended. The joint spaces are   preserved       This report was signed and finalized on 10/25/2023 3:35 PM CDT by Dr. Dewayne Jones MD.              ED Course  ED Course as of 10/25/23 1747   Wed Oct 25, 2023   3666 IMPRESSION:  Fracture involving the lateral cortex of the scaphoid waist,  acute versus chronic and this fracture may extend across the width of  the scaphoid bone. Correlation with any point  tenderness in this region  is recommended.     This report was signed and finalized on 10/25/2023 3:38 PM CDT by Dr. Dewayne Jones MD.      [CW]   5960 MPRESSION:  Lucency involving the lateral cortex of the scaphoid bone  and possibly extending across the scaphoid waist compatible with a  fracture, acute versus chronic. Correlation clinically with any focal  point tenderness over the scaphoid is recommended. The joint spaces are  preserved     This report was signed and finalized on 10/25/2023    [CW]   9246 Reviewed results of testing with patient. Call placed to orthopedics at this time for further  [CW]   6324 Spoke with Dr. Alonso with orthopedics.  Will place patient in a thumb spica splint and have her follow-up with Dr. Messina. [CW]   0276 Will send pt home with small amt of pain medication for acute pain. Reviewed side effects and potential for abuse. Antonio report completed and no signs of suspicious activity noted.  [CW]      ED Course User Index  [CW] Shauna Olsen APRN        Medical Decision Making  Patient is a 53-year-old female presents emergency department with right wrist and hand pain.  She states she was at work and was coming down off of a ladder and she thought she was stepping onto the ground however she was stepping on the last step of the ladder and slipped and fell.  She states she landed on her right wrist.  She is having right wrist and hand pain.  She states this occurred about an hour prior to arrival.  She denies any head or neck injury.  She denies any other complaints of pain.  Course of treatment in the er: Patient had tenderness on palpation of the distal wrist over the scaphoid area.  She had active range of motion of the fingers.  Flexion extension was intact to all digits.  Peripheral pulses are palpable.  There is no obvious deformity noted.  Patient is right-hand dominant.  Her x-rays indicate a right scaphoid fracture.  Spoke with Dr. Alonso on-call for orthopedics.   We will place the patient in a thumb spica splint and prescribe pain medication.  Advised patient to follow-up with Dr. Messina with orthopedic [place].  Advised the patient to elevate and ice.  Keep splint clean and dry.  Light duty at work until she follows up with orthopedics.  She is in agreement with the care plan.  She will be discharged shortly in stable condition.  Differential dx: Wrist fracture, hand fracture, subluxation, dislocation    Problems Addressed:  Closed nondisplaced fracture of scaphoid of right wrist, unspecified portion of scaphoid, initial encounter: complicated acute illness or injury    Amount and/or Complexity of Data Reviewed  Radiology: ordered. Decision-making details documented in ED Course.    Risk  Prescription drug management.         Final diagnoses:   Closed nondisplaced fracture of scaphoid of right wrist, unspecified portion of scaphoid, initial encounter          Shauna Olsen, APRN  10/25/23 3499

## 2023-11-14 ENCOUNTER — OFFICE VISIT (OUTPATIENT)
Dept: INTERNAL MEDICINE | Age: 53
End: 2023-11-14
Payer: COMMERCIAL

## 2023-11-14 VITALS — HEART RATE: 96 BPM | OXYGEN SATURATION: 99 % | TEMPERATURE: 102 F

## 2023-11-14 DIAGNOSIS — U07.1 COVID: Primary | ICD-10-CM

## 2023-11-14 LAB
APPEARANCE FLUID: CLEAR
BILIRUBIN, POC: NORMAL
BLOOD URINE, POC: NORMAL
CLARITY, POC: CLEAR
COLOR, POC: YELLOW
GLUCOSE URINE, POC: NORMAL
INFLUENZA A ANTIGEN, POC: NEGATIVE
INFLUENZA B ANTIGEN, POC: NEGATIVE
KETONES, POC: NORMAL
LEUKOCYTE EST, POC: NORMAL
LOT EXPIRE DATE: ABNORMAL
LOT KIT NUMBER: ABNORMAL
NITRITE, POC: NORMAL
PH, POC: 7
PROTEIN, POC: NORMAL
SARS-COV-2, POC: DETECTED
SPECIFIC GRAVITY, POC: 1.03
UROBILINOGEN, POC: 1
VALID INTERNAL CONTROL: YES
VENDOR AND KIT NAME POC: ABNORMAL

## 2023-11-14 PROCEDURE — 99203 OFFICE O/P NEW LOW 30 MIN: CPT | Performed by: NURSE PRACTITIONER

## 2023-11-14 PROCEDURE — 96372 THER/PROPH/DIAG INJ SC/IM: CPT | Performed by: NURSE PRACTITIONER

## 2023-11-14 PROCEDURE — 81002 URINALYSIS NONAUTO W/O SCOPE: CPT | Performed by: NURSE PRACTITIONER

## 2023-11-14 RX ORDER — METHYLPREDNISOLONE ACETATE 80 MG/ML
80 INJECTION, SUSPENSION INTRA-ARTICULAR; INTRALESIONAL; INTRAMUSCULAR; SOFT TISSUE ONCE
Status: COMPLETED | OUTPATIENT
Start: 2023-11-14 | End: 2023-11-14

## 2023-11-14 RX ADMIN — METHYLPREDNISOLONE ACETATE 80 MG: 80 INJECTION, SUSPENSION INTRA-ARTICULAR; INTRALESIONAL; INTRAMUSCULAR; SOFT TISSUE at 12:59

## 2023-11-14 ASSESSMENT — ENCOUNTER SYMPTOMS
ABDOMINAL PAIN: 0
BLOOD IN STOOL: 0
COUGH: 1
DIARRHEA: 0
COLOR CHANGE: 0
CONSTIPATION: 0
NAUSEA: 0
STRIDOR: 0
SORE THROAT: 0
VOMITING: 0
SHORTNESS OF BREATH: 0
EYE DISCHARGE: 0
TROUBLE SWALLOWING: 0
EYE ITCHING: 0
ABDOMINAL DISTENTION: 0
CHOKING: 0
WHEEZING: 0

## 2023-11-14 NOTE — PATIENT INSTRUCTIONS
COVID with Fever and body aches;    alternate tylenol and ibuprofen (4) every 4 hours for fever and body aches;      Saline nasal spray 4 times a day both nares for 7 days  Steroid spray, rhinocort, nasocort, nasonex, flonase twice daily about 5 minutes after the saline   mucinex 1200 mg twice daily for 7 days with plenty of water  Delsym cough syrup up to 3 times daily as needed for cough   Ricola cough drops, honey lemon in pink bag;  has echanesia to help build your immunity

## 2023-11-14 NOTE — PROGRESS NOTES
[facility] INTERNAL MEDICINE  [address]  [place] [address]  Dept: [phone]  Dept Fax: [phone]: [phone]    Tigre Tirado (:  1970) is a 52 y.o. female,Established patient  with Sydell Loose , here for evaluation of the following chief complaint(s): Maria Elena Dominguez is a 52 y.o. female who presents today for her medical conditions/complaints as noted below. Tigre Tirado is c/rk Congestion        HPI:     Chief Complaint   Patient presents with    Congestion     @pt is alone     HPI    Fever with cough and body aches;  these symptoms started about 3 pm;   she is positive for COVID in the office today    Dysuria;  but urine is clear in the office POC:        Past Medical History:   Diagnosis Date    Acquired deviated nasal septum     Anal bleeding 10/31/2016    Ankle injury     Asthma     Benign paroxysmal positional vertigo     Derangement of medial meniscus     L knee mild, clinical diagnosis 12 after turning foot and popping sound 12 while walking and turn.     Eustachian tube dysfunction     Excessive sleepiness     During the day    Generalized anxiety disorder     H/O varicose veins     History of sprain of foot     Osteoarthritis     Pain in joint of left knee     Palpitations     Psoriasis       Past Surgical History:   Procedure Laterality Date    APPENDECTOMY      BREAST SURGERY      lift and a left implant    [facility]    Dr. Luther Noriega    COLONOSCOPY N/A 2022    Dr Lebron Wiley, (-)Micro Colitis, sm patch of erythema in distal rectum, Mod diverticulosis left colon, int hem Gr 1, Sub prep no adequate Repeat 22    COLONOSCOPY N/A 2022    Dr Lebron Wiley, sm erosions in areas of biopsies done from day previous, sm ulcerations wo bleeding at site of polypectomy, tortuous redundant colon, int hem Gr 1 likely recent blood from rectum but wo at

## 2023-11-14 NOTE — ASSESSMENT & PLAN NOTE
Medrol 80 IM      Saline nasal spray 4 times a day both nares for 7 days  Steroid spray, rhinocort, nasocort, nasonex, flonase twice daily about 5 minutes after the saline   mucinex 1200 mg twice daily for 7 days with plenty of water  Delsym cough syrup up to 3 times daily as needed for cough   Ricola cough drops, honey lemon in pink bag;  has echanesia to help build your immunity

## 2023-11-21 ENCOUNTER — OFFICE VISIT (OUTPATIENT)
Dept: INTERNAL MEDICINE | Age: 53
End: 2023-11-21
Payer: COMMERCIAL

## 2023-11-21 VITALS
HEIGHT: 68 IN | BODY MASS INDEX: 33.19 KG/M2 | SYSTOLIC BLOOD PRESSURE: 120 MMHG | WEIGHT: 219 LBS | DIASTOLIC BLOOD PRESSURE: 82 MMHG | HEART RATE: 74 BPM | OXYGEN SATURATION: 99 %

## 2023-11-21 DIAGNOSIS — I10 ESSENTIAL (PRIMARY) HYPERTENSION: ICD-10-CM

## 2023-11-21 DIAGNOSIS — R30.0 DYSURIA: ICD-10-CM

## 2023-11-21 DIAGNOSIS — F41.1 GENERALIZED ANXIETY DISORDER: ICD-10-CM

## 2023-11-21 DIAGNOSIS — E78.2 MIXED HYPERLIPIDEMIA: ICD-10-CM

## 2023-11-21 DIAGNOSIS — E66.09 EXOGENOUS OBESITY: ICD-10-CM

## 2023-11-21 DIAGNOSIS — R73.01 IFG (IMPAIRED FASTING GLUCOSE): ICD-10-CM

## 2023-11-21 DIAGNOSIS — N34.2 INFECTIVE URETHRITIS: Primary | ICD-10-CM

## 2023-11-21 DIAGNOSIS — R31.9 HEMATURIA, UNSPECIFIED TYPE: ICD-10-CM

## 2023-11-21 LAB
ALBUMIN SERPL-MCNC: 4.5 G/DL (ref 3.5–5.2)
ALP SERPL-CCNC: 95 U/L (ref 35–104)
ALT SERPL-CCNC: 23 U/L (ref 5–33)
ANION GAP SERPL CALCULATED.3IONS-SCNC: 13 MMOL/L (ref 7–19)
APPEARANCE FLUID: ABNORMAL
AST SERPL-CCNC: 19 U/L (ref 5–32)
BACTERIA URNS QL MICRO: ABNORMAL /HPF
BASOPHILS # BLD: 0.1 K/UL (ref 0–0.2)
BASOPHILS NFR BLD: 0.5 % (ref 0–1)
BILIRUB SERPL-MCNC: 0.4 MG/DL (ref 0.2–1.2)
BILIRUB UR QL STRIP: NEGATIVE
BILIRUBIN, POC: ABNORMAL
BLOOD URINE, POC: ABNORMAL
BUN SERPL-MCNC: 15 MG/DL (ref 6–20)
CALCIUM SERPL-MCNC: 9.7 MG/DL (ref 8.6–10)
CHARACTER UR: ABNORMAL
CHLORIDE SERPL-SCNC: 101 MMOL/L (ref 98–111)
CHOLEST SERPL-MCNC: 213 MG/DL (ref 160–199)
CLARITY UR: ABNORMAL
CLARITY, POC: ABNORMAL
CO2 SERPL-SCNC: 26 MMOL/L (ref 22–29)
COLOR UR: YELLOW
COLOR, POC: ABNORMAL
CREAT SERPL-MCNC: 0.7 MG/DL (ref 0.5–0.9)
CRYSTALS URNS MICRO: ABNORMAL /HPF
EOSINOPHIL # BLD: 0.1 K/UL (ref 0–0.6)
EOSINOPHIL NFR BLD: 1.1 % (ref 0–5)
ERYTHROCYTE [DISTWIDTH] IN BLOOD BY AUTOMATED COUNT: 13.2 % (ref 11.5–14.5)
GLUCOSE SERPL-MCNC: 95 MG/DL (ref 74–109)
GLUCOSE UR STRIP.AUTO-MCNC: NEGATIVE MG/DL
GLUCOSE URINE, POC: ABNORMAL
HBA1C MFR BLD: 5.8 % (ref 4–6)
HCT VFR BLD AUTO: 49.3 % (ref 37–47)
HDLC SERPL-MCNC: 64 MG/DL (ref 65–121)
HGB BLD-MCNC: 15.4 G/DL (ref 12–16)
HGB UR STRIP.AUTO-MCNC: ABNORMAL MG/L
IMM GRANULOCYTES # BLD: 0 K/UL
KETONES UR STRIP.AUTO-MCNC: NEGATIVE MG/DL
KETONES, POC: ABNORMAL
LDLC SERPL CALC-MCNC: 125 MG/DL
LEUKOCYTE EST, POC: ABNORMAL
LEUKOCYTE ESTERASE UR QL STRIP.AUTO: ABNORMAL
LYMPHOCYTES # BLD: 3.4 K/UL (ref 1.1–4.5)
LYMPHOCYTES NFR BLD: 32.6 % (ref 20–40)
MCH RBC QN AUTO: 29.6 PG (ref 27–31)
MCHC RBC AUTO-ENTMCNC: 31.2 G/DL (ref 33–37)
MCV RBC AUTO: 94.6 FL (ref 81–99)
MONOCYTES # BLD: 0.9 K/UL (ref 0–0.9)
MONOCYTES NFR BLD: 8.9 % (ref 0–10)
NEUTROPHILS # BLD: 5.9 K/UL (ref 1.5–7.5)
NEUTS SEG NFR BLD: 56.6 % (ref 50–65)
NITRITE UR QL STRIP.AUTO: NEGATIVE
NITRITE, POC: ABNORMAL
PH UR STRIP.AUTO: 6 [PH] (ref 5–8)
PH, POC: 5
PLATELET # BLD AUTO: 340 K/UL (ref 130–400)
PMV BLD AUTO: 10.8 FL (ref 9.4–12.3)
POTASSIUM SERPL-SCNC: 4.7 MMOL/L (ref 3.5–5)
PROT SERPL-MCNC: 8.3 G/DL (ref 6.6–8.7)
PROT UR STRIP.AUTO-MCNC: 30 MG/DL
PROTEIN, POC: ABNORMAL
RBC # BLD AUTO: 5.21 M/UL (ref 4.2–5.4)
SODIUM SERPL-SCNC: 140 MMOL/L (ref 136–145)
SP GR UR STRIP.AUTO: 1.02 (ref 1–1.03)
SPECIFIC GRAVITY, POC: 1.02
TRIGL SERPL-MCNC: 121 MG/DL (ref 0–149)
TSH SERPL DL<=0.005 MIU/L-ACNC: 1.07 UIU/ML (ref 0.27–4.2)
UROBILINOGEN UR STRIP.AUTO-MCNC: 1 E.U./DL
UROBILINOGEN, POC: 0.2
WBC # BLD AUTO: 10.4 K/UL (ref 4.8–10.8)
WBC #/AREA URNS HPF: ABNORMAL /HPF (ref 0–5)

## 2023-11-21 PROCEDURE — 3079F DIAST BP 80-89 MM HG: CPT | Performed by: INTERNAL MEDICINE

## 2023-11-21 PROCEDURE — 3074F SYST BP LT 130 MM HG: CPT | Performed by: INTERNAL MEDICINE

## 2023-11-21 PROCEDURE — 81002 URINALYSIS NONAUTO W/O SCOPE: CPT | Performed by: INTERNAL MEDICINE

## 2023-11-21 PROCEDURE — 99213 OFFICE O/P EST LOW 20 MIN: CPT | Performed by: INTERNAL MEDICINE

## 2023-11-21 RX ORDER — CIPROFLOXACIN 250 MG/1
250 TABLET, FILM COATED ORAL 2 TIMES DAILY
Qty: 10 TABLET | Refills: 0 | Status: SHIPPED | OUTPATIENT
Start: 2023-11-21 | End: 2023-11-26

## 2023-11-21 RX ORDER — PHENAZOPYRIDINE HYDROCHLORIDE 100 MG/1
100 TABLET, FILM COATED ORAL 3 TIMES DAILY PRN
Qty: 9 TABLET | Refills: 0 | Status: SHIPPED | OUTPATIENT
Start: 2023-11-21 | End: 2023-11-24

## 2023-11-21 ASSESSMENT — ENCOUNTER SYMPTOMS
CONSTIPATION: 0
WHEEZING: 0
ABDOMINAL PAIN: 0
CHEST TIGHTNESS: 0
COUGH: 0
SORE THROAT: 0

## 2023-11-21 NOTE — PROGRESS NOTES
times, nonsensical words or phrases may be inadvertently transcribed.  Although I have reviewed the note for sucherrors, some may still exist.

## 2023-11-29 ENCOUNTER — OFFICE VISIT (OUTPATIENT)
Dept: INTERNAL MEDICINE | Age: 53
End: 2023-11-29
Payer: COMMERCIAL

## 2023-11-29 VITALS
HEART RATE: 96 BPM | WEIGHT: 213.2 LBS | DIASTOLIC BLOOD PRESSURE: 80 MMHG | BODY MASS INDEX: 32.31 KG/M2 | OXYGEN SATURATION: 97 % | HEIGHT: 68 IN | SYSTOLIC BLOOD PRESSURE: 112 MMHG

## 2023-11-29 DIAGNOSIS — F41.1 GENERALIZED ANXIETY DISORDER: ICD-10-CM

## 2023-11-29 DIAGNOSIS — Z00.00 ANNUAL PHYSICAL EXAM: Primary | ICD-10-CM

## 2023-11-29 DIAGNOSIS — E66.09 EXOGENOUS OBESITY: ICD-10-CM

## 2023-11-29 DIAGNOSIS — R73.01 IFG (IMPAIRED FASTING GLUCOSE): ICD-10-CM

## 2023-11-29 DIAGNOSIS — I10 ESSENTIAL (PRIMARY) HYPERTENSION: ICD-10-CM

## 2023-11-29 DIAGNOSIS — L40.9 PSORIASIS: ICD-10-CM

## 2023-11-29 DIAGNOSIS — R31.29 MICROHEMATURIA: ICD-10-CM

## 2023-11-29 DIAGNOSIS — Z12.31 ENCOUNTER FOR SCREENING MAMMOGRAM FOR MALIGNANT NEOPLASM OF BREAST: ICD-10-CM

## 2023-11-29 DIAGNOSIS — E78.2 MIXED HYPERLIPIDEMIA: ICD-10-CM

## 2023-11-29 DIAGNOSIS — Z78.0 MENOPAUSE: ICD-10-CM

## 2023-11-29 PROCEDURE — 99396 PREV VISIT EST AGE 40-64: CPT | Performed by: INTERNAL MEDICINE

## 2023-11-29 PROCEDURE — 3079F DIAST BP 80-89 MM HG: CPT | Performed by: INTERNAL MEDICINE

## 2023-11-29 PROCEDURE — 3074F SYST BP LT 130 MM HG: CPT | Performed by: INTERNAL MEDICINE

## 2023-11-29 ASSESSMENT — ENCOUNTER SYMPTOMS
CONSTIPATION: 0
CHEST TIGHTNESS: 0
ABDOMINAL PAIN: 0
COUGH: 0
SORE THROAT: 0
WHEEZING: 0

## 2023-11-29 NOTE — PROGRESS NOTES
SITES    Hemoglobin A1C    Comprehensive Metabolic Panel    Lipid Panel     New Prescriptions    No medications on file      There are no Patient Instructions on file for this visit. No follow-ups on file. EMR Dragon/transcription disclaimer:Significant part of this  encounter note is electronic transcription/translation of spoken language to printed text. The electronic translation of spoken language may beerroneous, or at times, nonsensical words or phrases may be inadvertently transcribed.  Although I have reviewed the note for such errors, some may still exist.

## 2023-11-30 ENCOUNTER — TRANSCRIBE ORDERS (OUTPATIENT)
Dept: ADMINISTRATIVE | Facility: HOSPITAL | Age: 53
End: 2023-11-30
Payer: COMMERCIAL

## 2023-11-30 DIAGNOSIS — Z12.31 ENCOUNTER FOR SCREENING MAMMOGRAM FOR MALIGNANT NEOPLASM OF BREAST: Primary | ICD-10-CM

## 2023-12-05 ENCOUNTER — HOSPITAL ENCOUNTER (OUTPATIENT)
Dept: WOMENS IMAGING | Age: 53
Discharge: HOME | End: 2023-12-05
Attending: INTERNAL MEDICINE
Payer: COMMERCIAL

## 2023-12-05 DIAGNOSIS — Z78.0 MENOPAUSE: ICD-10-CM

## 2023-12-05 PROCEDURE — 77080 DXA BONE DENSITY AXIAL: CPT

## 2023-12-06 LAB
NCCN CRITERIA FLAG: ABNORMAL
TYRER CUZICK SCORE: 22.1

## 2023-12-08 NOTE — PROGRESS NOTES
This patient recently took the CARE risk assessment for a mammogram appointment. Based on the patient's responses, the Tyrer-Cuzick breast cancer risk score is > 20.  My attempts to discuss this with the patient have been unsuccessful.

## 2023-12-19 PROCEDURE — G0123 SCREEN CERV/VAG THIN LAYER: HCPCS | Performed by: OBSTETRICS & GYNECOLOGY

## 2023-12-19 PROCEDURE — 87624 HPV HI-RISK TYP POOLED RSLT: CPT | Performed by: OBSTETRICS & GYNECOLOGY

## 2023-12-20 ENCOUNTER — LAB REQUISITION (OUTPATIENT)
Dept: LAB | Facility: HOSPITAL | Age: 53
End: 2023-12-20
Payer: COMMERCIAL

## 2023-12-20 DIAGNOSIS — Z11.51 ENCOUNTER FOR SCREENING FOR HUMAN PAPILLOMAVIRUS (HPV): ICD-10-CM

## 2023-12-20 DIAGNOSIS — Z01.419 ENCOUNTER FOR GYNECOLOGICAL EXAMINATION (GENERAL) (ROUTINE) WITHOUT ABNORMAL FINDINGS: ICD-10-CM

## 2023-12-20 LAB
GEN CATEG CVX/VAG CYTO-IMP: NORMAL
HPV I/H RISK 4 DNA CVX QL PROBE+SIG AMP: NOT DETECTED
LAB AP CASE REPORT: NORMAL
LAB AP GYN ADDITIONAL INFORMATION: NORMAL
LAB AP GYN OTHER FINDINGS: NORMAL
Lab: NORMAL
PATH INTERP SPEC-IMP: NORMAL
STAT OF ADQ CVX/VAG CYTO-IMP: NORMAL

## 2023-12-28 ENCOUNTER — HOSPITAL ENCOUNTER (OUTPATIENT)
Dept: MAMMOGRAPHY | Facility: HOSPITAL | Age: 53
Discharge: HOME | End: 2023-12-28
Admitting: INTERNAL MEDICINE
Payer: COMMERCIAL

## 2023-12-28 DIAGNOSIS — Z12.31 ENCOUNTER FOR SCREENING MAMMOGRAM FOR MALIGNANT NEOPLASM OF BREAST: ICD-10-CM

## 2023-12-28 PROCEDURE — 77063 BREAST TOMOSYNTHESIS BI: CPT

## 2023-12-28 PROCEDURE — 77067 SCR MAMMO BI INCL CAD: CPT

## 2024-01-03 DIAGNOSIS — Z12.31 ENCOUNTER FOR SCREENING MAMMOGRAM FOR MALIGNANT NEOPLASM OF BREAST: ICD-10-CM

## 2024-01-12 RX ORDER — DESVENLAFAXINE 100 MG/1
TABLET, EXTENDED RELEASE ORAL
Qty: 90 TABLET | Refills: 1 | Status: SHIPPED | OUTPATIENT
Start: 2024-01-12

## 2024-01-31 RX ORDER — PANTOPRAZOLE SODIUM 40 MG/1
40 TABLET, DELAYED RELEASE ORAL DAILY
Qty: 30 TABLET | Refills: 0 | Status: SHIPPED | OUTPATIENT
Start: 2024-01-31

## 2024-01-31 NOTE — TELEPHONE ENCOUNTER
01- Called patient LVM for her to return a call the office as that she is needing an office apt Last seen 02-.  Pharmacy has requested a refill on her Medication.     Routed to SARITHA RODRIGUEZ

## 2024-01-31 NOTE — TELEPHONE ENCOUNTER
01- Called patient Spoke with She stated that she does not want to schedule a F/U Apt at this time.  Explained that a 30 day supply was sent in but if she decides to continue the medication then she will need an office apt.       Oked per patient

## 2024-02-08 ENCOUNTER — TRANSCRIBE ORDERS (OUTPATIENT)
Dept: ADMINISTRATIVE | Facility: HOSPITAL | Age: 54
End: 2024-02-08
Payer: OTHER MISCELLANEOUS

## 2024-02-08 DIAGNOSIS — S62.024D: Primary | ICD-10-CM

## 2024-02-14 ENCOUNTER — HOSPITAL ENCOUNTER (OUTPATIENT)
Dept: CT IMAGING | Facility: HOSPITAL | Age: 54
Discharge: HOME | End: 2024-02-14
Admitting: ORTHOPAEDIC SURGERY
Payer: OTHER MISCELLANEOUS

## 2024-02-14 DIAGNOSIS — S62.024D: ICD-10-CM

## 2024-02-14 PROCEDURE — 73200 CT UPPER EXTREMITY W/O DYE: CPT

## 2024-03-14 ENCOUNTER — TELEPHONE (OUTPATIENT)
Dept: INTERNAL MEDICINE | Age: 54
End: 2024-03-14

## 2024-03-14 NOTE — TELEPHONE ENCOUNTER
Spoke to primo and gave her the codes that are on the labs, also sent our Billing department her information to make sure that they put the Z00.00 code on these labs.

## 2024-03-14 NOTE — TELEPHONE ENCOUNTER
Sonia with insurance where patient works called about labs that was done on 11/21/23 stating that they was coded wrong they was coded as a non-covered procedure. They would like a call back to discuss the coding and billing. Call back number is [phone]

## 2024-04-30 DIAGNOSIS — Z78.0 MENOPAUSE: ICD-10-CM

## 2024-04-30 DIAGNOSIS — R31.29 MICROHEMATURIA: ICD-10-CM

## 2024-04-30 DIAGNOSIS — E78.2 MIXED HYPERLIPIDEMIA: ICD-10-CM

## 2024-04-30 DIAGNOSIS — I10 ESSENTIAL (PRIMARY) HYPERTENSION: ICD-10-CM

## 2024-04-30 DIAGNOSIS — Z12.31 ENCOUNTER FOR SCREENING MAMMOGRAM FOR MALIGNANT NEOPLASM OF BREAST: ICD-10-CM

## 2024-04-30 DIAGNOSIS — E66.09 EXOGENOUS OBESITY: ICD-10-CM

## 2024-04-30 DIAGNOSIS — R73.01 IFG (IMPAIRED FASTING GLUCOSE): ICD-10-CM

## 2024-04-30 DIAGNOSIS — F41.1 GENERALIZED ANXIETY DISORDER: ICD-10-CM

## 2024-04-30 LAB
ALBUMIN SERPL-MCNC: 3.9 G/DL (ref 3.5–5.2)
ALP SERPL-CCNC: 92 U/L (ref 35–104)
ALT SERPL-CCNC: 20 U/L (ref 5–33)
ANION GAP SERPL CALCULATED.3IONS-SCNC: 11 MMOL/L (ref 7–19)
AST SERPL-CCNC: 21 U/L (ref 5–32)
BACTERIA URNS QL MICRO: NEGATIVE /HPF
BILIRUB SERPL-MCNC: 0.3 MG/DL (ref 0.2–1.2)
BILIRUB UR QL STRIP: NEGATIVE
BUN SERPL-MCNC: 12 MG/DL (ref 6–20)
CALCIUM SERPL-MCNC: 9.1 MG/DL (ref 8.6–10)
CHLORIDE SERPL-SCNC: 105 MMOL/L (ref 98–111)
CHOLEST SERPL-MCNC: 162 MG/DL (ref 160–199)
CLARITY UR: CLEAR
CO2 SERPL-SCNC: 26 MMOL/L (ref 22–29)
COLOR UR: YELLOW
CREAT SERPL-MCNC: 0.5 MG/DL (ref 0.5–0.9)
CRYSTALS URNS MICRO: ABNORMAL /HPF
EPI CELLS #/AREA URNS AUTO: 2 /HPF (ref 0–5)
GLUCOSE SERPL-MCNC: 106 MG/DL (ref 74–109)
GLUCOSE UR STRIP.AUTO-MCNC: NEGATIVE MG/DL
HBA1C MFR BLD: 5.9 % (ref 4–6)
HDLC SERPL-MCNC: 53 MG/DL (ref 65–121)
HGB UR STRIP.AUTO-MCNC: NEGATIVE MG/L
HYALINE CASTS #/AREA URNS AUTO: 3 /HPF (ref 0–8)
KETONES UR STRIP.AUTO-MCNC: NEGATIVE MG/DL
LDLC SERPL CALC-MCNC: 89 MG/DL
LEUKOCYTE ESTERASE UR QL STRIP.AUTO: ABNORMAL
NITRITE UR QL STRIP.AUTO: NEGATIVE
PH UR STRIP.AUTO: 6 [PH] (ref 5–8)
POTASSIUM SERPL-SCNC: 3.9 MMOL/L (ref 3.5–5)
PROT SERPL-MCNC: 6.9 G/DL (ref 6.6–8.7)
PROT UR STRIP.AUTO-MCNC: NEGATIVE MG/DL
RBC #/AREA URNS AUTO: 2 /HPF (ref 0–4)
SODIUM SERPL-SCNC: 142 MMOL/L (ref 136–145)
SP GR UR STRIP.AUTO: 1.02 (ref 1–1.03)
TRIGL SERPL-MCNC: 99 MG/DL (ref 0–149)
UROBILINOGEN UR STRIP.AUTO-MCNC: 1 E.U./DL
WBC #/AREA URNS AUTO: 2 /HPF (ref 0–5)

## 2024-05-04 LAB
GAMMA INTERFERON BACKGROUND BLD IA-ACNC: 0.02 IU/ML
MITOGEN IGNF BCKGRD COR BLD-ACNC: 9.82 IU/ML
QUANTI TB GOLD PLUS: NEGATIVE
QUANTI TB1 MINUS NIL: 0 IU/ML (ref 0–0.34)
QUANTI TB2 MINUS NIL: 0 IU/ML (ref 0–0.34)

## 2024-05-28 SDOH — ECONOMIC STABILITY: INCOME INSECURITY: HOW HARD IS IT FOR YOU TO PAY FOR THE VERY BASICS LIKE FOOD, HOUSING, MEDICAL CARE, AND HEATING?: SOMEWHAT HARD

## 2024-05-28 SDOH — ECONOMIC STABILITY: FOOD INSECURITY: WITHIN THE PAST 12 MONTHS, YOU WORRIED THAT YOUR FOOD WOULD RUN OUT BEFORE YOU GOT MONEY TO BUY MORE.: NEVER TRUE

## 2024-05-28 SDOH — ECONOMIC STABILITY: FOOD INSECURITY: WITHIN THE PAST 12 MONTHS, THE FOOD YOU BOUGHT JUST DIDN'T LAST AND YOU DIDN'T HAVE MONEY TO GET MORE.: NEVER TRUE

## 2024-05-28 ASSESSMENT — PATIENT HEALTH QUESTIONNAIRE - PHQ9
SUM OF ALL RESPONSES TO PHQ QUESTIONS 1-9: 3
4. FEELING TIRED OR HAVING LITTLE ENERGY: SEVERAL DAYS
3. TROUBLE FALLING OR STAYING ASLEEP: SEVERAL DAYS
5. POOR APPETITE OR OVEREATING: SEVERAL DAYS
SUM OF ALL RESPONSES TO PHQ9 QUESTIONS 1 & 2: 0
8. MOVING OR SPEAKING SO SLOWLY THAT OTHER PEOPLE COULD HAVE NOTICED. OR THE OPPOSITE - BEING SO FIDGETY OR RESTLESS THAT YOU HAVE BEEN MOVING AROUND A LOT MORE THAN USUAL: NOT AT ALL
SUM OF ALL RESPONSES TO PHQ QUESTIONS 1-9: 3
2. FEELING DOWN, DEPRESSED OR HOPELESS: NOT AT ALL
1. LITTLE INTEREST OR PLEASURE IN DOING THINGS: NOT AT ALL
4. FEELING TIRED OR HAVING LITTLE ENERGY: SEVERAL DAYS
8. MOVING OR SPEAKING SO SLOWLY THAT OTHER PEOPLE COULD HAVE NOTICED. OR THE OPPOSITE, BEING SO FIGETY OR RESTLESS THAT YOU HAVE BEEN MOVING AROUND A LOT MORE THAN USUAL: NOT AT ALL
1. LITTLE INTEREST OR PLEASURE IN DOING THINGS: NOT AT ALL
SUM OF ALL RESPONSES TO PHQ QUESTIONS 1-9: 3
10. IF YOU CHECKED OFF ANY PROBLEMS, HOW DIFFICULT HAVE THESE PROBLEMS MADE IT FOR YOU TO DO YOUR WORK, TAKE CARE OF THINGS AT HOME, OR GET ALONG WITH OTHER PEOPLE: NOT DIFFICULT AT ALL
9. THOUGHTS THAT YOU WOULD BE BETTER OFF DEAD, OR OF HURTING YOURSELF: NOT AT ALL
2. FEELING DOWN, DEPRESSED OR HOPELESS: NOT AT ALL
7. TROUBLE CONCENTRATING ON THINGS, SUCH AS READING THE NEWSPAPER OR WATCHING TELEVISION: NOT AT ALL
6. FEELING BAD ABOUT YOURSELF - OR THAT YOU ARE A FAILURE OR HAVE LET YOURSELF OR YOUR FAMILY DOWN: NOT AT ALL
3. TROUBLE FALLING OR STAYING ASLEEP: SEVERAL DAYS
6. FEELING BAD ABOUT YOURSELF - OR THAT YOU ARE A FAILURE OR HAVE LET YOURSELF OR YOUR FAMILY DOWN: NOT AT ALL
9. THOUGHTS THAT YOU WOULD BE BETTER OFF DEAD, OR OF HURTING YOURSELF: NOT AT ALL
10. IF YOU CHECKED OFF ANY PROBLEMS, HOW DIFFICULT HAVE THESE PROBLEMS MADE IT FOR YOU TO DO YOUR WORK, TAKE CARE OF THINGS AT HOME, OR GET ALONG WITH OTHER PEOPLE: NOT DIFFICULT AT ALL
7. TROUBLE CONCENTRATING ON THINGS, SUCH AS READING THE NEWSPAPER OR WATCHING TELEVISION: NOT AT ALL
SUM OF ALL RESPONSES TO PHQ QUESTIONS 1-9: 3
SUM OF ALL RESPONSES TO PHQ QUESTIONS 1-9: 3
5. POOR APPETITE OR OVEREATING: SEVERAL DAYS

## 2024-05-29 ENCOUNTER — OFFICE VISIT (OUTPATIENT)
Dept: INTERNAL MEDICINE | Age: 54
End: 2024-05-29
Payer: COMMERCIAL

## 2024-05-29 VITALS
DIASTOLIC BLOOD PRESSURE: 78 MMHG | SYSTOLIC BLOOD PRESSURE: 120 MMHG | OXYGEN SATURATION: 97 % | BODY MASS INDEX: 32.8 KG/M2 | TEMPERATURE: 98 F | HEART RATE: 100 BPM | HEIGHT: 68 IN | WEIGHT: 216.4 LBS

## 2024-05-29 DIAGNOSIS — J30.89 SEASONAL ALLERGIC RHINITIS DUE TO OTHER ALLERGIC TRIGGER: ICD-10-CM

## 2024-05-29 DIAGNOSIS — F41.1 GENERALIZED ANXIETY DISORDER: ICD-10-CM

## 2024-05-29 DIAGNOSIS — R09.81 SINUS CONGESTION: ICD-10-CM

## 2024-05-29 DIAGNOSIS — M43.16 SPONDYLOLISTHESIS OF LUMBAR REGION: ICD-10-CM

## 2024-05-29 DIAGNOSIS — R73.01 IFG (IMPAIRED FASTING GLUCOSE): ICD-10-CM

## 2024-05-29 DIAGNOSIS — E78.2 MIXED HYPERLIPIDEMIA: ICD-10-CM

## 2024-05-29 DIAGNOSIS — E66.09 EXOGENOUS OBESITY: ICD-10-CM

## 2024-05-29 DIAGNOSIS — I10 ESSENTIAL (PRIMARY) HYPERTENSION: Primary | ICD-10-CM

## 2024-05-29 PROCEDURE — 3074F SYST BP LT 130 MM HG: CPT | Performed by: INTERNAL MEDICINE

## 2024-05-29 PROCEDURE — 99214 OFFICE O/P EST MOD 30 MIN: CPT | Performed by: INTERNAL MEDICINE

## 2024-05-29 PROCEDURE — 3078F DIAST BP <80 MM HG: CPT | Performed by: INTERNAL MEDICINE

## 2024-05-29 RX ORDER — PREDNISONE 10 MG/1
10 TABLET ORAL 2 TIMES DAILY
Qty: 8 TABLET | Refills: 0 | Status: SHIPPED | OUTPATIENT
Start: 2024-05-29 | End: 2024-06-02

## 2024-05-29 RX ORDER — ALBUTEROL SULFATE 90 UG/1
2 AEROSOL, METERED RESPIRATORY (INHALATION) EVERY 6 HOURS PRN
Qty: 1 EACH | Refills: 3 | Status: SHIPPED | OUTPATIENT
Start: 2024-05-29

## 2024-05-29 SDOH — ECONOMIC STABILITY: FOOD INSECURITY: WITHIN THE PAST 12 MONTHS, YOU WORRIED THAT YOUR FOOD WOULD RUN OUT BEFORE YOU GOT MONEY TO BUY MORE.: NEVER TRUE

## 2024-05-29 SDOH — ECONOMIC STABILITY: FOOD INSECURITY: WITHIN THE PAST 12 MONTHS, THE FOOD YOU BOUGHT JUST DIDN'T LAST AND YOU DIDN'T HAVE MONEY TO GET MORE.: NEVER TRUE

## 2024-05-29 SDOH — ECONOMIC STABILITY: INCOME INSECURITY: HOW HARD IS IT FOR YOU TO PAY FOR THE VERY BASICS LIKE FOOD, HOUSING, MEDICAL CARE, AND HEATING?: NOT HARD AT ALL

## 2024-05-29 ASSESSMENT — ENCOUNTER SYMPTOMS
SINUS PAIN: 1
SINUS PRESSURE: 1
SORE THROAT: 0
ABDOMINAL PAIN: 0
CONSTIPATION: 0
BACK PAIN: 1
COUGH: 0
WHEEZING: 0
CHEST TIGHTNESS: 0

## 2024-05-29 NOTE — PROGRESS NOTES
adequate Repeat 22    COLONOSCOPY N/A 2022    Dr Rainey, sm erosions in areas of biopsies done from day previous, sm ulcerations wo bleeding at site of polypectomy, tortuous redundant colon, int hem Gr 1 likely recent blood from rectum but wo at time of exam, 5 year recall    ENDOSCOPY, COLON, DIAGNOSTIC      KNEE ARTHROSCOPY Bilateral     [place] COLONOSCOPY FLX DX W/COLLJ SPEC WHEN PFRMD N/A 2016    Dr Lawson-[facility]    SINUS SURGERY      nasal septoplasty/ maxillary antrostomies    TONSILLECTOMY      UPPER GASTROINTESTINAL ENDOSCOPY      Dr. [place], [place]    UPPER GASTROINTESTINAL ENDOSCOPY N/A 2022    Dr Rainey,2-3 cm sm h[facility]. (-)[place]     Current Outpatient Medications   Medication Sig Dispense Refill    albuterol sulfate HFA (VENTOLIN HFA) 108 (90 Base) MCG/ACT inhaler Inhale 2 puffs into the lungs every 6 hours as needed for Wheezing 1 each 3    predniSONE (DELTASONE) 10 MG tablet Take 1 tablet by mouth 2 times daily for 4 days 8 tablet 0    desvenlafaxine succinate (PRISTIQ) 100 MG TB24 extended release tablet TAKE 1 TABLET BY MOUTH ONCE DAILY 90 tablet 1    Secukinumab (COSENTYX, 300 MG DOSE, SC) Inject into the skin      cetirizine (ZYRTEC) 10 MG tablet Take 1 tablet by mouth daily      fluticasone (FLONASE) 50 MCG/ACT nasal spray 2 sprays by Each Nostril route daily 1 Bottle 0     No current facility-administered medications for this visit.     No Known Allergies  Social History     Tobacco Use    Smoking status: Former     Current packs/day: 0.00     Average packs/day: 1.5 packs/day for 10.0 years (15.0 ttl pk-yrs)     Types: Cigarettes     Start date:      Quit date:      Years since quittin.4    Smokeless tobacco: Never   Substance Use Topics    Alcohol use: Yes     Comment: occ      Family History   Problem Relation Age of Onset    Colon Cancer Maternal Grandmother 60       Review of Systems   Constitutional:  Positive for fatigue. Negative for chills and

## 2024-06-26 ENCOUNTER — HOSPITAL ENCOUNTER (OUTPATIENT)
Dept: GENERAL RADIOLOGY | Age: 54
Discharge: HOME | End: 2024-06-26
Payer: COMMERCIAL

## 2024-06-26 ENCOUNTER — PATIENT MESSAGE (OUTPATIENT)
Dept: INTERNAL MEDICINE | Age: 54
End: 2024-06-26

## 2024-06-26 DIAGNOSIS — M43.16 SPONDYLOLISTHESIS OF LUMBAR REGION: ICD-10-CM

## 2024-06-26 PROCEDURE — 72100 X-RAY EXAM L-S SPINE 2/3 VWS: CPT

## 2024-06-26 NOTE — TELEPHONE ENCOUNTER
From: Rosalba Tirado  To: Dr. Sofia Donaldson  Sent: 6/26/2024 3:44 PM CDT  Subject: Xray of back    Do I need an appointment for my back xray?   Thank you   Rosalba

## 2024-07-03 ENCOUNTER — PATIENT MESSAGE (OUTPATIENT)
Dept: INTERNAL MEDICINE | Age: 54
End: 2024-07-03

## 2024-07-03 DIAGNOSIS — E66.09 EXOGENOUS OBESITY: Primary | ICD-10-CM

## 2024-07-03 RX ORDER — PHENTERMINE HYDROCHLORIDE 30 MG/1
30 CAPSULE ORAL EVERY MORNING
Qty: 30 CAPSULE | Refills: 1 | Status: SHIPPED | OUTPATIENT
Start: 2024-07-03 | End: 2024-09-01

## 2024-07-03 RX ORDER — DESVENLAFAXINE 100 MG/1
100 TABLET, EXTENDED RELEASE ORAL DAILY
Qty: 90 TABLET | Refills: 1 | Status: SHIPPED | OUTPATIENT
Start: 2024-07-03

## 2024-07-03 NOTE — TELEPHONE ENCOUNTER
From: Rosalba Tirado  To: Dr. Sofia Donaldson  Sent: 7/3/2024 10:51 AM CDT  Subject: Phentermine     I would like to go back on this if I can. My insurance won’t pay for any of the shots for me to try.   Thank you   Rosalba

## 2024-07-03 NOTE — TELEPHONE ENCOUNTER
Rosalba G Brandon called to request a refill on her medication.      Last office visit : 5/29/2024   Next office visit : 11/27/2024     Requested Prescriptions     Pending Prescriptions Disp Refills    desvenlafaxine succinate (PRISTIQ) 100 MG TB24 extended release tablet 90 tablet 1     Sig: Take 1 tablet by mouth daily            Beata lFannery MA

## 2024-07-11 DIAGNOSIS — B00.1 FEVER BLISTER: Primary | ICD-10-CM

## 2024-07-11 RX ORDER — VALACYCLOVIR HYDROCHLORIDE 1 G/1
1000 TABLET, FILM COATED ORAL 2 TIMES DAILY
Qty: 8 TABLET | Refills: 0 | Status: SHIPPED | OUTPATIENT
Start: 2024-07-11

## 2024-07-12 ENCOUNTER — OFFICE VISIT (OUTPATIENT)
Dept: INTERNAL MEDICINE | Age: 54
End: 2024-07-12
Payer: COMMERCIAL

## 2024-07-12 VITALS
SYSTOLIC BLOOD PRESSURE: 120 MMHG | HEIGHT: 68 IN | DIASTOLIC BLOOD PRESSURE: 70 MMHG | HEART RATE: 80 BPM | WEIGHT: 216 LBS | BODY MASS INDEX: 32.74 KG/M2 | OXYGEN SATURATION: 98 %

## 2024-07-12 DIAGNOSIS — B02.9 HERPES ZOSTER WITHOUT COMPLICATION: Primary | ICD-10-CM

## 2024-07-12 DIAGNOSIS — R68.89: ICD-10-CM

## 2024-07-12 DIAGNOSIS — R21 FACIAL RASH: ICD-10-CM

## 2024-07-12 PROCEDURE — 99213 OFFICE O/P EST LOW 20 MIN: CPT | Performed by: INTERNAL MEDICINE

## 2024-07-12 PROCEDURE — 3074F SYST BP LT 130 MM HG: CPT | Performed by: INTERNAL MEDICINE

## 2024-07-12 PROCEDURE — 3078F DIAST BP <80 MM HG: CPT | Performed by: INTERNAL MEDICINE

## 2024-07-12 RX ORDER — VALACYCLOVIR HYDROCHLORIDE 1 G/1
1000 TABLET, FILM COATED ORAL 3 TIMES DAILY
Qty: 21 TABLET | Refills: 0 | Status: SHIPPED | OUTPATIENT
Start: 2024-07-12 | End: 2024-07-19

## 2024-07-12 RX ORDER — PREDNISONE 10 MG/1
10 TABLET ORAL DAILY
Qty: 4 TABLET | Refills: 0 | Status: SHIPPED | OUTPATIENT
Start: 2024-07-12 | End: 2024-07-16

## 2024-07-12 ASSESSMENT — ENCOUNTER SYMPTOMS
CHEST TIGHTNESS: 0
SORE THROAT: 0
ABDOMINAL PAIN: 0
COUGH: 0
WHEEZING: 0
CONSTIPATION: 0

## 2024-07-12 NOTE — PROGRESS NOTES
phrases may be inadvertently transcribed. Although I have reviewed the note for sucherrors, some may still exist.

## 2024-08-27 ENCOUNTER — OFFICE VISIT (OUTPATIENT)
Dept: INTERNAL MEDICINE | Age: 54
End: 2024-08-27
Payer: COMMERCIAL

## 2024-08-27 VITALS
HEART RATE: 85 BPM | DIASTOLIC BLOOD PRESSURE: 78 MMHG | HEIGHT: 68 IN | SYSTOLIC BLOOD PRESSURE: 120 MMHG | BODY MASS INDEX: 31.64 KG/M2 | WEIGHT: 208.8 LBS | OXYGEN SATURATION: 98 %

## 2024-08-27 DIAGNOSIS — J34.89 NASAL SORE: ICD-10-CM

## 2024-08-27 DIAGNOSIS — E66.09 EXOGENOUS OBESITY: Primary | ICD-10-CM

## 2024-08-27 DIAGNOSIS — I10 ESSENTIAL (PRIMARY) HYPERTENSION: ICD-10-CM

## 2024-08-27 PROCEDURE — 3078F DIAST BP <80 MM HG: CPT | Performed by: INTERNAL MEDICINE

## 2024-08-27 PROCEDURE — 99213 OFFICE O/P EST LOW 20 MIN: CPT | Performed by: INTERNAL MEDICINE

## 2024-08-27 PROCEDURE — 3074F SYST BP LT 130 MM HG: CPT | Performed by: INTERNAL MEDICINE

## 2024-08-27 RX ORDER — PHENTERMINE HYDROCHLORIDE 30 MG/1
30 CAPSULE ORAL EVERY MORNING
Qty: 30 CAPSULE | Refills: 1 | Status: SHIPPED | OUTPATIENT
Start: 2024-08-27 | End: 2024-10-26

## 2024-08-27 RX ORDER — MUPIROCIN 20 MG/G
OINTMENT TOPICAL
Qty: 22 G | Refills: 1 | Status: SHIPPED | OUTPATIENT
Start: 2024-08-27 | End: 2024-09-03

## 2024-08-27 ASSESSMENT — ENCOUNTER SYMPTOMS
WHEEZING: 0
SORE THROAT: 0
ABDOMINAL PAIN: 0
COUGH: 0
CONSTIPATION: 0
CHEST TIGHTNESS: 0

## 2024-08-27 NOTE — PROGRESS NOTES
erosions in areas of biopsies done from day previous, sm ulcerations wo bleeding at site of polypectomy, tortuous redundant colon, int hem Gr 1 likely recent blood from rectum but wo at time of exam, 5 year recall    ENDOSCOPY, COLON, DIAGNOSTIC      KNEE ARTHROSCOPY Bilateral     [place] COLONOSCOPY FLX DX W/COLLJ SPEC WHEN PFRMD N/A 2016    Dr Lawson-[facility]    SINUS SURGERY      nasal septoplasty/ maxillary antrostomies    TONSILLECTOMY      UPPER GASTROINTESTINAL ENDOSCOPY      Dr. [place], [place]    UPPER GASTROINTESTINAL ENDOSCOPY N/A 2022    Dr Rainey,2-3 cm sm hh. (-)[place]     Current Outpatient Medications   Medication Sig Dispense Refill    mupirocin (BACTROBAN) 2 % ointment Apply topically 3 times daily. 22 g 1    phentermine 30 MG capsule Take 1 capsule by mouth every morning for 60 days. Max Daily Amount: 30 mg 30 capsule 1    valACYclovir (VALTREX) 1 g tablet Take 1 tablet by mouth in the morning and at bedtime Take one tablet twice daily at on set of first fever blister and second day one tablet twice daily. 8 tablet 0    desvenlafaxine succinate (PRISTIQ) 100 MG TB24 extended release tablet Take 1 tablet by mouth daily 90 tablet 1    albuterol sulfate HFA (VENTOLIN HFA) 108 (90 Base) MCG/ACT inhaler Inhale 2 puffs into the lungs every 6 hours as needed for Wheezing 1 each 3    Secukinumab (COSENTYX, 300 MG DOSE, SC) Inject into the skin      cetirizine (ZYRTEC) 10 MG tablet Take 1 tablet by mouth daily      fluticasone (FLONASE) 50 MCG/ACT nasal spray 2 sprays by Each Nostril route daily 1 Bottle 0     No current facility-administered medications for this visit.     No Known Allergies  Social History     Tobacco Use    Smoking status: Former     Current packs/day: 0.00     Average packs/day: 1.5 packs/day for 10.0 years (15.0 ttl pk-yrs)     Types: Cigarettes     Start date:      Quit date:      Years since quittin.6    Smokeless tobacco: Never   Substance Use Topics     sucherrors, some may still exist.

## 2024-09-06 DIAGNOSIS — E66.09 EXOGENOUS OBESITY: ICD-10-CM

## 2024-09-06 RX ORDER — PHENTERMINE HYDROCHLORIDE 30 MG/1
30 CAPSULE ORAL EVERY MORNING
Qty: 30 CAPSULE | Refills: 0 | Status: SHIPPED | OUTPATIENT
Start: 2024-09-06 | End: 2024-11-05

## 2024-09-06 NOTE — TELEPHONE ENCOUNTER
Rosalba G Brandon called to request a refill on her medication.      Last office visit : 8/27/2024   Next office visit : 11/27/2024     Requested Prescriptions     Pending Prescriptions Disp Refills    phentermine 30 MG capsule 30 capsule 1     Sig: Take 1 capsule by mouth every morning for 60 days. Max Daily Amount: 30 mg            Beata Flannery MA

## 2024-11-22 DIAGNOSIS — M43.16 SPONDYLOLISTHESIS OF LUMBAR REGION: ICD-10-CM

## 2024-11-22 DIAGNOSIS — E78.2 MIXED HYPERLIPIDEMIA: ICD-10-CM

## 2024-11-22 DIAGNOSIS — E66.09 EXOGENOUS OBESITY: ICD-10-CM

## 2024-11-22 DIAGNOSIS — F41.1 GENERALIZED ANXIETY DISORDER: ICD-10-CM

## 2024-11-22 DIAGNOSIS — R09.81 SINUS CONGESTION: ICD-10-CM

## 2024-11-22 DIAGNOSIS — I10 ESSENTIAL (PRIMARY) HYPERTENSION: ICD-10-CM

## 2024-11-22 DIAGNOSIS — J30.89 SEASONAL ALLERGIC RHINITIS DUE TO OTHER ALLERGIC TRIGGER: ICD-10-CM

## 2024-11-22 DIAGNOSIS — R73.01 IFG (IMPAIRED FASTING GLUCOSE): ICD-10-CM

## 2024-11-22 LAB
ALBUMIN SERPL-MCNC: 4 G/DL (ref 3.5–5.2)
ALP SERPL-CCNC: 93 U/L (ref 35–104)
ALT SERPL-CCNC: 23 U/L (ref 5–33)
ANION GAP SERPL CALCULATED.3IONS-SCNC: 11 MMOL/L (ref 7–19)
AST SERPL-CCNC: 24 U/L (ref 5–32)
BACTERIA URNS QL MICRO: NEGATIVE /HPF
BASOPHILS # BLD: 0.1 K/UL (ref 0–0.2)
BASOPHILS NFR BLD: 0.7 % (ref 0–1)
BILIRUB SERPL-MCNC: 0.5 MG/DL (ref 0.2–1.2)
BILIRUB UR QL STRIP: NEGATIVE
BUN SERPL-MCNC: 13 MG/DL (ref 6–20)
CALCIUM SERPL-MCNC: 9.4 MG/DL (ref 8.6–10)
CHLORIDE SERPL-SCNC: 102 MMOL/L (ref 98–111)
CHOLEST SERPL-MCNC: 184 MG/DL (ref 0–199)
CLARITY UR: CLEAR
CO2 SERPL-SCNC: 29 MMOL/L (ref 22–29)
COLOR UR: ABNORMAL
CREAT SERPL-MCNC: 0.5 MG/DL (ref 0.5–0.9)
CRYSTALS URNS MICRO: NORMAL /HPF
EOSINOPHIL # BLD: 0.2 K/UL (ref 0–0.6)
EOSINOPHIL NFR BLD: 2.4 % (ref 0–5)
EPI CELLS #/AREA URNS AUTO: 3 /HPF (ref 0–5)
ERYTHROCYTE [DISTWIDTH] IN BLOOD BY AUTOMATED COUNT: 13.5 % (ref 11.5–14.5)
GLUCOSE SERPL-MCNC: 107 MG/DL (ref 70–99)
GLUCOSE UR STRIP.AUTO-MCNC: NEGATIVE MG/DL
HCT VFR BLD AUTO: 46 % (ref 37–47)
HDLC SERPL-MCNC: 61 MG/DL (ref 40–60)
HGB BLD-MCNC: 14.1 G/DL (ref 12–16)
HGB UR STRIP.AUTO-MCNC: NEGATIVE MG/L
HYALINE CASTS #/AREA URNS AUTO: 4 /HPF (ref 0–8)
IMM GRANULOCYTES # BLD: 0 K/UL
KETONES UR STRIP.AUTO-MCNC: NEGATIVE MG/DL
LDLC SERPL CALC-MCNC: 104 MG/DL
LEUKOCYTE ESTERASE UR QL STRIP.AUTO: ABNORMAL
LYMPHOCYTES # BLD: 2 K/UL (ref 1.1–4.5)
LYMPHOCYTES NFR BLD: 28.6 % (ref 20–40)
MCH RBC QN AUTO: 29.4 PG (ref 27–31)
MCHC RBC AUTO-ENTMCNC: 30.7 G/DL (ref 33–37)
MCV RBC AUTO: 95.8 FL (ref 81–99)
MONOCYTES # BLD: 0.7 K/UL (ref 0–0.9)
MONOCYTES NFR BLD: 9.9 % (ref 0–10)
NEUTROPHILS # BLD: 4.1 K/UL (ref 1.5–7.5)
NEUTS SEG NFR BLD: 58.1 % (ref 50–65)
NITRITE UR QL STRIP.AUTO: NEGATIVE
PH UR STRIP.AUTO: 7 [PH] (ref 5–8)
PLATELET # BLD AUTO: 305 K/UL (ref 130–400)
PMV BLD AUTO: 11.5 FL (ref 9.4–12.3)
POTASSIUM SERPL-SCNC: 4 MMOL/L (ref 3.5–5)
PROT SERPL-MCNC: 7.2 G/DL (ref 6.4–8.3)
PROT UR STRIP.AUTO-MCNC: ABNORMAL MG/DL
RBC # BLD AUTO: 4.8 M/UL (ref 4.2–5.4)
RBC #/AREA URNS AUTO: 3 /HPF (ref 0–4)
SODIUM SERPL-SCNC: 142 MMOL/L (ref 136–145)
SP GR UR STRIP.AUTO: 1.02 (ref 1–1.03)
TRIGL SERPL-MCNC: 95 MG/DL (ref 0–149)
TSH SERPL DL<=0.005 MIU/L-ACNC: 1.88 UIU/ML (ref 0.27–4.2)
UROBILINOGEN UR STRIP.AUTO-MCNC: 1 E.U./DL
WBC # BLD AUTO: 7.1 K/UL (ref 4.8–10.8)
WBC #/AREA URNS AUTO: 2 /HPF (ref 0–5)

## 2024-11-27 ENCOUNTER — TRANSCRIBE ORDERS (OUTPATIENT)
Dept: ADMINISTRATIVE | Facility: HOSPITAL | Age: 54
End: 2024-11-27
Payer: OTHER MISCELLANEOUS

## 2024-11-27 ENCOUNTER — OFFICE VISIT (OUTPATIENT)
Dept: INTERNAL MEDICINE | Age: 54
End: 2024-11-27
Payer: COMMERCIAL

## 2024-11-27 VITALS
SYSTOLIC BLOOD PRESSURE: 130 MMHG | WEIGHT: 206 LBS | OXYGEN SATURATION: 98 % | HEART RATE: 88 BPM | DIASTOLIC BLOOD PRESSURE: 78 MMHG | HEIGHT: 68 IN | BODY MASS INDEX: 31.22 KG/M2

## 2024-11-27 DIAGNOSIS — Z00.00 ANNUAL PHYSICAL EXAM: Primary | ICD-10-CM

## 2024-11-27 DIAGNOSIS — Z12.31 ENCOUNTER FOR SCREENING MAMMOGRAM FOR MALIGNANT NEOPLASM OF BREAST: Primary | ICD-10-CM

## 2024-11-27 DIAGNOSIS — I10 ESSENTIAL (PRIMARY) HYPERTENSION: ICD-10-CM

## 2024-11-27 DIAGNOSIS — F41.1 GENERALIZED ANXIETY DISORDER: ICD-10-CM

## 2024-11-27 DIAGNOSIS — Z12.31 ENCOUNTER FOR SCREENING MAMMOGRAM FOR MALIGNANT NEOPLASM OF BREAST: ICD-10-CM

## 2024-11-27 DIAGNOSIS — E78.2 MIXED HYPERLIPIDEMIA: ICD-10-CM

## 2024-11-27 DIAGNOSIS — Z23 NEED FOR VACCINATION: ICD-10-CM

## 2024-11-27 DIAGNOSIS — D84.9 IMMUNOSUPPRESSED STATUS (HCC): ICD-10-CM

## 2024-11-27 DIAGNOSIS — Z12.4 SCREENING FOR CERVICAL CANCER: ICD-10-CM

## 2024-11-27 DIAGNOSIS — E66.09 EXOGENOUS OBESITY: ICD-10-CM

## 2024-11-27 PROCEDURE — 3075F SYST BP GE 130 - 139MM HG: CPT | Performed by: INTERNAL MEDICINE

## 2024-11-27 PROCEDURE — 99396 PREV VISIT EST AGE 40-64: CPT | Performed by: INTERNAL MEDICINE

## 2024-11-27 PROCEDURE — 90471 IMMUNIZATION ADMIN: CPT | Performed by: INTERNAL MEDICINE

## 2024-11-27 PROCEDURE — 3078F DIAST BP <80 MM HG: CPT | Performed by: INTERNAL MEDICINE

## 2024-11-27 PROCEDURE — 90653 IIV ADJUVANT VACCINE IM: CPT | Performed by: INTERNAL MEDICINE

## 2024-11-27 RX ORDER — VALACYCLOVIR HYDROCHLORIDE 1 G/1
1000 TABLET, FILM COATED ORAL 2 TIMES DAILY
Qty: 8 TABLET | Refills: 0 | Status: SHIPPED | OUTPATIENT
Start: 2024-11-27

## 2024-11-27 RX ORDER — PHENTERMINE HYDROCHLORIDE 30 MG/1
30 CAPSULE ORAL EVERY MORNING
Qty: 30 CAPSULE | Refills: 1 | Status: SHIPPED | OUTPATIENT
Start: 2024-11-27 | End: 2025-01-26

## 2024-11-27 SDOH — ECONOMIC STABILITY: FOOD INSECURITY: WITHIN THE PAST 12 MONTHS, THE FOOD YOU BOUGHT JUST DIDN'T LAST AND YOU DIDN'T HAVE MONEY TO GET MORE.: NEVER TRUE

## 2024-11-27 SDOH — ECONOMIC STABILITY: INCOME INSECURITY: HOW HARD IS IT FOR YOU TO PAY FOR THE VERY BASICS LIKE FOOD, HOUSING, MEDICAL CARE, AND HEATING?: NOT HARD AT ALL

## 2024-11-27 SDOH — ECONOMIC STABILITY: FOOD INSECURITY: WITHIN THE PAST 12 MONTHS, YOU WORRIED THAT YOUR FOOD WOULD RUN OUT BEFORE YOU GOT MONEY TO BUY MORE.: NEVER TRUE

## 2024-11-27 ASSESSMENT — PATIENT HEALTH QUESTIONNAIRE - PHQ9
SUM OF ALL RESPONSES TO PHQ QUESTIONS 1-9: 4
8. MOVING OR SPEAKING SO SLOWLY THAT OTHER PEOPLE COULD HAVE NOTICED. OR THE OPPOSITE, BEING SO FIGETY OR RESTLESS THAT YOU HAVE BEEN MOVING AROUND A LOT MORE THAN USUAL: NOT AT ALL
4. FEELING TIRED OR HAVING LITTLE ENERGY: MORE THAN HALF THE DAYS
10. IF YOU CHECKED OFF ANY PROBLEMS, HOW DIFFICULT HAVE THESE PROBLEMS MADE IT FOR YOU TO DO YOUR WORK, TAKE CARE OF THINGS AT HOME, OR GET ALONG WITH OTHER PEOPLE: SOMEWHAT DIFFICULT
9. THOUGHTS THAT YOU WOULD BE BETTER OFF DEAD, OR OF HURTING YOURSELF: NOT AT ALL
SUM OF ALL RESPONSES TO PHQ QUESTIONS 1-9: 4
1. LITTLE INTEREST OR PLEASURE IN DOING THINGS: NOT AT ALL
SUM OF ALL RESPONSES TO PHQ9 QUESTIONS 1 & 2: 0
6. FEELING BAD ABOUT YOURSELF - OR THAT YOU ARE A FAILURE OR HAVE LET YOURSELF OR YOUR FAMILY DOWN: NOT AT ALL
7. TROUBLE CONCENTRATING ON THINGS, SUCH AS READING THE NEWSPAPER OR WATCHING TELEVISION: NOT AT ALL
5. POOR APPETITE OR OVEREATING: NOT AT ALL
2. FEELING DOWN, DEPRESSED OR HOPELESS: NOT AT ALL
SUM OF ALL RESPONSES TO PHQ QUESTIONS 1-9: 4
SUM OF ALL RESPONSES TO PHQ QUESTIONS 1-9: 4
3. TROUBLE FALLING OR STAYING ASLEEP: MORE THAN HALF THE DAYS

## 2024-11-27 ASSESSMENT — ENCOUNTER SYMPTOMS
ABDOMINAL PAIN: 0
SORE THROAT: 0
CONSTIPATION: 0
WHEEZING: 0
COUGH: 0
CHEST TIGHTNESS: 0

## 2024-11-27 NOTE — PROGRESS NOTES
Lymphadenopathy:      Cervical: No cervical adenopathy.   Skin:     Findings: No rash.   Neurological:      Mental Status: She is oriented to person, place, and time.           ASSESSMENT/PLAN  ANNUAL PHYSICAL  * Pap and pelvic per GYN-needs new md  Referral placed  * Visit for screening mammogram- schedule  * Colonoscopy 12/22/ repeat 5 yrs-2027  * Bone density 2023 nl      Need for vaccination  -     Influenza, FLUAD Trivalent, (age 65 y+), IM, Preservative Free, 0.5mL    Exogenous obesity  Lost 8 lbs  Diet and weight loss DW in length with patient. I have advised patient to follow strict lower carbohydrate dietary regimen and engage in  exercise routine for 30-45 minutes at least 3-4  Days per week. Patient was provided with strategies how to  shift from personal maladaptive eating patterns toward healthful eating and exercise.. Behavioral therapy components of self monitoring, goal setting, stimulus control and social support were emphasized.  Along with above, I am prescribing this patient  Phentermin (  Her BMI is above 30)  While staying on this short term Phentermin treatment regimen, the goal is to loose at least 1 lbs  of weight per week. 2 month follow up appointment is recommended.        Essential (primary) hypertension-  her blood pressure has been in good range,   at this time no prescription is needed,   patient will continue to monitor this closely      Hyperlipidemia  Cholesterol, Total 11/22/2024 184    Triglycerides 11/22/2024 95    HDL 11/22/2024 61 (H)    LDL Cholesterol 11/22/2024 104 (H)    Prior LDL elevated 145  Healthy, mostly fiber rich nonstarchy plant-based diet recommended  Recommend to decrease intake of processed foods, simple carbohydrates and animal-based products that high in saturated fats         Obesity- bmi 31/32)  Now on diet  IFG a1c 5.9     Anxiety/ stress  Some better  RX Pristiq 100 mg p.o. daily  DC BuSpar     Psoriasis  Follows dermatology  On Cosentyx

## 2024-12-11 ENCOUNTER — OFFICE VISIT (OUTPATIENT)
Dept: INTERNAL MEDICINE | Age: 54
End: 2024-12-11
Payer: COMMERCIAL

## 2024-12-11 VITALS
HEART RATE: 100 BPM | SYSTOLIC BLOOD PRESSURE: 120 MMHG | HEIGHT: 68 IN | TEMPERATURE: 98 F | OXYGEN SATURATION: 98 % | DIASTOLIC BLOOD PRESSURE: 76 MMHG | WEIGHT: 202.6 LBS | BODY MASS INDEX: 30.71 KG/M2

## 2024-12-11 DIAGNOSIS — J01.00 ACUTE NON-RECURRENT MAXILLARY SINUSITIS: Primary | ICD-10-CM

## 2024-12-11 DIAGNOSIS — R09.82 POSTNASAL DRIP: ICD-10-CM

## 2024-12-11 DIAGNOSIS — R51.9 SINUS HEADACHE: ICD-10-CM

## 2024-12-11 LAB
INFLUENZA A ANTIGEN, POC: NEGATIVE
INFLUENZA B ANTIGEN, POC: NEGATIVE
LOT EXPIRE DATE: NORMAL
LOT KIT NUMBER: NORMAL
S PYO AG THROAT QL: NORMAL
SARS-COV-2, POC: NORMAL
VALID INTERNAL CONTROL: NORMAL
VENDOR AND KIT NAME POC: NORMAL

## 2024-12-11 PROCEDURE — 3078F DIAST BP <80 MM HG: CPT | Performed by: INTERNAL MEDICINE

## 2024-12-11 PROCEDURE — 99213 OFFICE O/P EST LOW 20 MIN: CPT | Performed by: INTERNAL MEDICINE

## 2024-12-11 PROCEDURE — 87428 SARSCOV & INF VIR A&B AG IA: CPT | Performed by: INTERNAL MEDICINE

## 2024-12-11 PROCEDURE — 3074F SYST BP LT 130 MM HG: CPT | Performed by: INTERNAL MEDICINE

## 2024-12-11 PROCEDURE — 87880 STREP A ASSAY W/OPTIC: CPT | Performed by: INTERNAL MEDICINE

## 2024-12-11 RX ORDER — METHYLPREDNISOLONE ACETATE 80 MG/ML
80 INJECTION, SUSPENSION INTRA-ARTICULAR; INTRALESIONAL; INTRAMUSCULAR; SOFT TISSUE ONCE
Status: COMPLETED | OUTPATIENT
Start: 2024-12-11 | End: 2024-12-11

## 2024-12-11 RX ORDER — CEFDINIR 300 MG/1
300 CAPSULE ORAL 2 TIMES DAILY
Qty: 14 CAPSULE | Refills: 0 | Status: SHIPPED | OUTPATIENT
Start: 2024-12-11 | End: 2024-12-18

## 2024-12-11 RX ADMIN — METHYLPREDNISOLONE ACETATE 80 MG: 80 INJECTION, SUSPENSION INTRA-ARTICULAR; INTRALESIONAL; INTRAMUSCULAR; SOFT TISSUE at 08:26

## 2024-12-11 SDOH — ECONOMIC STABILITY: INCOME INSECURITY: HOW HARD IS IT FOR YOU TO PAY FOR THE VERY BASICS LIKE FOOD, HOUSING, MEDICAL CARE, AND HEATING?: NOT HARD AT ALL

## 2024-12-11 SDOH — ECONOMIC STABILITY: FOOD INSECURITY: WITHIN THE PAST 12 MONTHS, YOU WORRIED THAT YOUR FOOD WOULD RUN OUT BEFORE YOU GOT MONEY TO BUY MORE.: NEVER TRUE

## 2024-12-11 SDOH — ECONOMIC STABILITY: FOOD INSECURITY: WITHIN THE PAST 12 MONTHS, THE FOOD YOU BOUGHT JUST DIDN'T LAST AND YOU DIDN'T HAVE MONEY TO GET MORE.: NEVER TRUE

## 2024-12-11 ASSESSMENT — PATIENT HEALTH QUESTIONNAIRE - PHQ9
SUM OF ALL RESPONSES TO PHQ QUESTIONS 1-9: 2
SUM OF ALL RESPONSES TO PHQ QUESTIONS 1-9: 2
8. MOVING OR SPEAKING SO SLOWLY THAT OTHER PEOPLE COULD HAVE NOTICED. OR THE OPPOSITE, BEING SO FIGETY OR RESTLESS THAT YOU HAVE BEEN MOVING AROUND A LOT MORE THAN USUAL: NOT AT ALL
4. FEELING TIRED OR HAVING LITTLE ENERGY: SEVERAL DAYS
2. FEELING DOWN, DEPRESSED OR HOPELESS: NOT AT ALL
1. LITTLE INTEREST OR PLEASURE IN DOING THINGS: NOT AT ALL
3. TROUBLE FALLING OR STAYING ASLEEP: SEVERAL DAYS
10. IF YOU CHECKED OFF ANY PROBLEMS, HOW DIFFICULT HAVE THESE PROBLEMS MADE IT FOR YOU TO DO YOUR WORK, TAKE CARE OF THINGS AT HOME, OR GET ALONG WITH OTHER PEOPLE: NOT DIFFICULT AT ALL
5. POOR APPETITE OR OVEREATING: NOT AT ALL
6. FEELING BAD ABOUT YOURSELF - OR THAT YOU ARE A FAILURE OR HAVE LET YOURSELF OR YOUR FAMILY DOWN: NOT AT ALL
SUM OF ALL RESPONSES TO PHQ QUESTIONS 1-9: 2
7. TROUBLE CONCENTRATING ON THINGS, SUCH AS READING THE NEWSPAPER OR WATCHING TELEVISION: NOT AT ALL
9. THOUGHTS THAT YOU WOULD BE BETTER OFF DEAD, OR OF HURTING YOURSELF: NOT AT ALL
SUM OF ALL RESPONSES TO PHQ9 QUESTIONS 1 & 2: 0
SUM OF ALL RESPONSES TO PHQ QUESTIONS 1-9: 2

## 2024-12-11 ASSESSMENT — ENCOUNTER SYMPTOMS
CONSTIPATION: 0
SINUS PRESSURE: 1
COUGH: 1
RHINORRHEA: 1
CHEST TIGHTNESS: 0
WHEEZING: 0
ABDOMINAL PAIN: 0
SINUS PAIN: 1
SORE THROAT: 0

## 2024-12-11 NOTE — PROGRESS NOTES
Chief Complaint   Patient presents with    Congestion     Chest and head congestion since last Thursday     Headache    Pharyngitis     History of presenting illness:  Rosalba Tirado is a54 y.o. female who presents today for follow up on her chronic medical conditions as noted below.    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    Generalized anxiety disorder 10/22/2017    IFG (impaired fasting glucose) 10/22/2017    Psoriasis 10/22/2017    IBS (irritable bowel syndrome) 10/22/2017    History of bone density study      Overview Note:     4/2018 normal      Abnormal liver function 08/15/2017    Foot fracture, left 08/15/2017    Foot pain 08/15/2017    Altered bowel function 10/31/2016     Overview Note:     Updating Deprecated Diagnoses      Elevated blood pressure 10/15/2016    Essential (primary) hypertension 10/15/2016     Past Medical History:   Diagnosis Date    Acquired deviated nasal septum     Anal bleeding 10/31/2016    Ankle injury     Asthma     Benign paroxysmal positional vertigo     Derangement of medial meniscus     L knee mild, clinical diagnosis 9/19/12 after turning foot and popping sound 9/16/12 while walking and turn.    Eustachian tube dysfunction     Excessive sleepiness     During the day    Generalized anxiety disorder     H/O varicose veins     History of sprain of foot     Osteoarthritis     Pain in joint of left knee     Palpitations     Psoriasis       Past Surgical History:   Procedure Laterality Date    APPENDECTOMY      BREAST SURGERY      lift and a left implant    CARPAL TUNNEL RELEASE Right     COLONOSCOPY  1992    Dr. [place]    COLONOSCOPY N/A 12/12/2022    Dr Rainey, (-)Micro Colitis, sm patch of erythema in distal rectum, Mod diverticulosis left colon, int hem Gr 1, Sub prep no adequate Repeat

## 2024-12-12 RX ORDER — DESVENLAFAXINE 100 MG/1
100 TABLET, EXTENDED RELEASE ORAL DAILY
Qty: 90 TABLET | Refills: 1 | Status: SHIPPED | OUTPATIENT
Start: 2024-12-12

## 2024-12-12 NOTE — TELEPHONE ENCOUNTER
Rosalba G Brandon called to request a refill on her medication.      Last office visit : 12/11/2024   Next office visit : 5/27/2025     Requested Prescriptions     Pending Prescriptions Disp Refills    desvenlafaxine succinate (PRISTIQ) 100 MG TB24 extended release tablet [Pharmacy Med Name: DESVENLAFAXINE ER SUCCINATE 100MG T] 90 tablet 1     Sig: TAKE 1 TABLET BY MOUTH DAILY            Beata Flannery MA

## 2024-12-31 ENCOUNTER — OFFICE VISIT (OUTPATIENT)
Dept: INTERNAL MEDICINE | Age: 54
End: 2024-12-31
Payer: COMMERCIAL

## 2024-12-31 VITALS
HEIGHT: 68 IN | BODY MASS INDEX: 30.62 KG/M2 | HEART RATE: 70 BPM | OXYGEN SATURATION: 98 % | WEIGHT: 202 LBS | TEMPERATURE: 97.2 F

## 2024-12-31 DIAGNOSIS — J10.1 INFLUENZA A: ICD-10-CM

## 2024-12-31 DIAGNOSIS — J11.1 INFLUENZA: Primary | ICD-10-CM

## 2024-12-31 PROCEDURE — 99214 OFFICE O/P EST MOD 30 MIN: CPT | Performed by: INTERNAL MEDICINE

## 2024-12-31 PROCEDURE — 96372 THER/PROPH/DIAG INJ SC/IM: CPT | Performed by: INTERNAL MEDICINE

## 2024-12-31 RX ORDER — METHYLPREDNISOLONE ACETATE 80 MG/ML
80 INJECTION, SUSPENSION INTRA-ARTICULAR; INTRALESIONAL; INTRAMUSCULAR; SOFT TISSUE ONCE
Status: COMPLETED | OUTPATIENT
Start: 2024-12-31 | End: 2024-12-31

## 2024-12-31 RX ORDER — OSELTAMIVIR PHOSPHATE 75 MG/1
75 CAPSULE ORAL 2 TIMES DAILY
Qty: 10 CAPSULE | Refills: 0 | Status: SHIPPED | OUTPATIENT
Start: 2024-12-31 | End: 2025-01-05

## 2024-12-31 RX ADMIN — METHYLPREDNISOLONE ACETATE 80 MG: 80 INJECTION, SUSPENSION INTRA-ARTICULAR; INTRALESIONAL; INTRAMUSCULAR; SOFT TISSUE at 13:26

## 2024-12-31 ASSESSMENT — ENCOUNTER SYMPTOMS
ABDOMINAL PAIN: 0
SHORTNESS OF BREATH: 0
BLOOD IN STOOL: 0
NAUSEA: 0
SORE THROAT: 1
EYE DISCHARGE: 0
WHEEZING: 0
TROUBLE SWALLOWING: 0
VOMITING: 0
BACK PAIN: 0
ABDOMINAL DISTENTION: 0
EYE ITCHING: 0
SINUS PRESSURE: 1

## 2024-12-31 NOTE — PROGRESS NOTES
[facility]  [facility]  [address] [place] [address] DRIVE  [address]  [facility] [id number]  Dept: [phone]  Dept Fax: [phone]  Loc: [phone]      Visit Date: 12/31/2024    Rosalba Conley a 54 y.o. female who presents today for:  Chief Complaint   Patient presents with    Congestion    Cough     Neck pain, ear pain both          HPI:     Patient here with neck pain ear pain and she just tested for flu over the weekend.  Feels    Past Medical History:   Diagnosis Date    Acquired deviated nasal septum     Anal bleeding 10/31/2016    Ankle injury     Asthma     Benign paroxysmal positional vertigo     Derangement of medial meniscus     L knee mild, clinical diagnosis 9/19/12 after turning foot and popping sound 9/16/12 while walking and turn.    Eustachian tube dysfunction     Excessive sleepiness     During the day    Generalized anxiety disorder     H/O varicose veins     History of sprain of foot     Osteoarthritis     Pain in joint of left knee     Palpitations     Psoriasis       Past Surgical History:   Procedure Laterality Date    APPENDECTOMY      BREAST SURGERY      lift and a left implant    CARPAL TUNNEL RELEASE Right     COLONOSCOPY  1992    [place]. [place]    COLONOSCOPY N/A 12/12/2022    Dr Rainey, (-)Micro Colitis, sm patch of erythema in distal rectum, Mod diverticulosis left colon, int hem Gr 1, Sub prep no adequate Repeat 12-13-22    COLONOSCOPY N/A 12/13/2022    Dr Rainey, sm erosions in areas of biopsies done from day previous, sm ulcerations wo bleeding at site of polypectomy, tortuous redundant colon, int hem Gr 1 likely recent blood from rectum but wo at time of exam, 5 year recall    ENDOSCOPY, COLON, DIAGNOSTIC      KNEE ARTHROSCOPY Bilateral     WI COLONOSCOPY FLX DX W/COLLJ SPEC WHEN PFRMD N/A 12/19/2016    Dr [facility]    SINUS SURGERY      nasal septoplasty/ maxillary antrostomies    TONSILLECTOMY      UPPER GASTROINTESTINAL

## 2025-01-17 DIAGNOSIS — M79.10 MUSCLE ACHE: ICD-10-CM

## 2025-01-17 DIAGNOSIS — M79.10 MUSCLE ACHE: Primary | ICD-10-CM

## 2025-01-17 LAB
ALBUMIN SERPL-MCNC: 4 G/DL (ref 3.5–5.2)
ALP SERPL-CCNC: 102 U/L (ref 35–104)
ALT SERPL-CCNC: 23 U/L (ref 5–33)
ANION GAP SERPL CALCULATED.3IONS-SCNC: 11 MMOL/L (ref 7–19)
AST SERPL-CCNC: 20 U/L (ref 5–32)
BILIRUB SERPL-MCNC: 0.2 MG/DL (ref 0.2–1.2)
BUN SERPL-MCNC: 22 MG/DL (ref 6–20)
CALCIUM SERPL-MCNC: 9.4 MG/DL (ref 8.6–10)
CHLORIDE SERPL-SCNC: 102 MMOL/L (ref 98–111)
CO2 SERPL-SCNC: 29 MMOL/L (ref 22–29)
CREAT SERPL-MCNC: 0.6 MG/DL (ref 0.5–0.9)
GLUCOSE SERPL-MCNC: 124 MG/DL (ref 70–99)
POTASSIUM SERPL-SCNC: 4.1 MMOL/L (ref 3.5–5)
PROT SERPL-MCNC: 7.8 G/DL (ref 6.4–8.3)
SODIUM SERPL-SCNC: 142 MMOL/L (ref 136–145)

## 2025-01-23 LAB
NCCN CRITERIA FLAG: NORMAL
TYRER CUZICK SCORE: 18.1

## 2025-01-28 ENCOUNTER — OFFICE VISIT (OUTPATIENT)
Dept: OBGYN CLINIC | Age: 55
End: 2025-01-28
Payer: COMMERCIAL

## 2025-01-28 VITALS
HEIGHT: 68 IN | DIASTOLIC BLOOD PRESSURE: 78 MMHG | HEART RATE: 96 BPM | SYSTOLIC BLOOD PRESSURE: 117 MMHG | BODY MASS INDEX: 31.22 KG/M2 | WEIGHT: 206 LBS

## 2025-01-28 DIAGNOSIS — Z12.4 SCREENING FOR CERVICAL CANCER: ICD-10-CM

## 2025-01-28 DIAGNOSIS — Z01.419 WELL WOMAN EXAM: Primary | ICD-10-CM

## 2025-01-28 DIAGNOSIS — Z11.51 SCREENING FOR HPV (HUMAN PAPILLOMAVIRUS): ICD-10-CM

## 2025-01-28 PROCEDURE — 3074F SYST BP LT 130 MM HG: CPT | Performed by: OBSTETRICS & GYNECOLOGY

## 2025-01-28 PROCEDURE — 3078F DIAST BP <80 MM HG: CPT | Performed by: OBSTETRICS & GYNECOLOGY

## 2025-01-28 PROCEDURE — 99386 PREV VISIT NEW AGE 40-64: CPT | Performed by: OBSTETRICS & GYNECOLOGY

## 2025-01-28 SDOH — ECONOMIC STABILITY: FOOD INSECURITY: WITHIN THE PAST 12 MONTHS, YOU WORRIED THAT YOUR FOOD WOULD RUN OUT BEFORE YOU GOT MONEY TO BUY MORE.: NEVER TRUE

## 2025-01-28 SDOH — ECONOMIC STABILITY: FOOD INSECURITY: WITHIN THE PAST 12 MONTHS, THE FOOD YOU BOUGHT JUST DIDN'T LAST AND YOU DIDN'T HAVE MONEY TO GET MORE.: NEVER TRUE

## 2025-01-28 ASSESSMENT — ENCOUNTER SYMPTOMS
GASTROINTESTINAL NEGATIVE: 1
RESPIRATORY NEGATIVE: 1

## 2025-01-28 ASSESSMENT — PATIENT HEALTH QUESTIONNAIRE - PHQ9
SUM OF ALL RESPONSES TO PHQ9 QUESTIONS 1 & 2: 0
SUM OF ALL RESPONSES TO PHQ QUESTIONS 1-9: 0
2. FEELING DOWN, DEPRESSED OR HOPELESS: NOT AT ALL
SUM OF ALL RESPONSES TO PHQ QUESTIONS 1-9: 0
1. LITTLE INTEREST OR PLEASURE IN DOING THINGS: NOT AT ALL

## 2025-01-28 NOTE — PROGRESS NOTES
SUBJECTIVE:  Rosalba Tirado is a 54 y.o.  who is here for annual exam and is without complaints.      Review of Systems   Constitutional: Negative.    Respiratory: Negative.     Cardiovascular: Negative.    Gastrointestinal: Negative.    Genitourinary: Negative.    Musculoskeletal:  Positive for arthralgias.   Neurological: Negative.    Psychiatric/Behavioral: Negative.     All other systems reviewed and are negative.      GYN HX:   No LMP recorded. Patient is postmenopausal.  Abnormal Bleeding/Menses: none  Abnormal pap smear: Pt has h/o abnormal paps and is S/P colpo.    Social History     Substance and Sexual Activity   Sexual Activity Not on file     OB History    No obstetric history on file.         Because violence is so common, we ask all our patients: are you in a relationship or do you live with a person who threatens, hurts, orcontrols you: No    Past Medical History:   Diagnosis Date    Acquired deviated nasal septum     Anal bleeding 10/31/2016    Ankle injury     Asthma     Benign paroxysmal positional vertigo     Derangement of medial meniscus     L knee mild, clinical diagnosis 12 after turning foot and popping sound 12 while walking and turn.    Eustachian tube dysfunction     Excessive sleepiness     During the day    Generalized anxiety disorder     H/O varicose veins     History of sprain of foot     Osteoarthritis     Pain in joint of left knee     Palpitations     Psoriasis      Past Surgical History:   Procedure Laterality Date    APPENDECTOMY      BREAST SURGERY      lift and a left implant    CARPAL TUNNEL RELEASE Right     COLONOSCOPY      Dr. [place]    COLONOSCOPY N/A 2022    Dr Rainey, (-)Micro Colitis, sm patch of erythema in distal rectum, Mod diverticulosis left colon, int hem Gr 1, Sub prep no adequate Repeat 22    COLONOSCOPY N/A 2022    Dr Rainey, sm erosions in areas of biopsies done from day previous, sm ulcerations wo

## 2025-01-29 ENCOUNTER — HOSPITAL ENCOUNTER (OUTPATIENT)
Dept: MAMMOGRAPHY | Facility: HOSPITAL | Age: 55
Discharge: HOME | End: 2025-01-29
Admitting: INTERNAL MEDICINE
Payer: COMMERCIAL

## 2025-01-29 DIAGNOSIS — Z12.31 ENCOUNTER FOR SCREENING MAMMOGRAM FOR MALIGNANT NEOPLASM OF BREAST: ICD-10-CM

## 2025-01-29 PROCEDURE — 77063 BREAST TOMOSYNTHESIS BI: CPT

## 2025-01-29 PROCEDURE — 77067 SCR MAMMO BI INCL CAD: CPT

## 2025-01-30 PROBLEM — J10.1 INFLUENZA A: Status: RESOLVED | Noted: 2024-12-31 | Resolved: 2025-01-30

## 2025-01-30 LAB
HPV HR 12 DNA SPEC QL NAA+PROBE: NOT DETECTED
HPV16 DNA SPEC QL NAA+PROBE: NOT DETECTED
HPV16+18+H RISK 12 DNA SPEC-IMP: NORMAL
HPV18 DNA SPEC QL NAA+PROBE: NOT DETECTED

## 2025-03-01 ENCOUNTER — APPOINTMENT (OUTPATIENT)
Dept: CT IMAGING | Facility: HOSPITAL | Age: 55
End: 2025-03-01
Payer: COMMERCIAL

## 2025-03-01 ENCOUNTER — HOSPITAL ENCOUNTER (INPATIENT)
Facility: HOSPITAL | Age: 55
LOS: 3 days | Discharge: HOME | End: 2025-03-04
Attending: FAMILY MEDICINE | Admitting: INTERNAL MEDICINE
Payer: COMMERCIAL

## 2025-03-01 DIAGNOSIS — K56.609 SBO (SMALL BOWEL OBSTRUCTION): Primary | ICD-10-CM

## 2025-03-01 DIAGNOSIS — K59.00 CONSTIPATION, UNSPECIFIED CONSTIPATION TYPE: ICD-10-CM

## 2025-03-01 DIAGNOSIS — R11.2 NAUSEA AND VOMITING, UNSPECIFIED VOMITING TYPE: ICD-10-CM

## 2025-03-01 LAB
ALBUMIN SERPL-MCNC: 4.3 G/DL (ref 3.5–5.2)
ALBUMIN/GLOB SERPL: 1.1 G/DL
ALP SERPL-CCNC: 94 U/L (ref 39–117)
ALT SERPL W P-5'-P-CCNC: 25 U/L (ref 1–33)
ANION GAP SERPL CALCULATED.3IONS-SCNC: 14 MMOL/L (ref 5–15)
AST SERPL-CCNC: 23 U/L (ref 1–32)
BACTERIA UR QL AUTO: ABNORMAL /HPF
BASOPHILS # BLD AUTO: 0.05 10*3/MM3 (ref 0–0.2)
BASOPHILS NFR BLD AUTO: 0.4 % (ref 0–1.5)
BILIRUB SERPL-MCNC: 0.3 MG/DL (ref 0–1.2)
BILIRUB UR QL STRIP: NEGATIVE
BUN SERPL-MCNC: 14 MG/DL (ref 6–20)
BUN/CREAT SERPL: 21.5 (ref 7–25)
CALCIUM SPEC-SCNC: 9.9 MG/DL (ref 8.6–10.5)
CHLORIDE SERPL-SCNC: 100 MMOL/L (ref 98–107)
CLARITY UR: CLEAR
CO2 SERPL-SCNC: 26 MMOL/L (ref 22–29)
COLOR UR: YELLOW
CREAT SERPL-MCNC: 0.65 MG/DL (ref 0.57–1)
DEPRECATED RDW RBC AUTO: 47.4 FL (ref 37–54)
EGFRCR SERPLBLD CKD-EPI 2021: 103.5 ML/MIN/1.73
EOSINOPHIL # BLD AUTO: 0.12 10*3/MM3 (ref 0–0.4)
EOSINOPHIL NFR BLD AUTO: 1 % (ref 0.3–6.2)
ERYTHROCYTE [DISTWIDTH] IN BLOOD BY AUTOMATED COUNT: 14.2 % (ref 12.3–15.4)
GLOBULIN UR ELPH-MCNC: 4 GM/DL
GLUCOSE SERPL-MCNC: 107 MG/DL (ref 65–99)
GLUCOSE UR STRIP-MCNC: NEGATIVE MG/DL
HCT VFR BLD AUTO: 48 % (ref 34–46.6)
HGB BLD-MCNC: 15.5 G/DL (ref 12–15.9)
HGB UR QL STRIP.AUTO: NEGATIVE
HYALINE CASTS UR QL AUTO: ABNORMAL /LPF
IMM GRANULOCYTES # BLD AUTO: 0.06 10*3/MM3 (ref 0–0.05)
IMM GRANULOCYTES NFR BLD AUTO: 0.5 % (ref 0–0.5)
KETONES UR QL STRIP: ABNORMAL
LEUKOCYTE ESTERASE UR QL STRIP.AUTO: ABNORMAL
LIPASE SERPL-CCNC: 73 U/L (ref 13–60)
LYMPHOCYTES # BLD AUTO: 2.6 10*3/MM3 (ref 0.7–3.1)
LYMPHOCYTES NFR BLD AUTO: 22.1 % (ref 19.6–45.3)
MCH RBC QN AUTO: 29.1 PG (ref 26.6–33)
MCHC RBC AUTO-ENTMCNC: 32.3 G/DL (ref 31.5–35.7)
MCV RBC AUTO: 90.2 FL (ref 79–97)
MONOCYTES # BLD AUTO: 0.93 10*3/MM3 (ref 0.1–0.9)
MONOCYTES NFR BLD AUTO: 7.9 % (ref 5–12)
NEUTROPHILS NFR BLD AUTO: 68.1 % (ref 42.7–76)
NEUTROPHILS NFR BLD AUTO: 8.03 10*3/MM3 (ref 1.7–7)
NITRITE UR QL STRIP: NEGATIVE
NRBC BLD AUTO-RTO: 0 /100 WBC (ref 0–0.2)
PH UR STRIP.AUTO: 8.5 [PH] (ref 5–8)
PLATELET # BLD AUTO: 341 10*3/MM3 (ref 140–450)
PMV BLD AUTO: 10.3 FL (ref 6–12)
POTASSIUM SERPL-SCNC: 4.1 MMOL/L (ref 3.5–5.2)
PROT SERPL-MCNC: 8.3 G/DL (ref 6–8.5)
PROT UR QL STRIP: NEGATIVE
RBC # BLD AUTO: 5.32 10*6/MM3 (ref 3.77–5.28)
RBC # UR STRIP: ABNORMAL /HPF
REF LAB TEST METHOD: ABNORMAL
SODIUM SERPL-SCNC: 140 MMOL/L (ref 136–145)
SP GR UR STRIP: 1.02 (ref 1–1.03)
SQUAMOUS #/AREA URNS HPF: ABNORMAL /HPF
UROBILINOGEN UR QL STRIP: ABNORMAL
WBC # UR STRIP: ABNORMAL /HPF
WBC NRBC COR # BLD AUTO: 11.79 10*3/MM3 (ref 3.4–10.8)

## 2025-03-01 PROCEDURE — 81001 URINALYSIS AUTO W/SCOPE: CPT

## 2025-03-01 PROCEDURE — 25810000003 SODIUM CHLORIDE 0.9 % SOLUTION: Performed by: FAMILY MEDICINE

## 2025-03-01 PROCEDURE — 99285 EMERGENCY DEPT VISIT HI MDM: CPT

## 2025-03-01 PROCEDURE — 25810000003 SODIUM CHLORIDE 0.9 % SOLUTION

## 2025-03-01 PROCEDURE — 25510000001 IOPAMIDOL 61 % SOLUTION

## 2025-03-01 PROCEDURE — 80053 COMPREHEN METABOLIC PANEL: CPT

## 2025-03-01 PROCEDURE — 25010000002 HYDROMORPHONE PER 4 MG: Performed by: FAMILY MEDICINE

## 2025-03-01 PROCEDURE — 83690 ASSAY OF LIPASE: CPT

## 2025-03-01 PROCEDURE — 25010000002 ONDANSETRON PER 1 MG

## 2025-03-01 PROCEDURE — 74177 CT ABD & PELVIS W/CONTRAST: CPT

## 2025-03-01 PROCEDURE — 85025 COMPLETE CBC W/AUTO DIFF WBC: CPT

## 2025-03-01 PROCEDURE — 25010000002 ONDANSETRON PER 1 MG: Performed by: FAMILY MEDICINE

## 2025-03-01 RX ORDER — ACETAMINOPHEN 325 MG/1
650 TABLET ORAL EVERY 4 HOURS PRN
Status: DISCONTINUED | OUTPATIENT
Start: 2025-03-01 | End: 2025-03-04 | Stop reason: HOSPADM

## 2025-03-01 RX ORDER — IOPAMIDOL 612 MG/ML
100 INJECTION, SOLUTION INTRAVASCULAR
Status: COMPLETED | OUTPATIENT
Start: 2025-03-01 | End: 2025-03-01

## 2025-03-01 RX ORDER — ACETAMINOPHEN 160 MG/5ML
650 SOLUTION ORAL EVERY 4 HOURS PRN
Status: DISCONTINUED | OUTPATIENT
Start: 2025-03-01 | End: 2025-03-04 | Stop reason: HOSPADM

## 2025-03-01 RX ORDER — SODIUM CHLORIDE 0.9 % (FLUSH) 0.9 %
10 SYRINGE (ML) INJECTION AS NEEDED
Status: DISCONTINUED | OUTPATIENT
Start: 2025-03-01 | End: 2025-03-04 | Stop reason: HOSPADM

## 2025-03-01 RX ORDER — SODIUM CHLORIDE 9 MG/ML
40 INJECTION, SOLUTION INTRAVENOUS AS NEEDED
Status: DISCONTINUED | OUTPATIENT
Start: 2025-03-01 | End: 2025-03-04 | Stop reason: HOSPADM

## 2025-03-01 RX ORDER — ACETAMINOPHEN 650 MG/1
650 SUPPOSITORY RECTAL EVERY 4 HOURS PRN
Status: DISCONTINUED | OUTPATIENT
Start: 2025-03-01 | End: 2025-03-04 | Stop reason: HOSPADM

## 2025-03-01 RX ORDER — SODIUM CHLORIDE 0.9 % (FLUSH) 0.9 %
10 SYRINGE (ML) INJECTION EVERY 12 HOURS SCHEDULED
Status: DISCONTINUED | OUTPATIENT
Start: 2025-03-01 | End: 2025-03-04 | Stop reason: HOSPADM

## 2025-03-01 RX ORDER — HYDROMORPHONE HYDROCHLORIDE 1 MG/ML
0.5 INJECTION, SOLUTION INTRAMUSCULAR; INTRAVENOUS; SUBCUTANEOUS ONCE
Status: COMPLETED | OUTPATIENT
Start: 2025-03-01 | End: 2025-03-01

## 2025-03-01 RX ORDER — ONDANSETRON 2 MG/ML
4 INJECTION INTRAMUSCULAR; INTRAVENOUS ONCE
Status: COMPLETED | OUTPATIENT
Start: 2025-03-01 | End: 2025-03-01

## 2025-03-01 RX ORDER — DESVENLAFAXINE 50 MG/1
50 TABLET, FILM COATED, EXTENDED RELEASE ORAL DAILY
Status: DISCONTINUED | OUTPATIENT
Start: 2025-03-02 | End: 2025-03-04 | Stop reason: HOSPADM

## 2025-03-01 RX ORDER — HYDROMORPHONE HYDROCHLORIDE 1 MG/ML
0.5 INJECTION, SOLUTION INTRAMUSCULAR; INTRAVENOUS; SUBCUTANEOUS
Status: DISCONTINUED | OUTPATIENT
Start: 2025-03-01 | End: 2025-03-03

## 2025-03-01 RX ORDER — SODIUM CHLORIDE 9 MG/ML
100 INJECTION, SOLUTION INTRAVENOUS CONTINUOUS
Status: DISCONTINUED | OUTPATIENT
Start: 2025-03-01 | End: 2025-03-03

## 2025-03-01 RX ORDER — ONDANSETRON 2 MG/ML
4 INJECTION INTRAMUSCULAR; INTRAVENOUS EVERY 6 HOURS PRN
Status: DISCONTINUED | OUTPATIENT
Start: 2025-03-01 | End: 2025-03-04 | Stop reason: HOSPADM

## 2025-03-01 RX ORDER — NALOXONE HCL 0.4 MG/ML
0.4 VIAL (ML) INJECTION
Status: DISCONTINUED | OUTPATIENT
Start: 2025-03-01 | End: 2025-03-03

## 2025-03-01 RX ADMIN — ONDANSETRON 4 MG: 2 INJECTION INTRAMUSCULAR; INTRAVENOUS at 22:23

## 2025-03-01 RX ADMIN — ONDANSETRON 4 MG: 2 INJECTION INTRAMUSCULAR; INTRAVENOUS at 16:59

## 2025-03-01 RX ADMIN — SODIUM CHLORIDE 100 ML/HR: 9 INJECTION, SOLUTION INTRAVENOUS at 22:26

## 2025-03-01 RX ADMIN — HYDROMORPHONE HYDROCHLORIDE 0.5 MG: 1 INJECTION, SOLUTION INTRAMUSCULAR; INTRAVENOUS; SUBCUTANEOUS at 22:33

## 2025-03-01 RX ADMIN — IOPAMIDOL 100 ML: 612 INJECTION, SOLUTION INTRAVENOUS at 17:24

## 2025-03-01 RX ADMIN — SODIUM CHLORIDE 1000 ML: 9 INJECTION, SOLUTION INTRAVENOUS at 19:30

## 2025-03-01 RX ADMIN — Medication 10 ML: at 22:58

## 2025-03-01 RX ADMIN — HYDROMORPHONE HYDROCHLORIDE 0.5 MG: 1 INJECTION, SOLUTION INTRAMUSCULAR; INTRAVENOUS; SUBCUTANEOUS at 19:30

## 2025-03-01 NOTE — ED PROVIDER NOTES
Subjective   History of Present Illness  Patient is a 54-year-old female who presents to the ED today complaining of epigastric and right upper quadrant abdominal pain, sudden onset at noon today after eating cookies.  She states she has had significant gas over the last month.  She did have 1 episode of vomiting today, tried Gas-X and heating pad with no relief.  She denies any diarrhea, constipation, dysuria, fever, or other systemic symptoms.  On exam there is tenderness noted to the right upper quadrant in the epigastric region.  She denies any alcohol use, only previous abdominal surgery is an appendectomy.  Denies any vaginal related complaints.  PMH is all significant for anxiety, arthritis, and depression.  Differential diagnoses: GERD, gastroenteritis, pancreatitis, UTI, electrolyte imbalance, and other.    History provided by:  Patient   used: No        Review of Systems   Constitutional: Negative.    HENT: Negative.     Eyes: Negative.    Respiratory: Negative.     Cardiovascular: Negative.    Gastrointestinal:  Positive for abdominal pain, constipation, nausea and vomiting. Negative for diarrhea.   Genitourinary: Negative.    Musculoskeletal: Negative.    Skin: Negative.    Neurological: Negative.    Psychiatric/Behavioral: Negative.         Past Medical History:   Diagnosis Date    Anxiety     Arthritis     Depression        No Known Allergies    Past Surgical History:   Procedure Laterality Date    APPENDECTOMY      AUGMENTATION MAMMAPLASTY Left 2007    pt had a lift and implant on the left side only    BREAST SURGERY Left     lifted and implant    KNEE SURGERY      SINUS SURGERY      TONSILLECTOMY         Family History   Problem Relation Age of Onset    Anuerysm Mother     Diabetes Father     Breast cancer Paternal Aunt     Breast cancer Paternal Aunt        Social History     Socioeconomic History    Marital status:    Tobacco Use    Smoking status: Never   Vaping Use     Vaping status: Never Used   Substance and Sexual Activity    Alcohol use: Not Currently    Drug use: No    Sexual activity: Defer       Objective   Physical Exam  Vitals and nursing note reviewed.   Constitutional:       General: She is not in acute distress.     Appearance: Normal appearance. She is not toxic-appearing or diaphoretic.   HENT:      Head: Normocephalic and atraumatic.      Right Ear: External ear normal.      Left Ear: External ear normal.      Nose: Nose normal.      Mouth/Throat:      Mouth: Mucous membranes are moist.   Eyes:      Conjunctiva/sclera: Conjunctivae normal.   Cardiovascular:      Rate and Rhythm: Normal rate and regular rhythm.      Pulses: Normal pulses.      Heart sounds: Normal heart sounds.   Pulmonary:      Effort: Pulmonary effort is normal.      Breath sounds: Normal breath sounds.   Abdominal:      General: Bowel sounds are decreased. There is no distension.      Palpations: Abdomen is soft.      Tenderness: There is abdominal tenderness in the right upper quadrant and epigastric area. There is no guarding or rebound.   Skin:     General: Skin is warm and dry.   Neurological:      Mental Status: She is alert and oriented to person, place, and time. Mental status is at baseline.      GCS: GCS eye subscore is 4. GCS verbal subscore is 5. GCS motor subscore is 6.   Psychiatric:         Mood and Affect: Mood normal.         Behavior: Behavior normal.         Thought Content: Thought content normal.         Judgment: Judgment normal.       Labs Reviewed   COMPREHENSIVE METABOLIC PANEL - Abnormal; Notable for the following components:       Result Value    Glucose 107 (*)     All other components within normal limits    Narrative:     GFR Categories in Chronic Kidney Disease (CKD)      GFR Category          GFR (mL/min/1.73)    Interpretation  G1                     90 or greater         Normal or high (1)  G2                      60-89                Mild decrease (1)  G3a                    45-59                Mild to moderate decrease  G3b                   30-44                Moderate to severe decrease  G4                    15-29                Severe decrease  G5                    14 or less           Kidney failure          (1)In the absence of evidence of kidney disease, neither GFR category G1 or G2 fulfill the criteria for CKD.    eGFR calculation 2021 CKD-EPI creatinine equation, which does not include race as a factor   LIPASE - Abnormal; Notable for the following components:    Lipase 73 (*)     All other components within normal limits   URINALYSIS W/ CULTURE IF INDICATED - Abnormal; Notable for the following components:    pH, UA 8.5 (*)     Ketones, UA Trace (*)     Leuk Esterase, UA Small (1+) (*)     All other components within normal limits    Narrative:     In absence of clinical symptoms, the presence of pyuria, bacteria, and/or nitrites on the urinalysis result does not correlate with infection.   CBC WITH AUTO DIFFERENTIAL - Abnormal; Notable for the following components:    WBC 11.79 (*)     RBC 5.32 (*)     Hematocrit 48.0 (*)     Neutrophils, Absolute 8.03 (*)     Monocytes, Absolute 0.93 (*)     Immature Grans, Absolute 0.06 (*)     All other components within normal limits   URINALYSIS, MICROSCOPIC ONLY - Abnormal; Notable for the following components:    WBC, UA 3-5 (*)     All other components within normal limits   MAGNESIUM   PHOSPHORUS   COMPREHENSIVE METABOLIC PANEL   LIPASE   CBC WITH AUTO DIFFERENTIAL   CBC AND DIFFERENTIAL    Narrative:     The following orders were created for panel order CBC & Differential.  Procedure                               Abnormality         Status                     ---------                               -----------         ------                     CBC Auto Differential[[id number]]        Abnormal            Final result                 Please view results for these tests on the individual orders.   CBC AND DIFFERENTIAL     Narrative:     The following orders were created for panel order CBC & Differential.  Procedure                               Abnormality         Status                     ---------                               -----------         ------                     CBC Auto Differential[[id number]]                                                         Please view results for these tests on the individual orders.     CT Abdomen Pelvis With Contrast   Final Result       1.  I am suspicious that there are early changes of small bowel   obstruction with transition point in the RIGHT mid to upper abdomen.   Decompression of distal small bowel noted. Mild distention of proximal   small bowel loops with maximum diameter of approximately 2.5 cm.       2.  Moderate fecal stasis.        This report was signed and finalized on 3/1/2025 5:48 PM by Dr. Nasim Durán MD.          XR Abdomen KUB    (Results Pending)     Medications   sodium chloride 0.9 % flush 10 mL (has no administration in time range)   desvenlafaxine (PRISTIQ) 24 hr tablet 50 mg (has no administration in time range)   sodium chloride 0.9 % flush 10 mL (10 mL Intravenous Given 3/1/25 0520)   sodium chloride 0.9 % flush 10 mL (has no administration in time range)   sodium chloride 0.9 % infusion 40 mL (has no administration in time range)   sodium chloride 0.9 % infusion (100 mL/hr Intravenous New Bag 3/1/25 2226)   Potassium Replacement - Follow Nurse / BPA Driven Protocol (has no administration in time range)   Magnesium Standard Dose Replacement - Follow Nurse / BPA Driven Protocol (has no administration in time range)   Phosphorus Replacement - Follow Nurse / BPA Driven Protocol (has no administration in time range)   Calcium Replacement - Follow Nurse / BPA Driven Protocol (has no administration in time range)   acetaminophen (TYLENOL) tablet 650 mg (has no administration in time range)     Or   acetaminophen (TYLENOL) 160 MG/5ML oral solution 650 mg (has no  administration in time range)     Or   acetaminophen (TYLENOL) suppository 650 mg (has no administration in time range)   HYDROmorphone (DILAUDID) injection 0.5 mg (0.5 mg Intravenous Given 3/1/25 2233)     And   naloxone (NARCAN) injection 0.4 mg (has no administration in time range)   ondansetron (ZOFRAN) injection 4 mg (4 mg Intravenous Given 3/1/25 2223)   ondansetron (ZOFRAN) injection 4 mg (4 mg Intravenous Given 3/1/25 1659)   iopamidol (ISOVUE-300) 61 % injection 100 mL (100 mL Intravenous Given 3/1/25 1724)   sodium chloride 0.9 % bolus 1,000 mL (1,000 mL Intravenous New Bag 3/1/25 1930)   HYDROmorphone (DILAUDID) injection 0.5 mg (0.5 mg Intravenous Given 3/1/25 1930)     Procedures           ED Course  ED Course as of 03/02/25 0017   Sat Mar 01, 2025   1800 Case discussed with Dr. Garza as well as Dr. Navarro. Dr. Garza recommends enema and PO challenge. He does not feel that this is a true SBO. [HE]   1951 Nursing staff have reported that the patient has not tolerated her PO challenge, will call hospitalist for admission. [HE]   2000 Case discussed with hospitalist on-call, Dr. Valentin who is agreeable to admission. [HE]      ED Course User Index  [HE] Kayy Payne APRN                                                       Medical Decision Making  Patient is a 54-year-old female who presents to the ED today complaining of epigastric and right upper quadrant abdominal pain, sudden onset at noon today after eating cookies.  She states she has had significant gas over the last month.  She did have 1 episode of vomiting today, tried Gas-X and heating pad with no relief.  She denies any diarrhea, constipation, dysuria, fever, or other systemic symptoms.  On exam there is tenderness noted to the right upper quadrant in the epigastric region.  She denies any alcohol use, only previous abdominal surgery is an appendectomy.  Denies any vaginal related complaints.  PMH is all significant for anxiety, arthritis,  and depression.  Differential diagnoses: GERD, gastroenteritis, pancreatitis, UTI, electrolyte imbalance, and other.    Patient was given IV fluids, Zofran, and Dilaudid in the ED for symptom management.    UA is positive for white cells, ketones, and leukocytes.  Nitrite and bacteria negative.  She denies UTI symptoms.  CMP reveals normal renal function and liver function, normal electrolytes.  CBC reveals mild leukocytosis, otherwise normal.  Lipase very mildly elevated at 73.    CT abdomen pelvis reveals I am suspicious that there are early changes of small bowel  obstruction with transition point in the RIGHT mid to upper abdomen.  Decompression of distal small bowel noted. Mild distention of proximal  small bowel loops with maximum diameter of approximately 2.5 cm. Moderate fecal stasis.    Results discussed at bedside.  On reeval patient is complaining of continued pain and has just returned from the bathroom where she had an episode of vomiting.  Case discussed with Dr. Garza as well as Dr. Navarro. Dr. Garza recommends enema and PO challenge. He does not feel that this is a true SBO.  Nursing staff have reported that the patient has not tolerated her PO challenge, has received an enema and had a single watery stool, will call hospitalist for admission.  Case discussed with hospitalist on-call, Dr. Valentin who is agreeable to admission.    Problems Addressed:  Constipation, unspecified constipation type: complicated acute illness or injury  Nausea and vomiting, unspecified vomiting type: complicated acute illness or injury  SBO (small bowel obstruction): complicated acute illness or injury    Amount and/or Complexity of Data Reviewed  Labs: ordered.  Radiology: ordered.    Risk  Prescription drug management.  Decision regarding hospitalization.        Final diagnoses:   SBO (small bowel obstruction)   Nausea and vomiting, unspecified vomiting type   Constipation, unspecified constipation type       ED  Disposition  ED Disposition       ED Disposition   Decision to Admit    Condition   --    Comment   Level of Care: Med/Surg [1]   Diagnosis: SBO (small bowel obstruction) [364004]   Admitting Physician: ABHINAV SALINAS [[id number]]   Certification: I Certify That Inpatient Hospital Services Are Medically Necessary For Greater Than 2 Midnights                 No follow-up provider specified.       Medication List      No changes were made to your prescriptions during this visit.            , Kayy FERMIN, APRN  03/02/25 0017

## 2025-03-02 ENCOUNTER — APPOINTMENT (OUTPATIENT)
Dept: CT IMAGING | Facility: HOSPITAL | Age: 55
End: 2025-03-02
Payer: COMMERCIAL

## 2025-03-02 ENCOUNTER — APPOINTMENT (OUTPATIENT)
Dept: GENERAL RADIOLOGY | Facility: HOSPITAL | Age: 55
End: 2025-03-02
Payer: COMMERCIAL

## 2025-03-02 PROBLEM — F41.9 ANXIETY: Status: ACTIVE | Noted: 2025-03-02

## 2025-03-02 PROBLEM — D72.829 LEUKOCYTOSIS: Status: ACTIVE | Noted: 2025-03-02

## 2025-03-02 PROBLEM — K21.9 GERD (GASTROESOPHAGEAL REFLUX DISEASE): Status: ACTIVE | Noted: 2025-03-02

## 2025-03-02 LAB
ALBUMIN SERPL-MCNC: 3.6 G/DL (ref 3.5–5.2)
ALBUMIN/GLOB SERPL: 1 G/DL
ALP SERPL-CCNC: 89 U/L (ref 39–117)
ALT SERPL W P-5'-P-CCNC: 20 U/L (ref 1–33)
ANION GAP SERPL CALCULATED.3IONS-SCNC: 10 MMOL/L (ref 5–15)
AST SERPL-CCNC: 18 U/L (ref 1–32)
BASOPHILS # BLD MANUAL: 0 10*3/MM3 (ref 0–0.2)
BASOPHILS NFR BLD MANUAL: 0 % (ref 0–1.5)
BILIRUB SERPL-MCNC: 0.3 MG/DL (ref 0–1.2)
BUN SERPL-MCNC: 17 MG/DL (ref 6–20)
BUN/CREAT SERPL: 25.4 (ref 7–25)
CALCIUM SPEC-SCNC: 8.9 MG/DL (ref 8.6–10.5)
CHLORIDE SERPL-SCNC: 102 MMOL/L (ref 98–107)
CO2 SERPL-SCNC: 26 MMOL/L (ref 22–29)
CREAT SERPL-MCNC: 0.67 MG/DL (ref 0.57–1)
CRP SERPL-MCNC: 0.31 MG/DL (ref 0–0.5)
D-LACTATE SERPL-SCNC: 1.5 MMOL/L (ref 0.5–2)
DEPRECATED RDW RBC AUTO: 48.1 FL (ref 37–54)
EGFRCR SERPLBLD CKD-EPI 2021: 102.7 ML/MIN/1.73
EOSINOPHIL # BLD MANUAL: 0 10*3/MM3 (ref 0–0.4)
EOSINOPHIL NFR BLD MANUAL: 0 % (ref 0.3–6.2)
ERYTHROCYTE [DISTWIDTH] IN BLOOD BY AUTOMATED COUNT: 14.1 % (ref 12.3–15.4)
ERYTHROCYTE [SEDIMENTATION RATE] IN BLOOD: 20 MM/HR (ref 0–30)
GLOBULIN UR ELPH-MCNC: 3.5 GM/DL
GLUCOSE SERPL-MCNC: 125 MG/DL (ref 65–99)
HCT VFR BLD AUTO: 49.2 % (ref 34–46.6)
HGB BLD-MCNC: 15.8 G/DL (ref 12–15.9)
LIPASE SERPL-CCNC: 41 U/L (ref 13–60)
LYMPHOCYTES # BLD MANUAL: 0.46 10*3/MM3 (ref 0.7–3.1)
LYMPHOCYTES NFR BLD MANUAL: 7 % (ref 5–12)
MAGNESIUM SERPL-MCNC: 1.9 MG/DL (ref 1.6–2.6)
MCH RBC QN AUTO: 29.6 PG (ref 26.6–33)
MCHC RBC AUTO-ENTMCNC: 32.1 G/DL (ref 31.5–35.7)
MCV RBC AUTO: 92.3 FL (ref 79–97)
MONOCYTES # BLD: 1.62 10*3/MM3 (ref 0.1–0.9)
NEUTROPHILS # BLD AUTO: 21.12 10*3/MM3 (ref 1.7–7)
NEUTROPHILS NFR BLD MANUAL: 87 % (ref 42.7–76)
NEUTS BAND NFR BLD MANUAL: 4 % (ref 0–5)
NEUTS VAC BLD QL SMEAR: ABNORMAL
PHOSPHATE SERPL-MCNC: 4.4 MG/DL (ref 2.5–4.5)
PLAT MORPH BLD: NORMAL
PLATELET # BLD AUTO: 321 10*3/MM3 (ref 140–450)
PMV BLD AUTO: 10.1 FL (ref 6–12)
POTASSIUM SERPL-SCNC: 4 MMOL/L (ref 3.5–5.2)
PROCALCITONIN SERPL-MCNC: 0.07 NG/ML (ref 0–0.25)
PROT SERPL-MCNC: 7.1 G/DL (ref 6–8.5)
RBC # BLD AUTO: 5.33 10*6/MM3 (ref 3.77–5.28)
RBC MORPH BLD: NORMAL
SODIUM SERPL-SCNC: 138 MMOL/L (ref 136–145)
VARIANT LYMPHS NFR BLD MANUAL: 2 % (ref 19.6–45.3)
WBC NRBC COR # BLD AUTO: 23.21 10*3/MM3 (ref 3.4–10.8)

## 2025-03-02 PROCEDURE — 36415 COLL VENOUS BLD VENIPUNCTURE: CPT | Performed by: FAMILY MEDICINE

## 2025-03-02 PROCEDURE — 86140 C-REACTIVE PROTEIN: CPT | Performed by: SURGERY

## 2025-03-02 PROCEDURE — 25010000002 ONDANSETRON PER 1 MG: Performed by: FAMILY MEDICINE

## 2025-03-02 PROCEDURE — 0D9670Z DRAINAGE OF STOMACH WITH DRAINAGE DEVICE, VIA NATURAL OR ARTIFICIAL OPENING: ICD-10-PCS | Performed by: FAMILY MEDICINE

## 2025-03-02 PROCEDURE — 85007 BL SMEAR W/DIFF WBC COUNT: CPT | Performed by: FAMILY MEDICINE

## 2025-03-02 PROCEDURE — 84100 ASSAY OF PHOSPHORUS: CPT | Performed by: FAMILY MEDICINE

## 2025-03-02 PROCEDURE — 84145 PROCALCITONIN (PCT): CPT | Performed by: SURGERY

## 2025-03-02 PROCEDURE — 80053 COMPREHEN METABOLIC PANEL: CPT | Performed by: FAMILY MEDICINE

## 2025-03-02 PROCEDURE — 99221 1ST HOSP IP/OBS SF/LOW 40: CPT | Performed by: SURGERY

## 2025-03-02 PROCEDURE — 83690 ASSAY OF LIPASE: CPT | Performed by: FAMILY MEDICINE

## 2025-03-02 PROCEDURE — 85652 RBC SED RATE AUTOMATED: CPT | Performed by: SURGERY

## 2025-03-02 PROCEDURE — 87040 BLOOD CULTURE FOR BACTERIA: CPT | Performed by: INTERNAL MEDICINE

## 2025-03-02 PROCEDURE — 85025 COMPLETE CBC W/AUTO DIFF WBC: CPT | Performed by: FAMILY MEDICINE

## 2025-03-02 PROCEDURE — 74176 CT ABD & PELVIS W/O CONTRAST: CPT

## 2025-03-02 PROCEDURE — 25010000002 HYDROMORPHONE PER 4 MG: Performed by: FAMILY MEDICINE

## 2025-03-02 PROCEDURE — 25810000003 SODIUM CHLORIDE 0.9 % SOLUTION: Performed by: FAMILY MEDICINE

## 2025-03-02 PROCEDURE — 74018 RADEX ABDOMEN 1 VIEW: CPT

## 2025-03-02 PROCEDURE — 25510000002 DIATRIZOATE MEGLUMINE & SODIUM PER 1 ML: Performed by: SURGERY

## 2025-03-02 PROCEDURE — 83605 ASSAY OF LACTIC ACID: CPT | Performed by: INTERNAL MEDICINE

## 2025-03-02 PROCEDURE — 25010000002 DIPHENHYDRAMINE PER 50 MG: Performed by: FAMILY MEDICINE

## 2025-03-02 PROCEDURE — 25010000002 PIPERACILLIN SOD-TAZOBACTAM PER 1 G: Performed by: INTERNAL MEDICINE

## 2025-03-02 PROCEDURE — 83735 ASSAY OF MAGNESIUM: CPT | Performed by: FAMILY MEDICINE

## 2025-03-02 RX ORDER — PANTOPRAZOLE SODIUM 40 MG/10ML
40 INJECTION, POWDER, LYOPHILIZED, FOR SOLUTION INTRAVENOUS
Status: DISCONTINUED | OUTPATIENT
Start: 2025-03-02 | End: 2025-03-03

## 2025-03-02 RX ORDER — DIATRIZOATE MEGLUMINE AND DIATRIZOATE SODIUM 660; 100 MG/ML; MG/ML
45 SOLUTION ORAL; RECTAL ONCE
Status: COMPLETED | OUTPATIENT
Start: 2025-03-02 | End: 2025-03-02

## 2025-03-02 RX ORDER — DIPHENHYDRAMINE HYDROCHLORIDE 50 MG/ML
25 INJECTION INTRAMUSCULAR; INTRAVENOUS ONCE
Status: COMPLETED | OUTPATIENT
Start: 2025-03-02 | End: 2025-03-02

## 2025-03-02 RX ADMIN — ACETAMINOPHEN 650 MG: 325 TABLET ORAL at 16:18

## 2025-03-02 RX ADMIN — HYDROMORPHONE HYDROCHLORIDE 0.5 MG: 1 INJECTION, SOLUTION INTRAMUSCULAR; INTRAVENOUS; SUBCUTANEOUS at 06:15

## 2025-03-02 RX ADMIN — HYDROMORPHONE HYDROCHLORIDE 0.5 MG: 1 INJECTION, SOLUTION INTRAMUSCULAR; INTRAVENOUS; SUBCUTANEOUS at 00:53

## 2025-03-02 RX ADMIN — DIATRIZOATE MEGLUMINE AND DIATRIZOATE SODIUM 45 ML: 660; 100 SOLUTION ORAL; RECTAL at 09:54

## 2025-03-02 RX ADMIN — DIPHENHYDRAMINE HYDROCHLORIDE 25 MG: 50 INJECTION INTRAMUSCULAR; INTRAVENOUS at 01:42

## 2025-03-02 RX ADMIN — ONDANSETRON 4 MG: 2 INJECTION INTRAMUSCULAR; INTRAVENOUS at 06:14

## 2025-03-02 RX ADMIN — SODIUM CHLORIDE 100 ML/HR: 9 INJECTION, SOLUTION INTRAVENOUS at 09:54

## 2025-03-02 RX ADMIN — Medication 10 ML: at 21:46

## 2025-03-02 RX ADMIN — PIPERACILLIN AND TAZOBACTAM 3.38 G: 3; .375 INJECTION, POWDER, FOR SOLUTION INTRAVENOUS; PARENTERAL at 21:44

## 2025-03-02 RX ADMIN — PANTOPRAZOLE SODIUM 40 MG: 40 INJECTION, POWDER, FOR SOLUTION INTRAVENOUS at 11:46

## 2025-03-02 RX ADMIN — PIPERACILLIN AND TAZOBACTAM 3.38 G: 3; .375 INJECTION, POWDER, FOR SOLUTION INTRAVENOUS; PARENTERAL at 16:18

## 2025-03-02 NOTE — NURSING NOTE
Pt home meds could not be verified as pt does not know which meds she takes at home. Pt states that she does not take vit C, Buspar, and Norco which I have discontinued from the list. Will continue to monitor.

## 2025-03-02 NOTE — PLAN OF CARE
Goal Outcome Evaluation:              Outcome Evaluation: ALOX4. NC1.5l up at minh. NG tube to left nare.

## 2025-03-02 NOTE — PROGRESS NOTES
"    [facility] Medicine Services  INPATIENT PROGRESS NOTE    Patient Name: Katlyn Burger  Date of Admission: 3/1/2025  Today's Date: 03/02/25  Length of Stay: 1  Primary Care Physician: Lizett Ruffin MD    Subjective   Chief Complaint: f/u n/v, possible sbo    HPI   Seen resting in bed.  NG tube in place.  Only small rim of bilious fluid in suction container.  Patient states she has been belching a lot this morning but has not vomited.  She denies chest pain or shortness of breath.  No fevers noted overnight.        Review of Systems   All pertinent negatives and positives are as above. All other systems have been reviewed and are negative unless otherwise stated.     Objective    Temp:  [96.8 °F (36 °C)-98 °F (36.7 °C)] 97.7 °F (36.5 °C)  Heart Rate:  [64-98] 98  Resp:  [16-20] 16  BP: (128-157)/() 128/75  Physical Exam  GEN: Awake, alert, interactive, in NAD  HEENT:  PERRLA, EOMI, anicteric, +NGT  Lungs: CTAB, no wheezing/rales/rhonchi  Heart: RRR, +S1/s2, no rub  ABD: slight distension, soft, generalized tenderness w/o rebound, +BS  Extremities: atraumatic, no cyanosis, no edema  Skin: no rashes or lesions  Neuro: AAOx3, no focal deficits  Psych: normal mood & affect        Results Review:  I have reviewed the labs, radiology results, and diagnostic studies.    Laboratory Data:   Results from last 7 days   Lab Units 03/02/25  0525 03/01/25  1658   WBC 10*3/mm3 23.21* 11.79*   HEMOGLOBIN g/dL 15.8 15.5   HEMATOCRIT % 49.2* 48.0*   PLATELETS 10*3/mm3 321 341        Results from last 7 days   Lab Units 03/02/25  0525 03/01/25  1658   SODIUM mmol/L 138 140   POTASSIUM mmol/L 4.0 4.1   CHLORIDE mmol/L 102 100   CO2 mmol/L 26.0 26.0   BUN mg/dL 17 14   CREATININE mg/dL 0.67 0.65   CALCIUM mg/dL 8.9 9.9   BILIRUBIN mg/dL 0.3 0.3   ALK PHOS U/L 89 94   ALT (SGPT) U/L 20 25   AST (SGOT) U/L 18 23   GLUCOSE mg/dL 125* 107*       Culture Data:   No results found for: \"BLOODCX\", " "\"URINECX\", \"WOUNDCX\", \"MRSACX\", \"RESPCX\", \"STOOLCX\"    Radiology Data:   Imaging Results (Last 24 Hours)       Procedure Component Value Units Date/Time    XR Abdomen KUB [[id number]] Collected: 03/02/25 0339     Updated: 03/02/25 0342    Narrative:      EXAM/TECHNIQUE: XR ABDOMEN KUB-     INDICATION: NG tube insertion; K56.609-Unspecified intestinal  obstruction, unspecified as to partial versus complete obstruction;  R11.2-Nausea with vomiting, unspecified; K59.00-Constipation,  unspecified     COMPARISON: CT 3/1/2025       Impression:      Findings/impression:     Enteric tube tip is in the proximal to mid stomach.     This report was signed and finalized on 3/2/2025 3:39 AM by Dr. Yovany Madison MD.       CT Abdomen Pelvis With Contrast [[id number]] Collected: 03/01/25 1737     Updated: 03/01/25 1751    Narrative:      EXAMINATION: CT ABDOMEN PELVIS W CONTRAST-  3/1/2025 5:37 PM     HISTORY: Epigastric and RUQ abdominal pain, sudden onset at noon today  after eating     COMPARISON: 10/25/2022     DLP: 820.02 mGy.cm     In order to have a CT radiation dose as low as reasonably achievable,  Automated Exposure Control was utilized for adjustment of the mA and/or  KV according to patient size.     TECHNIQUE: Following the intravenous administration of contrast, helical  CT tomographic images of the abdomen and pelvis were acquired. Coronal  and sagittal reformatted images were also provided for review. No oral  contrast.     FINDINGS:  I suspect that there is a breast implant at the lower aspect of the LEFT  breast which is partially included on this examination. I don't see  acute findings in the lower thorax. The heart is not enlarged. Small  hiatal hernia.     Liver, gallbladder, pancreas, spleen, adrenal glands, bilateral kidneys,  and bilateral ureters are without acute findings.     No free intraperitoneal air. No retroperitoneal mass, adenopathy, or  hemorrhage.     Stool noted in the colon. Findings " consistent with mild fecal stasis.  Previous appendectomy changes noted. Some high density material in the  stomach may represent ingested gastric contents but are nonspecific.  There are loops of distended small bowel in the upper abdomen. Air-fluid  levels in distended loops of small bowel in the upper abdomen. Distal  small bowel is largely decompressed. I am suspicious that there may be a  transition point (series 3, image 15) in the upper abdomen at midline  from mildly distended to nondistended loops of small bowel and a small  bowel obstruction should be considered.     No free fluid or free air in the abdomen. Atherosclerotic vascular  disease in the aorta. No aneurysmal dilation.     Surrounding soft tissue structures are without acute findings. Osseous  structures are also without acute findings. No pelvic mass or  adenopathy. Urinary bladder is decompressed. Incidental note of chronic  tear of the LEFT rectus femoris, proximally. Degenerative changes in the  spine with grade 1 anterolisthesis of L4 on L5, likely degenerative.          Impression:         1.  I am suspicious that there are early changes of small bowel  obstruction with transition point in the RIGHT mid to upper abdomen.  Decompression of distal small bowel noted. Mild distention of proximal  small bowel loops with maximum diameter of approximately 2.5 cm.     2.  Moderate fecal stasis.      This report was signed and finalized on 3/1/2025 5:48 PM by Dr. Nasim Durán MD.               I have reviewed the patient's current medications.     Assessment/Plan   Assessment  Active Hospital Problems    Diagnosis     **SBO (small bowel obstruction)     GERD (gastroesophageal reflux disease)     Leukocytosis     Anxiety        Treatment Plan  #1 ? SBO -patient presenting with nausea and vomiting concern initial CT for possible SBO.  This was a noncontrast study.  There was significant constipation on that study and an enema was attempted in the  ER without success.  NG tube was placed and patient was admitted.  Currently getting IV fluids and n.p.o. with NGT in place. Seen by general surgery with plan for repeat ct with gastrografin po.    #2 leukocytosis - wbc has jumped from 11 to 23k? Unclear etiology. No fevers. Pt denies recent diarrhea. Possibly reactive. Check crp, esr, lactic acid. F/u ct results    #3 GERD -IV PPI    #4 anxiety -resume oral meds when able    #5 constipation -received enema last evening.  Await repeat CT scan.  Will likely need to start bowel regimen at some point pending results.  Need to rule out obstruction first.      Medical Decision Making  Number and Complexity of problems: 2-3 acute, multiple chronic  Differential Diagnosis: As above    Conditions and Status        New to me.  Interactive and pleasant.  Does not appear toxic.     MDM Data  External documents reviewed: none  Cardiac tracing (EKG, telemetry) interpretation: none  Radiology interpretation: ct report reviewed  Labs reviewed: as above  Any tests that were considered but not ordered: none     Decision rules/scores evaluated (example [id number], Wells, etc): none     Discussed with: patient, nursing, Dr. Garza     Care Planning  Shared decision making: Patient apprised of current labs, vitals, imaging and treatment plan.  They are agreeable with proceeding with plans as discussed.    Code status and discussions: full code    Disposition  Social Determinants of Health that impact treatment or disposition: none  I expect the patient to be discharged to home when improved, likely in a few days         Electronically signed by Shay Navarro DO, 03/02/25, 10:59 CST.

## 2025-03-02 NOTE — H&P
[facility] Medicine Services  HISTORY AND PHYSICAL    Date of Admission: 3/1/2025  Primary Care Physician: Lizett Ruffin MD    Subjective   Primary Historian: Patient    Chief Complaint: Abdominal pain, nausea and vomiting    History of Present Illness  54 year old female that presents to the ER with complaints of severe abdominal pain, nausea and vomiting since 1 PM. Symptoms persisted and she decided to come to the ER for evaluation. CT abd/pelvis report: suspicious that there are early changes of small bowel obstruction with transition point in the RIGHT mid to upper abdomen. Decompression of distal small bowel noted. Mild distention of proximal small bowel loops with maximum diameter of approximately 2.5 cm.     Review of Systems   Otherwise complete ROS reviewed and negative except as mentioned in the HPI.    Past Medical History:   Past Medical History:   Diagnosis Date    Anxiety     Arthritis     Depression      Past Surgical History:  Past Surgical History:   Procedure Laterality Date    APPENDECTOMY      AUGMENTATION MAMMAPLASTY Left 2007    pt had a lift and implant on the left side only    BREAST SURGERY Left     lifted and implant    KNEE SURGERY      SINUS SURGERY      TONSILLECTOMY       Social History:  reports that she has never smoked. She does not have any smokeless tobacco history on file. She reports that she does not currently use alcohol. She reports that she does not use drugs.    Family History: family history includes Anuerysm in her mother; Breast cancer in her paternal aunt and paternal aunt; Diabetes in her father.       Allergies:  No Known Allergies    Medications:  Prior to Admission medications    Medication Sig Start Date End Date Taking? Authorizing Provider   Ascorbic Acid (VITAMIN C PO) Take  by mouth Daily.    Emergency, Nurse [facility]   busPIRone (BUSPAR) 15 MG tablet Take 1 tablet by mouth 2 (Two) Times a Day.    Emergency, Nurse [facility]  "  cetirizine (zyrTEC) 10 MG tablet Take 1 tablet by mouth Daily.    Provider, MD Sarai   ciprofloxacin (CIPRO) 250 MG tablet Take 1 tablet by mouth 2 (Two) Times a Day. 10/17/16   Koko Schmitz MD   desvenlafaxine (PRISTIQ) 50 MG 24 hr tablet Take 1 tablet by mouth Daily.    Provider, MD Sarai   HYDROcodone-acetaminophen (NORCO) 5-325 MG per tablet Take 1 tablet by mouth Every 6 (Six) Hours As Needed for Moderate Pain. 10/25/23   Shauna Olsen APRN   meloxicam (MOBIC) 15 MG tablet Take 15 mg by mouth Daily.    Emergency, Nurse [facility], RN   Multiple Vitamins-Minerals (VITEYES AREDS ADVANCED PO) Take  by mouth.    ProviderSarai MD   Naproxen Sodium (ALEVE PO) Take 500 mg by mouth 2 (Two) Times a Day.    ProviderSarai MD   ondansetron ODT (ZOFRAN-ODT) 4 MG disintegrating tablet Place 1 tablet on the tongue Every 6 (Six) Hours As Needed for Nausea. 10/25/22   Aurora Wynn APRN   pantoprazole (PROTONIX) 40 MG EC tablet Take 1 tablet by mouth Daily. 10/25/22   Aurora Wynn APRN   venlafaxine (EFFEXOR) 75 MG tablet Take 75 mg by mouth Daily.    Emergency, Nurse [facility], RN   VITAMIN D, CHOLECALCIFEROL, PO Take  by mouth Daily.    Emergency, Nurse [facility], RN     I have utilized all available immediate resources to obtain, update, or review the patient's current medications (including all prescriptions, over-the-counter products, herbals, cannabis/cannabidiol products, and vitamin/mineral/dietary (nutritional) supplements).    Objective     Vital Signs: /79   Pulse 84   Temp 96.8 °F (36 °C) (Temporal)   Resp 20   Ht 172.7 cm (68\")   Wt 93 kg (205 lb)   LMP 09/26/2016   SpO2 92%   BMI 31.17 kg/m²   Physical Exam  Vitals reviewed.   Constitutional:       General: She is not in acute distress.     Appearance: She is well-developed. She is not toxic-appearing.   HENT:      Head: Normocephalic and atraumatic.      Right Ear: External ear normal.      Left Ear: " External ear normal.      Mouth/Throat:      Mouth: Mucous membranes are dry.      Pharynx: Oropharynx is clear.   Eyes:      General:         Right eye: No discharge.         Left eye: No discharge.      Extraocular Movements: Extraocular movements intact.      Conjunctiva/sclera: Conjunctivae normal.      Pupils: Pupils are equal, round, and reactive to light.   Neck:      Vascular: No JVD.   Cardiovascular:      Rate and Rhythm: Normal rate and regular rhythm.      Pulses: Normal pulses.      Heart sounds: Normal heart sounds. No murmur heard.     No friction rub. No gallop.   Pulmonary:      Effort: Pulmonary effort is normal. No respiratory distress.      Breath sounds: No stridor. No wheezing, rhonchi or rales.   Chest:      Chest wall: No tenderness.   Abdominal:      General: There is no distension.      Palpations: Abdomen is soft.      Tenderness: There is abdominal tenderness. There is no guarding or rebound.      Hernia: No hernia is present.   Musculoskeletal:         General: No swelling, tenderness or deformity. Normal range of motion.      Cervical back: Normal range of motion and neck supple. No rigidity or tenderness. No muscular tenderness.      Right lower leg: No edema.      Left lower leg: No edema.   Skin:     General: Skin is warm and dry.      Findings: No erythema or rash.   Neurological:      General: No focal deficit present.      Mental Status: She is alert and oriented to person, place, and time.      Cranial Nerves: No cranial nerve deficit.      Sensory: No sensory deficit.      Motor: No weakness or abnormal muscle tone.      Deep Tendon Reflexes: Reflexes normal.   Psychiatric:         Mood and Affect: Mood normal.         Behavior: Behavior normal.              Results Reviewed:  Lab Results (last 24 hours)       Procedure Component Value Units Date/Time    Urinalysis With Culture If Indicated - Urine, Clean Catch [[id number]]  (Abnormal) Collected: 03/01/25 1728    Specimen: Urine,  Clean Catch Updated: 03/01/25 1751     Color, UA Yellow     Appearance, UA Clear     pH, UA 8.5     Specific Gravity, UA 1.023     Glucose, UA Negative     Ketones, UA Trace     Bilirubin, UA Negative     Blood, UA Negative     Protein, UA Negative     Leuk Esterase, UA Small (1+)     Nitrite, UA Negative     Urobilinogen, UA 1.0 E.U./dL    Narrative:      In absence of clinical symptoms, the presence of pyuria, bacteria, and/or nitrites on the urinalysis result does not correlate with infection.    Urinalysis, Microscopic Only - Urine, Clean Catch [[id number]]  (Abnormal) Collected: 03/01/25 1728    Specimen: Urine, Clean Catch Updated: 03/01/25 1751     RBC, UA 0-2 /HPF      WBC, UA 3-5 /HPF      Comment: Urine culture not indicated.        Bacteria, UA None Seen /HPF      Squamous Epithelial Cells, UA 0-2 /HPF      Hyaline Casts, UA None Seen /LPF      Methodology Manual Light Microscopy    Comprehensive Metabolic Panel [[id number]]  (Abnormal) Collected: 03/01/25 1658    Specimen: Blood Updated: 03/01/25 1732     Glucose 107 mg/dL      BUN 14 mg/dL      Creatinine 0.65 mg/dL      Sodium 140 mmol/L      Potassium 4.1 mmol/L      Chloride 100 mmol/L      CO2 26.0 mmol/L      Calcium 9.9 mg/dL      Total Protein 8.3 g/dL      Albumin 4.3 g/dL      ALT (SGPT) 25 U/L      AST (SGOT) 23 U/L      Alkaline Phosphatase 94 U/L      Total Bilirubin 0.3 mg/dL      Globulin 4.0 gm/dL      A/G Ratio 1.1 g/dL      BUN/Creatinine Ratio 21.5     Anion Gap 14.0 mmol/L      eGFR 103.5 mL/min/1.73     Narrative:      GFR Categories in Chronic Kidney Disease (CKD)      GFR Category          GFR (mL/min/1.73)    Interpretation  G1                     90 or greater         Normal or high (1)  G2                      60-89                Mild decrease (1)  G3a                   45-59                Mild to moderate decrease  G3b                   30-44                Moderate to severe decrease  G4                    15-29                 Severe decrease  G5                    14 or less           Kidney failure          (1)In the absence of evidence of kidney disease, neither GFR category G1 or G2 fulfill the criteria for CKD.    eGFR calculation 2021 CKD-EPI creatinine equation, which does not include race as a factor    Lipase [[id number]]  (Abnormal) Collected: 03/01/25 1658    Specimen: Blood Updated: 03/01/25 1727     Lipase 73 U/L     CBC & Differential [[id number]]  (Abnormal) Collected: 03/01/25 1658    Specimen: Blood Updated: 03/01/25 1715    Narrative:      The following orders were created for panel order CBC & Differential.  Procedure                               Abnormality         Status                     ---------                               -----------         ------                     CBC Auto Differential[[id number]]        Abnormal            Final result                 Please view results for these tests on the individual orders.    CBC Auto Differential [[id number]]  (Abnormal) Collected: 03/01/25 1658    Specimen: Blood Updated: 03/01/25 1715     WBC 11.79 10*3/mm3      RBC 5.32 10*6/mm3      Hemoglobin 15.5 g/dL      Hematocrit 48.0 %      MCV 90.2 fL      MCH 29.1 pg      MCHC 32.3 g/dL      RDW 14.2 %      RDW-SD 47.4 fl      MPV 10.3 fL      Platelets 341 10*3/mm3      Neutrophil % 68.1 %      Lymphocyte % 22.1 %      Monocyte % 7.9 %      Eosinophil % 1.0 %      Basophil % 0.4 %      Immature Grans % 0.5 %      Neutrophils, Absolute 8.03 10*3/mm3      Lymphocytes, Absolute 2.60 10*3/mm3      Monocytes, Absolute 0.93 10*3/mm3      Eosinophils, Absolute 0.12 10*3/mm3      Basophils, Absolute 0.05 10*3/mm3      Immature Grans, Absolute 0.06 10*3/mm3      nRBC 0.0 /100 WBC           Imaging Results (Last 24 Hours)       Procedure Component Value Units Date/Time    CT Abdomen Pelvis With Contrast [[id number]] Collected: 03/01/25 1737     Updated: 03/01/25 1751    Narrative:      EXAMINATION: CT ABDOMEN PELVIS W CONTRAST-   3/1/2025 5:37 PM     HISTORY: Epigastric and RUQ abdominal pain, sudden onset at noon today  after eating     COMPARISON: 10/25/2022     DLP: 820.02 mGy.cm     In order to have a CT radiation dose as low as reasonably achievable,  Automated Exposure Control was utilized for adjustment of the mA and/or  KV according to patient size.     TECHNIQUE: Following the intravenous administration of contrast, helical  CT tomographic images of the abdomen and pelvis were acquired. Coronal  and sagittal reformatted images were also provided for review. No oral  contrast.     FINDINGS:  I suspect that there is a breast implant at the lower aspect of the LEFT  breast which is partially included on this examination. I don't see  acute findings in the lower thorax. The heart is not enlarged. Small  hiatal hernia.     Liver, gallbladder, pancreas, spleen, adrenal glands, bilateral kidneys,  and bilateral ureters are without acute findings.     No free intraperitoneal air. No retroperitoneal mass, adenopathy, or  hemorrhage.     Stool noted in the colon. Findings consistent with mild fecal stasis.  Previous appendectomy changes noted. Some high density material in the  stomach may represent ingested gastric contents but are nonspecific.  There are loops of distended small bowel in the upper abdomen. Air-fluid  levels in distended loops of small bowel in the upper abdomen. Distal  small bowel is largely decompressed. I am suspicious that there may be a  transition point (series 3, image 15) in the upper abdomen at midline  from mildly distended to nondistended loops of small bowel and a small  bowel obstruction should be considered.     No free fluid or free air in the abdomen. Atherosclerotic vascular  disease in the aorta. No aneurysmal dilation.     Surrounding soft tissue structures are without acute findings. Osseous  structures are also without acute findings. No pelvic mass or  adenopathy. Urinary bladder is decompressed.  Incidental note of chronic  tear of the LEFT rectus femoris, proximally. Degenerative changes in the  spine with grade 1 anterolisthesis of L4 on L5, likely degenerative.          Impression:         1.  I am suspicious that there are early changes of small bowel  obstruction with transition point in the RIGHT mid to upper abdomen.  Decompression of distal small bowel noted. Mild distention of proximal  small bowel loops with maximum diameter of approximately 2.5 cm.     2.  Moderate fecal stasis.      This report was signed and finalized on 3/1/2025 5:48 PM by Dr. Nasim Durán MD.             I have personally reviewed and interpreted the radiology studies and ECG obtained at time of admission.     Assessment / Plan   Assessment:   Active Hospital Problems    Diagnosis     **SBO (small bowel obstruction)      Treatment Plan  The patient will be admitted to my service here at [facility].   Admit to medical floor  Vitals every 4 hours  Diet NPO  IVF  cc/hour  Up add minh    SBO  General surgery in AM  Pain control  Zofran 4 mg IVP q6h prn  If recurrent emesis will need NGT    Lipase slight elevation  Repeat in AM    DVT prophylaxis > SCD     Care Planning  Shared decision making: Patient  Code status and discussions: Full code    Disposition  Social Determinants of Health that impact treatment or disposition: none  Estimated length of stay is to be determined.     I confirmed that the patient's advanced care plan is present, code status is documented, and a surrogate decision maker is listed in the patient's medical record.     The patient's surrogate decision maker is family, see records.     The patient was seen and examined by me on 3/01/2025 at 2001.    Electronically signed by Micheal Valentin MD, 03/01/25, 20:58 CST.

## 2025-03-02 NOTE — PROGRESS NOTES
"Pharmacy Review  Antimicrobial Stewardship     Current Antimicrobials:   piperacillin-tazobactam (ZOSYN) 3.375 g IVPB in 100 mL NS MBP (CD)    Indication(s): intra-abdominal infection  Days of Therapy: 1 of 3    Is the therapy & duration appropriate based on evidence-based guidelines?: appropriate  Changes Recommended: No    Assessment/Action: Received a \"Pharmacy to Dose\" consult for the initiation of Zosyn for the above indication. Reviewed the patient's age, CrCl, BMI, and other labs. Initiated Zosyn 3.375 g ONCE IVPB for today @ 1600 then Zosyn 3.375 g IVPB Q8H (per extended infusion protocol). No other action required at this time per pharmacy based on available information and current clinical status. Will continue routine follow-up evaluation and make any necessary adjustments.    Neli Ferris, PharmD  03/02/25 15:15 CST      Subjective:   Diagnosis:   1. SBO (small bowel obstruction)    2. Nausea and vomiting, unspecified vomiting type    3. Constipation, unspecified constipation type         Patient Active Problem List   Diagnosis    Urine frequency    Elevated BP    Cystitis    SBO (small bowel obstruction)    GERD (gastroesophageal reflux disease)    Leukocytosis    Anxiety       Allergies:  Allergies as of 03/01/2025    (No Known Allergies)       Past Medical History:   Diagnosis Date    Anxiety     Arthritis     Depression        Objective:  Current Antimicrobial Medications:   Anti-Infectives (From admission, onward)      Ordered     Dose/Rate Route Frequency Start Stop    03/02/25 1514  piperacillin-tazobactam (ZOSYN) 3.375 g IVPB in 100 mL NS MBP (CD)        Ordering Provider: Shay Navarro DO    3.375 g  over 4 Hours Intravenous Every 8 Hours 03/02/25 2200 03/05/25 2159    03/02/25 1514  piperacillin-tazobactam (ZOSYN) 3.375 g IVPB in 100 mL NS MBP (CD)        Ordering Provider: Shay Navarro DO    3.375 g  over 30 Minutes Intravenous Once 03/02/25 1600      03/02/25 1510  Pharmacy To " Dose: Piperacillin-tazobactam (Zosyn)        Ordering Provider: Shay Navarro, DO     Not Applicable Continuous PRN 03/02/25 1509 03/05/25 1508            Culture Results:  Lab Results   Component Value Date    URINECX >100,000 CFU/mL Escherichia coli (A) 01/10/2017    URINECX 70,000-80,000 CFU/mL Escherichia coli (A) 10/15/2016       Microbiology Results (last 10 days)       ** No results found for the last 240 hours. **            Lab Results   Component Value Date    WBC 23.21 (H) 03/02/2025    WBC 11.79 (H) 03/01/2025    WBC 7.1 11/22/2024           Lab 03/02/25  0525   PROCALCITONIN 0.07

## 2025-03-02 NOTE — ED NOTES
"Nursing report ED to floor  Katlyn Burger  54 y.o.  female    HPI:   Chief Complaint   Patient presents with    Abdominal Pain       Admitting doctor:   Micheal Valentin MD    Consulting provider(s):  Consults       No orders found from 1/31/2025 to 3/2/2025.             Admitting diagnosis:   The primary encounter diagnosis was SBO (small bowel obstruction). Diagnoses of Nausea and vomiting, unspecified vomiting type and Constipation, unspecified constipation type were also pertinent to this visit.    Code status:   Current Code Status       Date Active Code Status Order ID Comments User Context       Not on file            Allergies:   Patient has no known allergies.    Intake and Output  No intake or output data in the 24 hours ending 03/01/25 2057    Weight:       03/01/25  1613   Weight: 93 kg (205 lb)       Most recent vitals:   Vitals:    03/01/25 1613 03/01/25 1701   BP: 143/97    BP Location: Right arm    Patient Position: Sitting    Pulse: 75 84   Resp: 20    Temp: 96.8 °F (36 °C)    TempSrc: Temporal    SpO2: 100% 92%   Weight: 93 kg (205 lb)    Height: 172.7 cm (68\")      Oxygen Therapy: .    Active LDAs/IV Access:   Lines, Drains & Airways       Active LDAs       Name Placement date Placement time Site Days    Peripheral IV 03/01/25 1659 Left Antecubital 03/01/25 1659  Antecubital  less than 1                    Labs (abnormal labs have a star):   Labs Reviewed   COMPREHENSIVE METABOLIC PANEL - Abnormal; Notable for the following components:       Result Value    Glucose 107 (*)     All other components within normal limits    Narrative:     GFR Categories in Chronic Kidney Disease (CKD)      GFR Category          GFR (mL/min/1.73)    Interpretation  G1                     90 or greater         Normal or high (1)  G2                      60-89                Mild decrease (1)  G3a                   45-59                Mild to moderate decrease  G3b                   30-44                " Moderate to severe decrease  G4                    15-29                Severe decrease  G5                    14 or less           Kidney failure          (1)In the absence of evidence of kidney disease, neither GFR category G1 or G2 fulfill the criteria for CKD.    eGFR calculation 2021 CKD-EPI creatinine equation, which does not include race as a factor   LIPASE - Abnormal; Notable for the following components:    Lipase 73 (*)     All other components within normal limits   URINALYSIS W/ CULTURE IF INDICATED - Abnormal; Notable for the following components:    pH, UA 8.5 (*)     Ketones, UA Trace (*)     Leuk Esterase, UA Small (1+) (*)     All other components within normal limits    Narrative:     In absence of clinical symptoms, the presence of pyuria, bacteria, and/or nitrites on the urinalysis result does not correlate with infection.   CBC WITH AUTO DIFFERENTIAL - Abnormal; Notable for the following components:    WBC 11.79 (*)     RBC 5.32 (*)     Hematocrit 48.0 (*)     Neutrophils, Absolute 8.03 (*)     Monocytes, Absolute 0.93 (*)     Immature Grans, Absolute 0.06 (*)     All other components within normal limits   URINALYSIS, MICROSCOPIC ONLY - Abnormal; Notable for the following components:    WBC, UA 3-5 (*)     All other components within normal limits   CBC AND DIFFERENTIAL    Narrative:     The following orders were created for panel order CBC & Differential.  Procedure                               Abnormality         Status                     ---------                               -----------         ------                     CBC Auto Differential[[id number]]        Abnormal            Final result                 Please view results for these tests on the individual orders.       Meds given in ED:   Medications   sodium chloride 0.9 % flush 10 mL (has no administration in time range)   ondansetron (ZOFRAN) injection 4 mg (4 mg Intravenous Given 3/1/25 2361)   iopamidol (ISOVUE-300) 61 %  injection 100 mL (100 mL Intravenous Given 3/1/25 1724)   sodium chloride 0.9 % bolus 1,000 mL (1,000 mL Intravenous New Bag 3/1/25 1930)   HYDROmorphone (DILAUDID) injection 0.5 mg (0.5 mg Intravenous Given 3/1/25 1930)           NIH Stroke Scale:       Isolation/Infection(s):  No active isolations   No active infections     COVID Testing  Collected .  Resulted .    Nursing report ED to floor:  Mental status: .  Ambulatory status: .  Precautions: .    ED nurse phone extentsion- ..

## 2025-03-02 NOTE — PLAN OF CARE
Goal Outcome Evaluation:  Plan of Care Reviewed With: patient           Outcome Evaluation: Patient denies pain and n/v. Tylenol given once for a headache. IVF and IV abx given. NG dc'd and clear liquids ordered. Safety maintained.

## 2025-03-02 NOTE — CONSULTS
GENERAL SURGERY CONSULTATION NOTE    Patient Name:  Katlyn Burger  YOB: 1970  MRN: [id number]    Patient Care Team:  Lieztt Ruffin MD as PCP - General  Lizett Ruffin MD as PCP - Family Medicine    Date of Consultation: 03/02/25    Consulting Physician: Shay Navarro DO    Reason for Consult: Abdominal pain, rule out small bowel obstruction    History of Present Illness  Katlyn Burger is a 54 y.o. female that I am seeing in consultation for possible small bowel obstruction.  The patient presented to the emergency department yesterday after eating a cookie and  resulting in abdominal pain and 1 episode of emesis.  She presented to the emergency department where she was found to have a white count of 11,000 and a lipase of 73 but otherwise normal labs.  A CT of the abdomen pelvis with IV contrast only showed a possible small bowel obstruction.  I was contacted for further recommendations.  I personally reviewed the CT scan at the time which did not appear to represent a small bowel obstruction, but she did have a significant amount of colonic stool burden, therefore I recommended repeating a CT scan with Gastrografin and giving her an enema.  Neither of this was performed, and hospital medicine was consulted for admission after NG tube was placed.  Oertli, the patient is complaining of some mild abdominal pain but no nausea or vomiting.  I reordered a CT of the abdomen pelvis with Gastrografin.    Allergies   No Known Allergies    Medications  desvenlafaxine, 50 mg, Oral, Daily  pantoprazole, 40 mg, Intravenous, Q AM  sodium chloride, 10 mL, Intravenous, Q12H      sodium chloride, 100 mL/hr, Last Rate: 100 mL/hr (03/02/25 0954)      No current facility-administered medications on file prior to encounter.     Current Outpatient Medications on File Prior to Encounter   Medication Sig    cetirizine (zyrTEC) 10 MG tablet Take 1 tablet by mouth Daily.    desvenlafaxine (PRISTIQ) 100 MG 24 hr  tablet Take 1 tablet by mouth Daily.    Multiple Vitamins-Minerals (VITEYES AREDS ADVANCED PO) Take 1 tablet by mouth Daily.    [DISCONTINUED] Secukinumab 300 MG/2ML solution auto-injector Inject 300 mg under the skin into the appropriate area as directed Every 28 (Twenty-Eight) Days.    [DISCONTINUED] HYDROcodone-acetaminophen (NORCO) 5-325 MG per tablet Take 1 tablet by mouth Every 6 (Six) Hours As Needed for Moderate Pain. (Patient not taking: Reported on 3/2/2025)    [DISCONTINUED] meloxicam (MOBIC) 15 MG tablet Take 15 mg by mouth Daily. (Patient not taking: Reported on 3/2/2025)    [DISCONTINUED] Naproxen Sodium (ALEVE PO) Take 500 mg by mouth 2 (Two) Times a Day. (Patient not taking: Reported on 3/2/2025)    [DISCONTINUED] ondansetron ODT (ZOFRAN-ODT) 4 MG disintegrating tablet Place 1 tablet on the tongue Every 6 (Six) Hours As Needed for Nausea. (Patient not taking: Reported on 3/2/2025)    [DISCONTINUED] pantoprazole (PROTONIX) 40 MG EC tablet Take 1 tablet by mouth Daily. (Patient not taking: Reported on 3/2/2025)    [DISCONTINUED] venlafaxine (EFFEXOR) 75 MG tablet Take 75 mg by mouth Daily. (Patient not taking: Reported on 3/2/2025)    [DISCONTINUED] VITAMIN D, CHOLECALCIFEROL, PO Take  by mouth Daily. (Patient not taking: Reported on 3/2/2025)       History  Past Medical History:   Diagnosis Date    Anxiety     Arthritis     Depression    ,   Past Surgical History:   Procedure Laterality Date    APPENDECTOMY      AUGMENTATION MAMMAPLASTY Left 2007    pt had a lift and implant on the left side only    BREAST SURGERY Left     lifted and implant    KNEE SURGERY      SINUS SURGERY      TONSILLECTOMY       Family History   Problem Relation Age of Onset    Anuerysm Mother     Diabetes Father     Breast cancer Paternal Aunt     Breast cancer Paternal Aunt      Social History     Tobacco Use    Smoking status: Never   Vaping Use    Vaping status: Never Used   Substance Use Topics    Alcohol use: Not Currently     Drug use: No   Pt lives in [facility]     Current Facility-Administered Medications:     acetaminophen (TYLENOL) tablet 650 mg, 650 mg, Oral, Q4H PRN **OR** acetaminophen (TYLENOL) 160 MG/5ML oral solution 650 mg, 650 mg, Oral, Q4H PRN **OR** acetaminophen (TYLENOL) suppository 650 mg, 650 mg, Rectal, Q4H PRN, Micheal Valentin MD    Calcium Replacement - Follow Nurse / BPA Driven Protocol, , Not Applicable, PRNBarbie Mariano Ariel, MD    desvenlafaxine (PRISTIQ) 24 hr tablet 50 mg, 50 mg, Oral, Daily, Micheal Valentin MD    HYDROmorphone (DILAUDID) injection 0.5 mg, 0.5 mg, Intravenous, Q2H PRN, 0.5 mg at 03/02/25 0615 **AND** naloxone (NARCAN) injection 0.4 mg, 0.4 mg, Intravenous, Q5 Min PRN, Micheal Valentin MD    Magnesium Standard Dose Replacement - Follow Nurse / BPA Driven Protocol, , Not Applicable, PRBarbie GUADARRAMA Mariano Ariel, MD    ondansetron (ZOFRAN) injection 4 mg, 4 mg, Intravenous, Q6H PRN, Micheal Valentin MD, 4 mg at 03/02/25 0614    pantoprazole (PROTONIX) injection 40 mg, 40 mg, Intravenous, Q AM, Shay Navarro DO, 40 mg at 03/02/25 1146    Phosphorus Replacement - Follow Nurse / BPA Driven Protocol, , Not Applicable, PRBarbie GUADARRAMA Mariano Ariel, MD    Potassium Replacement - Follow Nurse / BPA Driven Protocol, , Not Applicable, PRBarbie GUADARRAMA Mariano Ariel, MD    [COMPLETED] Insert Peripheral IV, , , Once **AND** sodium chloride 0.9 % flush 10 mL, 10 mL, Intravenous, PRN, Micheal Valentin MD    sodium chloride 0.9 % flush 10 mL, 10 mL, Intravenous, Q12H, Micheal Valentin MD, 10 mL at 03/01/25 2258    sodium chloride 0.9 % flush 10 mL, 10 mL, Intravenous, PRN, Micheal Valentin MD    sodium chloride 0.9 % infusion 40 mL, 40 mL, Intravenous, PRNBarbie Mariano Ariel, MD    sodium chloride 0.9 % infusion, 100 mL/hr, Intravenous, Continuous, Micheal Valentin MD, Last Rate: 100 mL/hr at 03/02/25 0954, 100 mL/hr at  "03/02/25 0954    Review of Systems  A comprehensive 14 point review of systems was performed and is negative unless otherwise noted.     Vital Signs  /76 (BP Location: Right arm, Patient Position: Lying)   Pulse 92   Temp 98 °F (36.7 °C) (Oral)   Resp 16   Ht 172.7 cm (68\")   Wt 94.1 kg (207 lb 8 oz)   LMP 09/26/2016   SpO2 98%   BMI 31.55 kg/m²   Body mass index is 31.55 kg/m².     Intake/Output Summary (Last 24 hours) at 3/2/2025 1416  Last data filed at 3/2/2025 0953  Gross per 24 hour   Intake 974 ml   Output 200 ml   Net 774 ml       Physical Exam  Constitutional:       Appearance: Normal appearance. She is normal weight.      Comments: Elderly female, in no acute distress. Normal development, obese body habitus. Well nourished   HENT:      Head: Normocephalic and atraumatic.      Right Ear: External ear normal.      Left Ear: External ear normal.      Ears:      Comments: Hearing intact     Nose: Nose normal.      Comments: Nares patent, no septal deviation, no drainage     Mouth/Throat:      Comments: Airway patent, dentition intact, mucus membranes moist  Eyes:      Extraocular Movements: Extraocular movements intact.      Conjunctiva/sclera: Conjunctivae normal.      Pupils: Pupils are equal, round, and reactive to light.      Comments: External eyelids grossly normal, vision intact, no scleral icterus   Neck:      Comments: Trachea midline  Cardiovascular:      Rate and Rhythm: Normal rate.      Comments: Normotensive, no JVD bilaterally  Pulmonary:      Effort: Pulmonary effort is normal.      Breath sounds: Normal breath sounds.      Comments: Normal respiratory rate  Abdominal:      Comments: Obese abdomen, mildly tender diffusely without guarding or peritonitis.  No hernias or masses   Musculoskeletal:         General: Normal range of motion.      Cervical back: Normal range of motion.   Skin:     General: Skin is warm and dry.      Comments: Skin color is consistent with ethnicity " No   Neurological:      General: No focal deficit present.      Mental Status: She is alert and oriented to person, place, and time.   Psychiatric:         Mood and Affect: Mood normal.         Behavior: Behavior normal.         Thought Content: Thought content normal.         Judgment: Judgment normal.      Comments: Patient understands disease process and has decision making capacity.          Results:  Labs:  I personally reviewed all pertinent labs.   Imaging: I personally reviewed all pertinent imaging studies.  CT of the abdomen pelvis with Gastrografin    ASSESSMENT AND PLAN    Active Hospital Problems    Diagnosis     **SBO (small bowel obstruction)     GERD (gastroesophageal reflux disease)     Leukocytosis     Anxiety        Katlyn Burger is a 54 y.o. female with abdominal pain.  Repeat CT with Gastrografin showed increased ascites and a markedly inflamed and thickened long segment of small bowel with narrowing, however contrast migrated into the colon.  These findings are less suspicious for a small bowel obstruction, and possibly enteritis or inflammatory bowel disease.  NG tube has been removed.  Okay to advance diet as the patient can tolerate. Unclear why her white count has increased this morning.  This may be reactionary or possibly gastroenteritis or IBD.  This is less likely a small bowel obstruction due to adhesive disease.  She may need further workup with a CT enterography or a GI consult.  She does not appear to have any indications for any urgent surgical intervention.  General surgery will follow peripherally.    I discussed the patient's findings and my recommendations with the patient and/or family, as well as the nursing staff and primary care team.    Cliff Garza MD, FACS  General Surgery  3/2/2025  14:16 CST    Please note that portions of this note were completed with a voice recognition program.

## 2025-03-03 LAB
ALBUMIN SERPL-MCNC: 3 G/DL (ref 3.5–5.2)
ALBUMIN/GLOB SERPL: 1 G/DL
ALP SERPL-CCNC: 66 U/L (ref 39–117)
ALT SERPL W P-5'-P-CCNC: 15 U/L (ref 1–33)
ANION GAP SERPL CALCULATED.3IONS-SCNC: 9 MMOL/L (ref 5–15)
AST SERPL-CCNC: 14 U/L (ref 1–32)
BASOPHILS # BLD AUTO: 0.05 10*3/MM3 (ref 0–0.2)
BASOPHILS NFR BLD AUTO: 0.5 % (ref 0–1.5)
BILIRUB SERPL-MCNC: 0.5 MG/DL (ref 0–1.2)
BUN SERPL-MCNC: 9 MG/DL (ref 6–20)
BUN/CREAT SERPL: 18.4 (ref 7–25)
CALCIUM SPEC-SCNC: 8.1 MG/DL (ref 8.6–10.5)
CHLORIDE SERPL-SCNC: 107 MMOL/L (ref 98–107)
CO2 SERPL-SCNC: 25 MMOL/L (ref 22–29)
CREAT SERPL-MCNC: 0.49 MG/DL (ref 0.57–1)
DEPRECATED RDW RBC AUTO: 50.7 FL (ref 37–54)
EGFRCR SERPLBLD CKD-EPI 2021: 110.8 ML/MIN/1.73
EOSINOPHIL # BLD AUTO: 0.09 10*3/MM3 (ref 0–0.4)
EOSINOPHIL NFR BLD AUTO: 0.8 % (ref 0.3–6.2)
ERYTHROCYTE [DISTWIDTH] IN BLOOD BY AUTOMATED COUNT: 14.6 % (ref 12.3–15.4)
GLOBULIN UR ELPH-MCNC: 3 GM/DL
GLUCOSE SERPL-MCNC: 93 MG/DL (ref 65–99)
HCT VFR BLD AUTO: 43.7 % (ref 34–46.6)
HGB BLD-MCNC: 13.5 G/DL (ref 12–15.9)
IMM GRANULOCYTES # BLD AUTO: 0.04 10*3/MM3 (ref 0–0.05)
IMM GRANULOCYTES NFR BLD AUTO: 0.4 % (ref 0–0.5)
LYMPHOCYTES # BLD AUTO: 1.86 10*3/MM3 (ref 0.7–3.1)
LYMPHOCYTES NFR BLD AUTO: 17.5 % (ref 19.6–45.3)
MAGNESIUM SERPL-MCNC: 2 MG/DL (ref 1.6–2.6)
MCH RBC QN AUTO: 28.7 PG (ref 26.6–33)
MCHC RBC AUTO-ENTMCNC: 30.9 G/DL (ref 31.5–35.7)
MCV RBC AUTO: 93 FL (ref 79–97)
MONOCYTES # BLD AUTO: 0.97 10*3/MM3 (ref 0.1–0.9)
MONOCYTES NFR BLD AUTO: 9.1 % (ref 5–12)
NEUTROPHILS NFR BLD AUTO: 7.64 10*3/MM3 (ref 1.7–7)
NEUTROPHILS NFR BLD AUTO: 71.7 % (ref 42.7–76)
NRBC BLD AUTO-RTO: 0 /100 WBC (ref 0–0.2)
PHOSPHATE SERPL-MCNC: 2.7 MG/DL (ref 2.5–4.5)
PLATELET # BLD AUTO: 293 10*3/MM3 (ref 140–450)
PMV BLD AUTO: 10.6 FL (ref 6–12)
POTASSIUM SERPL-SCNC: 4 MMOL/L (ref 3.5–5.2)
PROT SERPL-MCNC: 6 G/DL (ref 6–8.5)
RBC # BLD AUTO: 4.7 10*6/MM3 (ref 3.77–5.28)
SODIUM SERPL-SCNC: 141 MMOL/L (ref 136–145)
WBC NRBC COR # BLD AUTO: 10.65 10*3/MM3 (ref 3.4–10.8)

## 2025-03-03 PROCEDURE — 25810000003 SODIUM CHLORIDE 0.9 % SOLUTION: Performed by: FAMILY MEDICINE

## 2025-03-03 PROCEDURE — 84100 ASSAY OF PHOSPHORUS: CPT | Performed by: FAMILY MEDICINE

## 2025-03-03 PROCEDURE — 85025 COMPLETE CBC W/AUTO DIFF WBC: CPT | Performed by: FAMILY MEDICINE

## 2025-03-03 PROCEDURE — 36415 COLL VENOUS BLD VENIPUNCTURE: CPT | Performed by: FAMILY MEDICINE

## 2025-03-03 PROCEDURE — 25010000002 PIPERACILLIN SOD-TAZOBACTAM PER 1 G: Performed by: INTERNAL MEDICINE

## 2025-03-03 PROCEDURE — 80053 COMPREHEN METABOLIC PANEL: CPT | Performed by: FAMILY MEDICINE

## 2025-03-03 PROCEDURE — 83735 ASSAY OF MAGNESIUM: CPT | Performed by: FAMILY MEDICINE

## 2025-03-03 RX ORDER — LORAZEPAM 0.5 MG/1
0.5 TABLET ORAL ONCE
Status: COMPLETED | OUTPATIENT
Start: 2025-03-03 | End: 2025-03-03

## 2025-03-03 RX ORDER — FAMOTIDINE 20 MG/1
20 TABLET, FILM COATED ORAL
Status: DISCONTINUED | OUTPATIENT
Start: 2025-03-03 | End: 2025-03-04 | Stop reason: HOSPADM

## 2025-03-03 RX ADMIN — DESVENLAFAXINE SUCCINATE 50 MG: 50 TABLET, EXTENDED RELEASE ORAL at 09:27

## 2025-03-03 RX ADMIN — LORAZEPAM 0.5 MG: 0.5 TABLET ORAL at 11:52

## 2025-03-03 RX ADMIN — PIPERACILLIN AND TAZOBACTAM 3.38 G: 3; .375 INJECTION, POWDER, FOR SOLUTION INTRAVENOUS; PARENTERAL at 05:01

## 2025-03-03 RX ADMIN — PANTOPRAZOLE SODIUM 40 MG: 40 INJECTION, POWDER, FOR SOLUTION INTRAVENOUS at 05:01

## 2025-03-03 RX ADMIN — ACETAMINOPHEN 650 MG: 325 TABLET ORAL at 00:19

## 2025-03-03 RX ADMIN — PIPERACILLIN AND TAZOBACTAM 3.38 G: 3; .375 INJECTION, POWDER, FOR SOLUTION INTRAVENOUS; PARENTERAL at 15:39

## 2025-03-03 RX ADMIN — PIPERACILLIN AND TAZOBACTAM 3.38 G: 3; .375 INJECTION, POWDER, FOR SOLUTION INTRAVENOUS; PARENTERAL at 21:46

## 2025-03-03 RX ADMIN — SODIUM CHLORIDE 100 ML/HR: 9 INJECTION, SOLUTION INTRAVENOUS at 02:04

## 2025-03-03 RX ADMIN — FAMOTIDINE 20 MG: 20 TABLET, FILM COATED ORAL at 18:15

## 2025-03-03 NOTE — PLAN OF CARE
Goal Outcome Evaluation:  Plan of Care Reviewed With: patient           Outcome Evaluation: AOx4; RA; up ad minh; IV abx; one time dose of antianxiety medication per order; upgrading diet; safety maintained

## 2025-03-03 NOTE — PAYOR COMM NOTE
"Cruz Jefferson (54 y.o. Female)  [id number]   Admit 3/1   [facility]   [place] phone    fax          Date of Birth   1970    Social Security Number       Address   [address] [facility] [address]    Home Phone   [phone]    MRN   [id number]       Caodaism   Other    Marital Status                               Admission Date   3/1/25    Admission Type   Emergency    Admitting Provider   Shay Navarro DO    Attending Provider   Shay Navarro DO    Department, Room/Bed   [facility] 3C, 371/1       Discharge Date       Discharge Disposition       Discharge Destination                                 Attending Provider: Shay Navarro DO    Allergies: No Known Allergies    Isolation: None   Infection: None   Code Status: CPR    Ht: 172.7 cm (68\")   Wt: 94.1 kg (207 lb 8 oz)    Admission Cmt: None   Principal Problem: SBO (small bowel obstruction) [K56.609]                   Active Insurance as of 3/1/2025       Primary Coverage       Payor Plan Insurance Group Employer/Plan Group    DaWanda DaWanda [id number]       Payor Plan Address Payor Plan Phone Number Payor Plan Fax Number Effective Dates    [address] [phone]  1/1/2021 - None Entered    [facility] [id number]         Subscriber Name Subscriber Birth Date Member ID       CRUZ JEFFERSON 1970 [id number]                     Emergency Contacts        (Rel.) Home Phone Work Phone Mobile Phone    Natasha Mena (Sister) [phone] -- --                 History & Physical        Micheal Valentin MD at 03/01/25 2058              [facility] Medicine Services  HISTORY AND PHYSICAL    Date of Admission: 3/1/2025  Primary Care Physician: Lizett Ruffin MD    Subjective   Primary Historian: Patient    Chief Complaint: Abdominal pain, nausea and vomiting    History of Present Illness  54 year old female that " presents to the ER with complaints of severe abdominal pain, nausea and vomiting since 1 PM. Symptoms persisted and she decided to come to the ER for evaluation. CT abd/pelvis report: suspicious that there are early changes of small bowel obstruction with transition point in the RIGHT mid to upper abdomen. Decompression of distal small bowel noted. Mild distention of proximal small bowel loops with maximum diameter of approximately 2.5 cm.     Review of Systems   Otherwise complete ROS reviewed and negative except as mentioned in the HPI.    Past Medical History:   Past Medical History:   Diagnosis Date    Anxiety     Arthritis     Depression      Past Surgical History:  Past Surgical History:   Procedure Laterality Date    APPENDECTOMY      AUGMENTATION MAMMAPLASTY Left 2007    pt had a lift and implant on the left side only    BREAST SURGERY Left     lifted and implant    KNEE SURGERY      SINUS SURGERY      TONSILLECTOMY       Social History:  reports that she has never smoked. She does not have any smokeless tobacco history on file. She reports that she does not currently use alcohol. She reports that she does not use drugs.    Family History: family history includes Anuerysm in her mother; Breast cancer in her paternal aunt and paternal aunt; Diabetes in her father.       Allergies:  No Known Allergies    Medications:  Prior to Admission medications    Medication Sig Start Date End Date Taking? Authorizing Provider   Ascorbic Acid (VITAMIN C PO) Take  by mouth Daily.    Emergency, Nurse Can RN   busPIRone (BUSPAR) 15 MG tablet Take 1 tablet by mouth 2 (Two) Times a Day.    Emergency, Nurse Epic, RN   cetirizine (zyrTEC) 10 MG tablet Take 1 tablet by mouth Daily.    ProviderSarai MD   ciprofloxacin (CIPRO) 250 MG tablet Take 1 tablet by mouth 2 (Two) Times a Day. 10/17/16   Koko Schmitz MD   desvenlafaxine (PRISTIQ) 50 MG 24 hr tablet Take 1 tablet by mouth Daily.    ProviderSarai MD  "  HYDROcodone-acetaminophen (NORCO) 5-325 MG per tablet Take 1 tablet by mouth Every 6 (Six) Hours As Needed for Moderate Pain. 10/25/23   Shauna Olsen APRN   meloxicam (MOBIC) 15 MG tablet Take 15 mg by mouth Daily.    Emergency, Nurse Epic, RN   Multiple Vitamins-Minerals (VITEYES AREDS ADVANCED PO) Take  by mouth.    Provider, MD Sarai   Naproxen Sodium (ALEVE PO) Take 500 mg by mouth 2 (Two) Times a Day.    Provider, MD Sarai   ondansetron ODT (ZOFRAN-ODT) 4 MG disintegrating tablet Place 1 tablet on the tongue Every 6 (Six) Hours As Needed for Nausea. 10/25/22   Aurora Wynn APRN   pantoprazole (PROTONIX) 40 MG EC tablet Take 1 tablet by mouth Daily. 10/25/22   Aurora Wynn APRN   venlafaxine (EFFEXOR) 75 MG tablet Take 75 mg by mouth Daily.    Emergency, Nurse Epic, RN   VITAMIN D, CHOLECALCIFEROL, PO Take  by mouth Daily.    Emergency, Nurse Epic, RN     I have utilized all available immediate resources to obtain, update, or review the patient's current medications (including all prescriptions, over-the-counter products, herbals, cannabis/cannabidiol products, and vitamin/mineral/dietary (nutritional) supplements).    Objective     Vital Signs: /79   Pulse 84   Temp 96.8 °F (36 °C) (Temporal)   Resp 20   Ht 172.7 cm (68\")   Wt 93 kg (205 lb)   LMP 09/26/2016   SpO2 92%   BMI 31.17 kg/m²   Physical Exam  Vitals reviewed.   Constitutional:       General: She is not in acute distress.     Appearance: She is well-developed. She is not toxic-appearing.   HENT:      Head: Normocephalic and atraumatic.      Right Ear: External ear normal.      Left Ear: External ear normal.      Mouth/Throat:      Mouth: Mucous membranes are dry.      Pharynx: Oropharynx is clear.   Eyes:      General:         Right eye: No discharge.         Left eye: No discharge.      Extraocular Movements: Extraocular movements intact.      Conjunctiva/sclera: Conjunctivae normal.      Pupils: " Pupils are equal, round, and reactive to light.   Neck:      Vascular: No JVD.   Cardiovascular:      Rate and Rhythm: Normal rate and regular rhythm.      Pulses: Normal pulses.      Heart sounds: Normal heart sounds. No murmur heard.     No friction rub. No gallop.   Pulmonary:      Effort: Pulmonary effort is normal. No respiratory distress.      Breath sounds: No stridor. No wheezing, rhonchi or rales.   Chest:      Chest wall: No tenderness.   Abdominal:      General: There is no distension.      Palpations: Abdomen is soft.      Tenderness: There is abdominal tenderness. There is no guarding or rebound.      Hernia: No hernia is present.   Musculoskeletal:         General: No swelling, tenderness or deformity. Normal range of motion.      Cervical back: Normal range of motion and neck supple. No rigidity or tenderness. No muscular tenderness.      Right lower leg: No edema.      Left lower leg: No edema.   Skin:     General: Skin is warm and dry.      Findings: No erythema or rash.   Neurological:      General: No focal deficit present.      Mental Status: She is alert and oriented to person, place, and time.      Cranial Nerves: No cranial nerve deficit.      Sensory: No sensory deficit.      Motor: No weakness or abnormal muscle tone.      Deep Tendon Reflexes: Reflexes normal.   Psychiatric:         Mood and Affect: Mood normal.         Behavior: Behavior normal.              Results Reviewed:  Lab Results (last 24 hours)       Procedure Component Value Units Date/Time    Urinalysis With Culture If Indicated - Urine, Clean Catch [[id number]]  (Abnormal) Collected: 03/01/25 1728    Specimen: Urine, Clean Catch Updated: 03/01/25 1751     Color, UA Yellow     Appearance, UA Clear     pH, UA 8.5     Specific Gravity, UA 1.023     Glucose, UA Negative     Ketones, UA Trace     Bilirubin, UA Negative     Blood, UA Negative     Protein, UA Negative     Leuk Esterase, UA Small (1+)     Nitrite, UA Negative      Urobilinogen, UA 1.0 E.U./dL    Narrative:      In absence of clinical symptoms, the presence of pyuria, bacteria, and/or nitrites on the urinalysis result does not correlate with infection.    Urinalysis, Microscopic Only - Urine, Clean Catch [[id number]]  (Abnormal) Collected: 03/01/25 1728    Specimen: Urine, Clean Catch Updated: 03/01/25 1751     RBC, UA 0-2 /HPF      WBC, UA 3-5 /HPF      Comment: Urine culture not indicated.        Bacteria, UA None Seen /HPF      Squamous Epithelial Cells, UA 0-2 /HPF      Hyaline Casts, UA None Seen /LPF      Methodology Manual Light Microscopy    Comprehensive Metabolic Panel [[id number]]  (Abnormal) Collected: 03/01/25 1658    Specimen: Blood Updated: 03/01/25 1732     Glucose 107 mg/dL      BUN 14 mg/dL      Creatinine 0.65 mg/dL      Sodium 140 mmol/L      Potassium 4.1 mmol/L      Chloride 100 mmol/L      CO2 26.0 mmol/L      Calcium 9.9 mg/dL      Total Protein 8.3 g/dL      Albumin 4.3 g/dL      ALT (SGPT) 25 U/L      AST (SGOT) 23 U/L      Alkaline Phosphatase 94 U/L      Total Bilirubin 0.3 mg/dL      Globulin 4.0 gm/dL      A/G Ratio 1.1 g/dL      BUN/Creatinine Ratio 21.5     Anion Gap 14.0 mmol/L      eGFR 103.5 mL/min/1.73     Narrative:      GFR Categories in Chronic Kidney Disease (CKD)      GFR Category          GFR (mL/min/1.73)    Interpretation  G1                     90 or greater         Normal or high (1)  G2                      60-89                Mild decrease (1)  G3a                   45-59                Mild to moderate decrease  G3b                   30-44                Moderate to severe decrease  G4                    15-29                Severe decrease  G5                    14 or less           Kidney failure          (1)In the absence of evidence of kidney disease, neither GFR category G1 or G2 fulfill the criteria for CKD.    eGFR calculation 2021 CKD-EPI creatinine equation, which does not include race as a factor    Lipase [[id number]]   (Abnormal) Collected: 03/01/25 1658    Specimen: Blood Updated: 03/01/25 1727     Lipase 73 U/L     CBC & Differential [[id number]]  (Abnormal) Collected: 03/01/25 1658    Specimen: Blood Updated: 03/01/25 1715    Narrative:      The following orders were created for panel order CBC & Differential.  Procedure                               Abnormality         Status                     ---------                               -----------         ------                     CBC Auto Differential[[id number]]        Abnormal            Final result                 Please view results for these tests on the individual orders.    CBC Auto Differential [[id number]]  (Abnormal) Collected: 03/01/25 1658    Specimen: Blood Updated: 03/01/25 1715     WBC 11.79 10*3/mm3      RBC 5.32 10*6/mm3      Hemoglobin 15.5 g/dL      Hematocrit 48.0 %      MCV 90.2 fL      MCH 29.1 pg      MCHC 32.3 g/dL      RDW 14.2 %      RDW-SD 47.4 fl      MPV 10.3 fL      Platelets 341 10*3/mm3      Neutrophil % 68.1 %      Lymphocyte % 22.1 %      Monocyte % 7.9 %      Eosinophil % 1.0 %      Basophil % 0.4 %      Immature Grans % 0.5 %      Neutrophils, Absolute 8.03 10*3/mm3      Lymphocytes, Absolute 2.60 10*3/mm3      Monocytes, Absolute 0.93 10*3/mm3      Eosinophils, Absolute 0.12 10*3/mm3      Basophils, Absolute 0.05 10*3/mm3      Immature Grans, Absolute 0.06 10*3/mm3      nRBC 0.0 /100 WBC           Imaging Results (Last 24 Hours)       Procedure Component Value Units Date/Time    CT Abdomen Pelvis With Contrast [[id number]] Collected: 03/01/25 1737     Updated: 03/01/25 1751    Narrative:      EXAMINATION: CT ABDOMEN PELVIS W CONTRAST-  3/1/2025 5:37 PM     HISTORY: Epigastric and RUQ abdominal pain, sudden onset at noon today  after eating     COMPARISON: 10/25/2022     DLP: 820.02 mGy.cm     In order to have a CT radiation dose as low as reasonably achievable,  Automated Exposure Control was utilized for adjustment of the mA and/or  KV  according to patient size.     TECHNIQUE: Following the intravenous administration of contrast, helical  CT tomographic images of the abdomen and pelvis were acquired. Coronal  and sagittal reformatted images were also provided for review. No oral  contrast.     FINDINGS:  I suspect that there is a breast implant at the lower aspect of the LEFT  breast which is partially included on this examination. I don't see  acute findings in the lower thorax. The heart is not enlarged. Small  hiatal hernia.     Liver, gallbladder, pancreas, spleen, adrenal glands, bilateral kidneys,  and bilateral ureters are without acute findings.     No free intraperitoneal air. No retroperitoneal mass, adenopathy, or  hemorrhage.     Stool noted in the colon. Findings consistent with mild fecal stasis.  Previous appendectomy changes noted. Some high density material in the  stomach may represent ingested gastric contents but are nonspecific.  There are loops of distended small bowel in the upper abdomen. Air-fluid  levels in distended loops of small bowel in the upper abdomen. Distal  small bowel is largely decompressed. I am suspicious that there may be a  transition point (series 3, image 15) in the upper abdomen at midline  from mildly distended to nondistended loops of small bowel and a small  bowel obstruction should be considered.     No free fluid or free air in the abdomen. Atherosclerotic vascular  disease in the aorta. No aneurysmal dilation.     Surrounding soft tissue structures are without acute findings. Osseous  structures are also without acute findings. No pelvic mass or  adenopathy. Urinary bladder is decompressed. Incidental note of chronic  tear of the LEFT rectus femoris, proximally. Degenerative changes in the  spine with grade 1 anterolisthesis of L4 on L5, likely degenerative.          Impression:         1.  I am suspicious that there are early changes of small bowel  obstruction with transition point in the RIGHT  mid to upper abdomen.  Decompression of distal small bowel noted. Mild distention of proximal  small bowel loops with maximum diameter of approximately 2.5 cm.     2.  Moderate fecal stasis.      This report was signed and finalized on 3/1/2025 5:48 PM by Dr. Nasim Durán MD.             I have personally reviewed and interpreted the radiology studies and ECG obtained at time of admission.     Assessment / Plan   Assessment:   Active Hospital Problems    Diagnosis     **SBO (small bowel obstruction)      Treatment Plan  The patient will be admitted to my service here at [facility].   Admit to medical floor  Vitals every 4 hours  Diet NPO  IVF  cc/hour  Up add minh    SBO  General surgery in AM  Pain control  Zofran 4 mg IVP q6h prn  If recurrent emesis will need NGT    Lipase slight elevation  Repeat in AM    DVT prophylaxis > SCD     Care Planning  Shared decision making: Patient  Code status and discussions: Full code    Disposition  Social Determinants of Health that impact treatment or disposition: none  Estimated length of stay is to be determined.     I confirmed that the patient's advanced care plan is present, code status is documented, and a surrogate decision maker is listed in the patient's medical record.     The patient's surrogate decision maker is family, see records.     The patient was seen and examined by me on 3/01/2025 at 2001.    Electronically signed by Micheal Valentin MD, 03/01/25, 20:58 CST.              Electronically signed by Micheal Valentin MD at 03/01/25 2125          Emergency Department Notes        Kayy Frey, RN at 03/01/25 2050          Nursing report ED to floor  Katlyn Burger  54 y.o.  female    HPI:   Chief Complaint   Patient presents with    Abdominal Pain       Admitting doctor:   Micheal Valentin MD    Consulting provider(s):  Consults       No orders found from 1/31/2025 to 3/2/2025.             Admitting diagnosis:   The  "primary encounter diagnosis was SBO (small bowel obstruction). Diagnoses of Nausea and vomiting, unspecified vomiting type and Constipation, unspecified constipation type were also pertinent to this visit.    Code status:   Current Code Status       Date Active Code Status Order ID Comments User Context       Not on file            Allergies:   Patient has no known allergies.    Intake and Output  No intake or output data in the 24 hours ending 03/01/25 2057    Weight:       03/01/25  1613   Weight: 93 kg (205 lb)       Most recent vitals:   Vitals:    03/01/25 1613 03/01/25 1701   BP: 143/97    BP Location: Right arm    Patient Position: Sitting    Pulse: 75 84   Resp: 20    Temp: 96.8 °F (36 °C)    TempSrc: Temporal    SpO2: 100% 92%   Weight: 93 kg (205 lb)    Height: 172.7 cm (68\")      Oxygen Therapy: .    Active LDAs/IV Access:   Lines, Drains & Airways       Active LDAs       Name Placement date Placement time Site Days    Peripheral IV 03/01/25 1659 Left Antecubital 03/01/25 1659  Antecubital  less than 1                    Labs (abnormal labs have a star):   Labs Reviewed   COMPREHENSIVE METABOLIC PANEL - Abnormal; Notable for the following components:       Result Value    Glucose 107 (*)     All other components within normal limits    Narrative:     GFR Categories in Chronic Kidney Disease (CKD)      GFR Category          GFR (mL/min/1.73)    Interpretation  G1                     90 or greater         Normal or high (1)  G2                      60-89                Mild decrease (1)  G3a                   45-59                Mild to moderate decrease  G3b                   30-44                Moderate to severe decrease  G4                    15-29                Severe decrease  G5                    14 or less           Kidney failure          (1)In the absence of evidence of kidney disease, neither GFR category G1 or G2 fulfill the criteria for CKD.    eGFR calculation 2021 CKD-EPI creatinine " equation, which does not include race as a factor   LIPASE - Abnormal; Notable for the following components:    Lipase 73 (*)     All other components within normal limits   URINALYSIS W/ CULTURE IF INDICATED - Abnormal; Notable for the following components:    pH, UA 8.5 (*)     Ketones, UA Trace (*)     Leuk Esterase, UA Small (1+) (*)     All other components within normal limits    Narrative:     In absence of clinical symptoms, the presence of pyuria, bacteria, and/or nitrites on the urinalysis result does not correlate with infection.   CBC WITH AUTO DIFFERENTIAL - Abnormal; Notable for the following components:    WBC 11.79 (*)     RBC 5.32 (*)     Hematocrit 48.0 (*)     Neutrophils, Absolute 8.03 (*)     Monocytes, Absolute 0.93 (*)     Immature Grans, Absolute 0.06 (*)     All other components within normal limits   URINALYSIS, MICROSCOPIC ONLY - Abnormal; Notable for the following components:    WBC, UA 3-5 (*)     All other components within normal limits   CBC AND DIFFERENTIAL    Narrative:     The following orders were created for panel order CBC & Differential.  Procedure                               Abnormality         Status                     ---------                               -----------         ------                     CBC Auto Differential[[id number]]        Abnormal            Final result                 Please view results for these tests on the individual orders.       Meds given in ED:   Medications   sodium chloride 0.9 % flush 10 mL (has no administration in time range)   ondansetron (ZOFRAN) injection 4 mg (4 mg Intravenous Given 3/1/25 1659)   iopamidol (ISOVUE-300) 61 % injection 100 mL (100 mL Intravenous Given 3/1/25 1724)   sodium chloride 0.9 % bolus 1,000 mL (1,000 mL Intravenous New Bag 3/1/25 1930)   HYDROmorphone (DILAUDID) injection 0.5 mg (0.5 mg Intravenous Given 3/1/25 1930)           NIH Stroke Scale:       Isolation/Infection(s):  No active isolations   No active  infections     COVID Testing  Collected .  Resulted .    Nursing report ED to floor:  Mental status: .  Ambulatory status: .  Precautions: .    ED nurse phone extentsion- ..      Electronically signed by Kayy Frey RN at 03/01/25 2057       Kayy Payne APRN at 03/01/25 1623       Attestation signed by Ronn Mcfadden MD at 03/02/25 0140        SUPERVISE: For this patient encounter, I reviewed the APC's documentation, treatment plan, and medical decision making.    Ronn Mcfadden MD 3/2/2025 01:40 CST                         Subjective   History of Present Illness  Patient is a 54-year-old female who presents to the ED today complaining of epigastric and right upper quadrant abdominal pain, sudden onset at noon today after eating cookies.  She states she has had significant gas over the last month.  She did have 1 episode of vomiting today, tried Gas-X and heating pad with no relief.  She denies any diarrhea, constipation, dysuria, fever, or other systemic symptoms.  On exam there is tenderness noted to the right upper quadrant in the epigastric region.  She denies any alcohol use, only previous abdominal surgery is an appendectomy.  Denies any vaginal related complaints.  PMH is all significant for anxiety, arthritis, and depression.  Differential diagnoses: GERD, gastroenteritis, pancreatitis, UTI, electrolyte imbalance, and other.    History provided by:  Patient   used: No        Review of Systems   Constitutional: Negative.    HENT: Negative.     Eyes: Negative.    Respiratory: Negative.     Cardiovascular: Negative.    Gastrointestinal:  Positive for abdominal pain, constipation, nausea and vomiting. Negative for diarrhea.   Genitourinary: Negative.    Musculoskeletal: Negative.    Skin: Negative.    Neurological: Negative.    Psychiatric/Behavioral: Negative.         Past Medical History:   Diagnosis Date    Anxiety     Arthritis     Depression        No Known  Allergies    Past Surgical History:   Procedure Laterality Date    APPENDECTOMY      AUGMENTATION MAMMAPLASTY Left 2007    pt had a lift and implant on the left side only    BREAST SURGERY Left     lifted and implant    KNEE SURGERY      SINUS SURGERY      TONSILLECTOMY         Family History   Problem Relation Age of Onset    Anuerysm Mother     Diabetes Father     Breast cancer Paternal Aunt     Breast cancer Paternal Aunt        Social History     Socioeconomic History    Marital status:    Tobacco Use    Smoking status: Never   Vaping Use    Vaping status: Never Used   Substance and Sexual Activity    Alcohol use: Not Currently    Drug use: No    Sexual activity: Defer       Objective   Physical Exam  Vitals and nursing note reviewed.   Constitutional:       General: She is not in acute distress.     Appearance: Normal appearance. She is not toxic-appearing or diaphoretic.   HENT:      Head: Normocephalic and atraumatic.      Right Ear: External ear normal.      Left Ear: External ear normal.      Nose: Nose normal.      Mouth/Throat:      Mouth: Mucous membranes are moist.   Eyes:      Conjunctiva/sclera: Conjunctivae normal.   Cardiovascular:      Rate and Rhythm: Normal rate and regular rhythm.      Pulses: Normal pulses.      Heart sounds: Normal heart sounds.   Pulmonary:      Effort: Pulmonary effort is normal.      Breath sounds: Normal breath sounds.   Abdominal:      General: Bowel sounds are decreased. There is no distension.      Palpations: Abdomen is soft.      Tenderness: There is abdominal tenderness in the right upper quadrant and epigastric area. There is no guarding or rebound.   Skin:     General: Skin is warm and dry.   Neurological:      Mental Status: She is alert and oriented to person, place, and time. Mental status is at baseline.      GCS: GCS eye subscore is 4. GCS verbal subscore is 5. GCS motor subscore is 6.   Psychiatric:         Mood and Affect: Mood normal.          Behavior: Behavior normal.         Thought Content: Thought content normal.         Judgment: Judgment normal.       Labs Reviewed   COMPREHENSIVE METABOLIC PANEL - Abnormal; Notable for the following components:       Result Value    Glucose 107 (*)     All other components within normal limits    Narrative:     GFR Categories in Chronic Kidney Disease (CKD)      GFR Category          GFR (mL/min/1.73)    Interpretation  G1                     90 or greater         Normal or high (1)  G2                      60-89                Mild decrease (1)  G3a                   45-59                Mild to moderate decrease  G3b                   30-44                Moderate to severe decrease  G4                    15-29                Severe decrease  G5                    14 or less           Kidney failure          (1)In the absence of evidence of kidney disease, neither GFR category G1 or G2 fulfill the criteria for CKD.    eGFR calculation 2021 CKD-EPI creatinine equation, which does not include race as a factor   LIPASE - Abnormal; Notable for the following components:    Lipase 73 (*)     All other components within normal limits   URINALYSIS W/ CULTURE IF INDICATED - Abnormal; Notable for the following components:    pH, UA 8.5 (*)     Ketones, UA Trace (*)     Leuk Esterase, UA Small (1+) (*)     All other components within normal limits    Narrative:     In absence of clinical symptoms, the presence of pyuria, bacteria, and/or nitrites on the urinalysis result does not correlate with infection.   CBC WITH AUTO DIFFERENTIAL - Abnormal; Notable for the following components:    WBC 11.79 (*)     RBC 5.32 (*)     Hematocrit 48.0 (*)     Neutrophils, Absolute 8.03 (*)     Monocytes, Absolute 0.93 (*)     Immature Grans, Absolute 0.06 (*)     All other components within normal limits   URINALYSIS, MICROSCOPIC ONLY - Abnormal; Notable for the following components:    WBC, UA 3-5 (*)     All other components within  normal limits   MAGNESIUM   PHOSPHORUS   COMPREHENSIVE METABOLIC PANEL   LIPASE   CBC WITH AUTO DIFFERENTIAL   CBC AND DIFFERENTIAL    Narrative:     The following orders were created for panel order CBC & Differential.  Procedure                               Abnormality         Status                     ---------                               -----------         ------                     CBC Auto Differential[[id number]]        Abnormal            Final result                 Please view results for these tests on the individual orders.   CBC AND DIFFERENTIAL    Narrative:     The following orders were created for panel order CBC & Differential.  Procedure                               Abnormality         Status                     ---------                               -----------         ------                     CBC Auto Differential[[id number]]                                                         Please view results for these tests on the individual orders.     CT Abdomen Pelvis With Contrast   Final Result       1.  I am suspicious that there are early changes of small bowel   obstruction with transition point in the RIGHT mid to upper abdomen.   Decompression of distal small bowel noted. Mild distention of proximal   small bowel loops with maximum diameter of approximately 2.5 cm.       2.  Moderate fecal stasis.        This report was signed and finalized on 3/1/2025 5:48 PM by Dr. Nasim Durán MD.          XR Abdomen KUB    (Results Pending)     Medications   sodium chloride 0.9 % flush 10 mL (has no administration in time range)   desvenlafaxine (PRISTIQ) 24 hr tablet 50 mg (has no administration in time range)   sodium chloride 0.9 % flush 10 mL (10 mL Intravenous Given 3/1/25 6458)   sodium chloride 0.9 % flush 10 mL (has no administration in time range)   sodium chloride 0.9 % infusion 40 mL (has no administration in time range)   sodium chloride 0.9 % infusion (100 mL/hr Intravenous New Bag  3/1/25 2226)   Potassium Replacement - Follow Nurse / BPA Driven Protocol (has no administration in time range)   Magnesium Standard Dose Replacement - Follow Nurse / BPA Driven Protocol (has no administration in time range)   Phosphorus Replacement - Follow Nurse / BPA Driven Protocol (has no administration in time range)   Calcium Replacement - Follow Nurse / BPA Driven Protocol (has no administration in time range)   acetaminophen (TYLENOL) tablet 650 mg (has no administration in time range)     Or   acetaminophen (TYLENOL) 160 MG/5ML oral solution 650 mg (has no administration in time range)     Or   acetaminophen (TYLENOL) suppository 650 mg (has no administration in time range)   HYDROmorphone (DILAUDID) injection 0.5 mg (0.5 mg Intravenous Given 3/1/25 2233)     And   naloxone (NARCAN) injection 0.4 mg (has no administration in time range)   ondansetron (ZOFRAN) injection 4 mg (4 mg Intravenous Given 3/1/25 2223)   ondansetron (ZOFRAN) injection 4 mg (4 mg Intravenous Given 3/1/25 1659)   iopamidol (ISOVUE-300) 61 % injection 100 mL (100 mL Intravenous Given 3/1/25 1724)   sodium chloride 0.9 % bolus 1,000 mL (1,000 mL Intravenous New Bag 3/1/25 1930)   HYDROmorphone (DILAUDID) injection 0.5 mg (0.5 mg Intravenous Given 3/1/25 1930)     Procedures          ED Course  ED Course as of 03/02/25 0017   Sat Mar 01, 2025   1800 Case discussed with Dr. Garza as well as Dr. Navarro. Dr. Garza recommends enema and PO challenge. He does not feel that this is a true SBO. [HE]   1951 Nursing staff have reported that the patient has not tolerated her PO challenge, will call hospitalist for admission. [HE]   2000 Case discussed with hospitalist on-call, Dr. Valentin who is agreeable to admission. [HE]      ED Course User Index  [HE] Kayy Payne APRN                                                       Medical Decision Making  Patient is a 54-year-old female who presents to the ED today complaining of epigastric and  right upper quadrant abdominal pain, sudden onset at noon today after eating cookies.  She states she has had significant gas over the last month.  She did have 1 episode of vomiting today, tried Gas-X and heating pad with no relief.  She denies any diarrhea, constipation, dysuria, fever, or other systemic symptoms.  On exam there is tenderness noted to the right upper quadrant in the epigastric region.  She denies any alcohol use, only previous abdominal surgery is an appendectomy.  Denies any vaginal related complaints.  PMH is all significant for anxiety, arthritis, and depression.  Differential diagnoses: GERD, gastroenteritis, pancreatitis, UTI, electrolyte imbalance, and other.    Patient was given IV fluids, Zofran, and Dilaudid in the ED for symptom management.    UA is positive for white cells, ketones, and leukocytes.  Nitrite and bacteria negative.  She denies UTI symptoms.  CMP reveals normal renal function and liver function, normal electrolytes.  CBC reveals mild leukocytosis, otherwise normal.  Lipase very mildly elevated at 73.    CT abdomen pelvis reveals I am suspicious that there are early changes of small bowel  obstruction with transition point in the RIGHT mid to upper abdomen.  Decompression of distal small bowel noted. Mild distention of proximal  small bowel loops with maximum diameter of approximately 2.5 cm. Moderate fecal stasis.    Results discussed at bedside.  On reeval patient is complaining of continued pain and has just returned from the bathroom where she had an episode of vomiting.  Case discussed with Dr. Garza as well as Dr. Navarro. Dr. Garza recommends enema and PO challenge. He does not feel that this is a true SBO.  Nursing staff have reported that the patient has not tolerated her PO challenge, has received an enema and had a single watery stool, will call hospitalist for admission.  Case discussed with hospitalist on-call, Dr. Valentin who is agreeable to  admission.    Problems Addressed:  Constipation, unspecified constipation type: complicated acute illness or injury  Nausea and vomiting, unspecified vomiting type: complicated acute illness or injury  SBO (small bowel obstruction): complicated acute illness or injury    Amount and/or Complexity of Data Reviewed  Labs: ordered.  Radiology: ordered.    Risk  Prescription drug management.  Decision regarding hospitalization.        Final diagnoses:   SBO (small bowel obstruction)   Nausea and vomiting, unspecified vomiting type   Constipation, unspecified constipation type       ED Disposition  ED Disposition       ED Disposition   Decision to Admit    Condition   --    Comment   Level of Care: Med/Surg [1]   Diagnosis: SBO (small bowel obstruction) [158333]   Admitting Physician: ABHINAV SALINAS [[id number]]   Certification: I Certify That Inpatient Hospital Services Are Medically Necessary For Greater Than 2 Midnights                 No follow-up provider specified.       Medication List      No changes were made to your prescriptions during this visit.            Kayy Payne, APRN  03/02/25 0017      Electronically signed by Ronn Mcfadden MD at 03/02/25 0140       Vital Signs (last 3 days)       Date/Time Temp Temp src Pulse Resp BP Patient Position SpO2    03/03/25 0811 98.2 (36.8) Oral 104 16 120/69 Lying 94    03/03/25 0400 98 (36.7) Oral 102 14 111/57 Lying 95    03/02/25 1853 98.1 (36.7) Oral 93 16 102/66 Lying 100    03/02/25 1621 97.6 (36.4) Oral 103 16 120/64 Lying 95    03/02/25 1100 98 (36.7) Oral 92 16 124/76 Lying 98    03/02/25 0843 97.7 (36.5) Oral 98 16 128/75 Sitting 98    03/02/25 0336 97.5 (36.4) Oral 81 18 147/91 Sitting 98    03/02/25 0100 -- -- -- -- -- -- 98    03/01/25 2233 -- -- -- -- 144/109 Sitting --    03/01/25 2209 97.4 (36.3) Axillary 64 20 157/82 Lying 99    03/01/25 2041 98 (36.7) Oral 76 16 139/79 Sitting 98    03/01/25 1701 -- -- 84 -- -- -- 92    03/01/25 1613 96.8  (36) Temporal 75 20 143/97 Sitting 100          Current Facility-Administered Medications   Medication Dose Route Frequency Provider Last Rate Last Admin    acetaminophen (TYLENOL) tablet 650 mg  650 mg Oral Q4H PRN Micheal Valentin MD   650 mg at 03/03/25 0019    Or    acetaminophen (TYLENOL) 160 MG/5ML oral solution 650 mg  650 mg Oral Q4H PRN Micheal Valentin MD        Or    acetaminophen (TYLENOL) suppository 650 mg  650 mg Rectal Q4H PRN Micheal Valentin MD        Calcium Replacement - Follow Nurse / BPA Driven Protocol   Not Applicable PRN Micheal Valentin MD        desvenlafaxine (PRISTIQ) 24 hr tablet 50 mg  50 mg Oral Daily Micheal Valentin MD   50 mg at 03/03/25 0927    HYDROmorphone (DILAUDID) injection 0.5 mg  0.5 mg Intravenous Q2H PRN Micheal Valentin MD   0.5 mg at 03/02/25 0615    And    naloxone (NARCAN) injection 0.4 mg  0.4 mg Intravenous Q5 Min PRN Micheal Valentin MD        Magnesium Standard Dose Replacement - Follow Nurse / BPA Driven Protocol   Not Applicable PRN Micheal Valentin MD        ondansetron (ZOFRAN) injection 4 mg  4 mg Intravenous Q6H PRN Micheal Valentin MD   4 mg at 03/02/25 0614    pantoprazole (PROTONIX) injection 40 mg  40 mg Intravenous Q AM Shay Navarro DO   40 mg at 03/03/25 0501    Pharmacy To Dose: Piperacillin-tazobactam (Zosyn)   Not Applicable Continuous PRN Shay Navarro DO        Phosphorus Replacement - Follow Nurse / BPA Driven Protocol   Not Applicable PRN Micheal Valentin MD        piperacillin-tazobactam (ZOSYN) 3.375 g IVPB in 100 mL NS MBP (CD)  3.375 g Intravenous Q8H Shay Navarro DO   3.375 g at 03/03/25 0501    Potassium Replacement - Follow Nurse / BPA Driven Protocol   Not Applicable PRN Micheal Valentin MD        sodium chloride 0.9 % flush 10 mL  10 mL Intravenous PRN Micheal Valentin MD        sodium chloride 0.9 % flush 10 mL  10 mL  Intravenous Q12H Micheal Valentin MD   10 mL at 03/02/25 2146    sodium chloride 0.9 % flush 10 mL  10 mL Intravenous PRN Micheal Valentin MD        sodium chloride 0.9 % infusion 40 mL  40 mL Intravenous PRN Micheal Valentin MD        sodium chloride 0.9 % infusion  100 mL/hr Intravenous Continuous Micheal Valentin  mL/hr at 03/03/25 0204 100 mL/hr at 03/03/25 0204        Physician Progress Notes (last 72 hours)        Shay Navarro DO at 03/02/25 1059              [facility] Medicine Services  INPATIENT PROGRESS NOTE    Patient Name: Katlyn Burger  Date of Admission: 3/1/2025  Today's Date: 03/02/25  Length of Stay: 1  Primary Care Physician: Lizett Ruffin MD    Subjective   Chief Complaint: f/u n/v, possible sbo    HPI   Seen resting in bed.  NG tube in place.  Only small rim of bilious fluid in suction container.  Patient states she has been belching a lot this morning but has not vomited.  She denies chest pain or shortness of breath.  No fevers noted overnight.        Review of Systems   All pertinent negatives and positives are as above. All other systems have been reviewed and are negative unless otherwise stated.     Objective    Temp:  [96.8 °F (36 °C)-98 °F (36.7 °C)] 97.7 °F (36.5 °C)  Heart Rate:  [64-98] 98  Resp:  [16-20] 16  BP: (128-157)/() 128/75  Physical Exam  GEN: Awake, alert, interactive, in NAD  HEENT:  PERRLA, EOMI, anicteric, +NGT  Lungs: CTAB, no wheezing/rales/rhonchi  Heart: RRR, +S1/s2, no rub  ABD: slight distension, soft, generalized tenderness w/o rebound, +BS  Extremities: atraumatic, no cyanosis, no edema  Skin: no rashes or lesions  Neuro: AAOx3, no focal deficits  Psych: normal mood & affect        Results Review:  I have reviewed the labs, radiology results, and diagnostic studies.    Laboratory Data:   Results from last 7 days   Lab Units 03/02/25  0525 03/01/25  1658   WBC 10*3/mm3 23.21*  "11.79*   HEMOGLOBIN g/dL 15.8 15.5   HEMATOCRIT % 49.2* 48.0*   PLATELETS 10*3/mm3 321 341        Results from last 7 days   Lab Units 03/02/25  0525 03/01/25  1658   SODIUM mmol/L 138 140   POTASSIUM mmol/L 4.0 4.1   CHLORIDE mmol/L 102 100   CO2 mmol/L 26.0 26.0   BUN mg/dL 17 14   CREATININE mg/dL 0.67 0.65   CALCIUM mg/dL 8.9 9.9   BILIRUBIN mg/dL 0.3 0.3   ALK PHOS U/L 89 94   ALT (SGPT) U/L 20 25   AST (SGOT) U/L 18 23   GLUCOSE mg/dL 125* 107*       Culture Data:   No results found for: \"BLOODCX\", \"URINECX\", \"WOUNDCX\", \"MRSACX\", \"RESPCX\", \"STOOLCX\"    Radiology Data:   Imaging Results (Last 24 Hours)       Procedure Component Value Units Date/Time    XR Abdomen KUB [[id number]] Collected: 03/02/25 0339     Updated: 03/02/25 0342    Narrative:      EXAM/TECHNIQUE: XR ABDOMEN KUB-     INDICATION: NG tube insertion; K56.609-Unspecified intestinal  obstruction, unspecified as to partial versus complete obstruction;  R11.2-Nausea with vomiting, unspecified; K59.00-Constipation,  unspecified     COMPARISON: CT 3/1/2025       Impression:      Findings/impression:     Enteric tube tip is in the proximal to mid stomach.     This report was signed and finalized on 3/2/2025 3:39 AM by Dr. Yovany Madison MD.       CT Abdomen Pelvis With Contrast [[id number]] Collected: 03/01/25 1737     Updated: 03/01/25 1751    Narrative:      EXAMINATION: CT ABDOMEN PELVIS W CONTRAST-  3/1/2025 5:37 PM     HISTORY: Epigastric and RUQ abdominal pain, sudden onset at noon today  after eating     COMPARISON: 10/25/2022     DLP: 820.02 mGy.cm     In order to have a CT radiation dose as low as reasonably achievable,  Automated Exposure Control was utilized for adjustment of the mA and/or  KV according to patient size.     TECHNIQUE: Following the intravenous administration of contrast, helical  CT tomographic images of the abdomen and pelvis were acquired. Coronal  and sagittal reformatted images were also provided for review. No " oral  contrast.     FINDINGS:  I suspect that there is a breast implant at the lower aspect of the LEFT  breast which is partially included on this examination. I don't see  acute findings in the lower thorax. The heart is not enlarged. Small  hiatal hernia.     Liver, gallbladder, pancreas, spleen, adrenal glands, bilateral kidneys,  and bilateral ureters are without acute findings.     No free intraperitoneal air. No retroperitoneal mass, adenopathy, or  hemorrhage.     Stool noted in the colon. Findings consistent with mild fecal stasis.  Previous appendectomy changes noted. Some high density material in the  stomach may represent ingested gastric contents but are nonspecific.  There are loops of distended small bowel in the upper abdomen. Air-fluid  levels in distended loops of small bowel in the upper abdomen. Distal  small bowel is largely decompressed. I am suspicious that there may be a  transition point (series 3, image 15) in the upper abdomen at midline  from mildly distended to nondistended loops of small bowel and a small  bowel obstruction should be considered.     No free fluid or free air in the abdomen. Atherosclerotic vascular  disease in the aorta. No aneurysmal dilation.     Surrounding soft tissue structures are without acute findings. Osseous  structures are also without acute findings. No pelvic mass or  adenopathy. Urinary bladder is decompressed. Incidental note of chronic  tear of the LEFT rectus femoris, proximally. Degenerative changes in the  spine with grade 1 anterolisthesis of L4 on L5, likely degenerative.          Impression:         1.  I am suspicious that there are early changes of small bowel  obstruction with transition point in the RIGHT mid to upper abdomen.  Decompression of distal small bowel noted. Mild distention of proximal  small bowel loops with maximum diameter of approximately 2.5 cm.     2.  Moderate fecal stasis.      This report was signed and finalized on  3/1/2025 5:48 PM by Dr. Nasim Durán MD.               I have reviewed the patient's current medications.     Assessment/Plan   Assessment  Active Hospital Problems    Diagnosis     **SBO (small bowel obstruction)     GERD (gastroesophageal reflux disease)     Leukocytosis     Anxiety        Treatment Plan  #1 ? SBO -patient presenting with nausea and vomiting concern initial CT for possible SBO.  This was a noncontrast study.  There was significant constipation on that study and an enema was attempted in the ER without success.  NG tube was placed and patient was admitted.  Currently getting IV fluids and n.p.o. with NGT in place. Seen by general surgery with plan for repeat ct with gastrografin po.    #2 leukocytosis - wbc has jumped from 11 to 23k? Unclear etiology. No fevers. Pt denies recent diarrhea. Possibly reactive. Check crp, esr, lactic acid. F/u ct results    #3 GERD -IV PPI    #4 anxiety -resume oral meds when able    #5 constipation -received enema last evening.  Await repeat CT scan.  Will likely need to start bowel regimen at some point pending results.  Need to rule out obstruction first.      Medical Decision Making  Number and Complexity of problems: 2-3 acute, multiple chronic  Differential Diagnosis: As above    Conditions and Status        New to me.  Interactive and pleasant.  Does not appear toxic.     MDM Data  External documents reviewed: none  Cardiac tracing (EKG, telemetry) interpretation: none  Radiology interpretation: ct report reviewed  Labs reviewed: as above  Any tests that were considered but not ordered: none     Decision rules/scores evaluated (example [id number], Wells, etc): none     Discussed with: patient, nursing, Dr. Garza     Care Planning  Shared decision making: Patient apprised of current labs, vitals, imaging and treatment plan.  They are agreeable with proceeding with plans as discussed.    Code status and discussions: full code    Disposition  Social  Determinants of Health that impact treatment or disposition: none  I expect the patient to be discharged to home when improved, likely in a few days         Electronically signed by Shay Navarro DO, 03/02/25, 10:59 CST.      Electronically signed by Shay Navarro DO at 03/02/25 1526          Consult Notes (last 72 hours)        Cliff Garza MD at 03/02/25 1324        Consult Orders    1. Inpatient General Surgery Consult [[id number]] ordered by Micheal Valentin MD at 03/01/25 2125                        GENERAL SURGERY CONSULTATION NOTE    Patient Name:  Katlyn Burger  YOB: 1970  MRN: [id number]    Patient Care Team:  Lieztt Ruffin MD as PCP - General  Lizett Ruffin MD as PCP - Family Medicine    Date of Consultation: 03/02/25    Consulting Physician: Shay Navarro DO    Reason for Consult: Abdominal pain, rule out small bowel obstruction    History of Present Illness  Katlyn Burger is a 54 y.o. female that I am seeing in consultation for possible small bowel obstruction.  The patient presented to the emergency department yesterday after eating a cookie and  resulting in abdominal pain and 1 episode of emesis.  She presented to the emergency department where she was found to have a white count of 11,000 and a lipase of 73 but otherwise normal labs.  A CT of the abdomen pelvis with IV contrast only showed a possible small bowel obstruction.  I was contacted for further recommendations.  I personally reviewed the CT scan at the time which did not appear to represent a small bowel obstruction, but she did have a significant amount of colonic stool burden, therefore I recommended repeating a CT scan with Gastrografin and giving her an enema.  Neither of this was performed, and hospital medicine was consulted for admission after NG tube was placed.  Oertli, the patient is complaining of some mild abdominal pain but no nausea or vomiting.  I reordered a CT of the abdomen pelvis  with Gastrografin.    Allergies   No Known Allergies    Medications  desvenlafaxine, 50 mg, Oral, Daily  pantoprazole, 40 mg, Intravenous, Q AM  sodium chloride, 10 mL, Intravenous, Q12H      sodium chloride, 100 mL/hr, Last Rate: 100 mL/hr (03/02/25 8898)      No current facility-administered medications on file prior to encounter.     Current Outpatient Medications on File Prior to Encounter   Medication Sig    cetirizine (zyrTEC) 10 MG tablet Take 1 tablet by mouth Daily.    desvenlafaxine (PRISTIQ) 100 MG 24 hr tablet Take 1 tablet by mouth Daily.    Multiple Vitamins-Minerals (VITEYES AREDS ADVANCED PO) Take 1 tablet by mouth Daily.    [DISCONTINUED] Secukinumab 300 MG/2ML solution auto-injector Inject 300 mg under the skin into the appropriate area as directed Every 28 (Twenty-Eight) Days.    [DISCONTINUED] HYDROcodone-acetaminophen (NORCO) 5-325 MG per tablet Take 1 tablet by mouth Every 6 (Six) Hours As Needed for Moderate Pain. (Patient not taking: Reported on 3/2/2025)    [DISCONTINUED] meloxicam (MOBIC) 15 MG tablet Take 15 mg by mouth Daily. (Patient not taking: Reported on 3/2/2025)    [DISCONTINUED] Naproxen Sodium (ALEVE PO) Take 500 mg by mouth 2 (Two) Times a Day. (Patient not taking: Reported on 3/2/2025)    [DISCONTINUED] ondansetron ODT (ZOFRAN-ODT) 4 MG disintegrating tablet Place 1 tablet on the tongue Every 6 (Six) Hours As Needed for Nausea. (Patient not taking: Reported on 3/2/2025)    [DISCONTINUED] pantoprazole (PROTONIX) 40 MG EC tablet Take 1 tablet by mouth Daily. (Patient not taking: Reported on 3/2/2025)    [DISCONTINUED] venlafaxine (EFFEXOR) 75 MG tablet Take 75 mg by mouth Daily. (Patient not taking: Reported on 3/2/2025)    [DISCONTINUED] VITAMIN D, CHOLECALCIFEROL, PO Take  by mouth Daily. (Patient not taking: Reported on 3/2/2025)       History  Past Medical History:   Diagnosis Date    Anxiety     Arthritis     Depression    ,   Past Surgical History:   Procedure Laterality  Date    APPENDECTOMY      AUGMENTATION MAMMAPLASTY Left 2007    pt had a lift and implant on the left side only    BREAST SURGERY Left     lifted and implant    KNEE SURGERY      SINUS SURGERY      TONSILLECTOMY       Family History   Problem Relation Age of Onset    Anuerysm Mother     Diabetes Father     Breast cancer Paternal Aunt     Breast cancer Paternal Aunt      Social History     Tobacco Use    Smoking status: Never   Vaping Use    Vaping status: Never Used   Substance Use Topics    Alcohol use: Not Currently    Drug use: No   Pt lives in [facility]     Current Facility-Administered Medications:     acetaminophen (TYLENOL) tablet 650 mg, 650 mg, Oral, Q4H PRN **OR** acetaminophen (TYLENOL) 160 MG/5ML oral solution 650 mg, 650 mg, Oral, Q4H PRN **OR** acetaminophen (TYLENOL) suppository 650 mg, 650 mg, Rectal, Q4H PRN, Micheal Valentin MD    Calcium Replacement - Follow Nurse / BPA Driven Protocol, , Not Applicable, Barbie RUIZ Mariano Ariel, MD    desvenlafaxine (PRISTIQ) 24 hr tablet 50 mg, 50 mg, Oral, Daily, Micheal Valentin MD    HYDROmorphone (DILAUDID) injection 0.5 mg, 0.5 mg, Intravenous, Q2H PRN, 0.5 mg at 03/02/25 0615 **AND** naloxone (NARCAN) injection 0.4 mg, 0.4 mg, Intravenous, Q5 Min PRN, Micheal Valentin MD    Magnesium Standard Dose Replacement - Follow Nurse / BPA Driven Protocol, , Not Applicable, Barbie RUIZ Mariano Ariel, MD    ondansetron (ZOFRAN) injection 4 mg, 4 mg, Intravenous, Q6H PRNBarbie Mariano Ariel, MD, 4 mg at 03/02/25 0614    pantoprazole (PROTONIX) injection 40 mg, 40 mg, Intravenous, Q AM, Shay Navarro DO, 40 mg at 03/02/25 1146    Phosphorus Replacement - Follow Nurse / BPA Driven Protocol, , Not Applicable, Barbie RUIZ Mariano Ariel, MD    Potassium Replacement - Follow Nurse / BPA Driven Protocol, , Not Applicable, Barbie RUIZ Mariano Ariel, MD    [COMPLETED] Insert Peripheral IV, , , Once **AND** sodium chloride  "0.9 % flush 10 mL, 10 mL, Intravenous, PRN, Micheal Valentin MD    sodium chloride 0.9 % flush 10 mL, 10 mL, Intravenous, Q12H, Micheal Valentin MD, 10 mL at 03/01/25 2258    sodium chloride 0.9 % flush 10 mL, 10 mL, Intravenous, PRNBarbie Mariano Ariel, MD    sodium chloride 0.9 % infusion 40 mL, 40 mL, Intravenous, PRNBarbie Mariano Ariel, MD    sodium chloride 0.9 % infusion, 100 mL/hr, Intravenous, Continuous, Micheal Valentin MD, Last Rate: 100 mL/hr at 03/02/25 0954, 100 mL/hr at 03/02/25 0954    Review of Systems  A comprehensive 14 point review of systems was performed and is negative unless otherwise noted.     Vital Signs  /76 (BP Location: Right arm, Patient Position: Lying)   Pulse 92   Temp 98 °F (36.7 °C) (Oral)   Resp 16   Ht 172.7 cm (68\")   Wt 94.1 kg (207 lb 8 oz)   LMP 09/26/2016   SpO2 98%   BMI 31.55 kg/m²   Body mass index is 31.55 kg/m².     Intake/Output Summary (Last 24 hours) at 3/2/2025 1416  Last data filed at 3/2/2025 0953  Gross per 24 hour   Intake 974 ml   Output 200 ml   Net 774 ml       Physical Exam  Constitutional:       Appearance: Normal appearance. She is normal weight.      Comments: Elderly female, in no acute distress. Normal development, obese body habitus. Well nourished   HENT:      Head: Normocephalic and atraumatic.      Right Ear: External ear normal.      Left Ear: External ear normal.      Ears:      Comments: Hearing intact     Nose: Nose normal.      Comments: Nares patent, no septal deviation, no drainage     Mouth/Throat:      Comments: Airway patent, dentition intact, mucus membranes moist  Eyes:      Extraocular Movements: Extraocular movements intact.      Conjunctiva/sclera: Conjunctivae normal.      Pupils: Pupils are equal, round, and reactive to light.      Comments: External eyelids grossly normal, vision intact, no scleral icterus   Neck:      Comments: Trachea midline  Cardiovascular:      Rate and Rhythm: " Normal rate.      Comments: Normotensive, no JVD bilaterally  Pulmonary:      Effort: Pulmonary effort is normal.      Breath sounds: Normal breath sounds.      Comments: Normal respiratory rate  Abdominal:      Comments: Obese abdomen, mildly tender diffusely without guarding or peritonitis.  No hernias or masses   Musculoskeletal:         General: Normal range of motion.      Cervical back: Normal range of motion.   Skin:     General: Skin is warm and dry.      Comments: Skin color is consistent with ethnicity   Neurological:      General: No focal deficit present.      Mental Status: She is alert and oriented to person, place, and time.   Psychiatric:         Mood and Affect: Mood normal.         Behavior: Behavior normal.         Thought Content: Thought content normal.         Judgment: Judgment normal.      Comments: Patient understands disease process and has decision making capacity.          Results:  Labs:  I personally reviewed all pertinent labs.   Imaging: I personally reviewed all pertinent imaging studies.  CT of the abdomen pelvis with Gastrografin    ASSESSMENT AND PLAN    Active Hospital Problems    Diagnosis     **SBO (small bowel obstruction)     GERD (gastroesophageal reflux disease)     Leukocytosis     Anxiety        Katlyn Burger is a 54 y.o. female with abdominal pain.  Repeat CT with Gastrografin showed increased ascites and a markedly inflamed and thickened long segment of small bowel with narrowing, however contrast migrated into the colon.  These findings are less suspicious for a small bowel obstruction, and possibly enteritis or inflammatory bowel disease.  NG tube has been removed.  Okay to advance diet as the patient can tolerate. Unclear why her white count has increased this morning.  This may be reactionary or possibly gastroenteritis or IBD.  This is less likely a small bowel obstruction due to adhesive disease.  She may need further workup with a CT enterography or a GI  consult.  She does not appear to have any indications for any urgent surgical intervention.  General surgery will follow peripherally.    I discussed the patient's findings and my recommendations with the patient and/or family, as well as the nursing staff and primary care team.    Cliff Garza MD, FACS  General Surgery  3/2/2025  14:16 CST    Please note that portions of this note were completed with a voice recognition program.      Electronically signed by Cliff Garza MD at 03/02/25 4641         Goal Outcome Evaluation:  Outcome Evaluation: ALOX4. NC1.5l up at minh. NG tube to left nare.      3/1  prn dilaudid iv x1  zofran iv x1  3/2 prn   dilaudid iv  x2  zofran iv x1

## 2025-03-03 NOTE — PLAN OF CARE
Goal Outcome Evaluation:              Outcome Evaluation: ALOX4. RA. up at minh. prn meds given for tylenol. on IV ABX. CL diet.

## 2025-03-03 NOTE — PROGRESS NOTES
[facility] Medicine Services  INPATIENT PROGRESS NOTE    Patient Name: Katlyn Burger  Date of Admission: 3/1/2025  Today's Date: 03/03/25  Length of Stay: 2  Primary Care Physician: Lizett Ruffin MD    Subjective   Chief Complaint: f/u n/v, possible sbo    HPI   Patient seen sitting in chair at bedside.  NG tube is out.  She is feeling fairly anxious but otherwise doing a little better.  She is been tolerating liquid diet and wants to advance.  No significant abdominal pain currently.  She has had multiple BMs since CAT scan yesterday.  No fevers or adverse events noted overnight.        Review of Systems   All pertinent negatives and positives are as above. All other systems have been reviewed and are negative unless otherwise stated.     Objective    Temp:  [97.6 °F (36.4 °C)-98.2 °F (36.8 °C)] 98.2 °F (36.8 °C)  Heart Rate:  [] 91  Resp:  [14-18] 18  BP: (102-120)/(57-71) 116/71  Physical Exam  GEN: Awake, alert, interactive, in NAD  HEENT:  PERRLA, EOMI, anicteric, +NGT  Lungs: CTAB, no wheezing/rales/rhonchi  Heart: RRR, +S1/s2, no rub  ABD: soft, no guarding or rebound, +BS  Extremities: atraumatic, no cyanosis, no edema  Skin: no rashes or lesions  Neuro: AAOx3, no focal deficits  Psych: normal mood & affect        Results Review:  I have reviewed the labs, radiology results, and diagnostic studies.    Laboratory Data:   Results from last 7 days   Lab Units 03/03/25  0905 03/02/25  0525 03/01/25  1658   WBC 10*3/mm3 10.65 23.21* 11.79*   HEMOGLOBIN g/dL 13.5 15.8 15.5   HEMATOCRIT % 43.7 49.2* 48.0*   PLATELETS 10*3/mm3 293 321 341        Results from last 7 days   Lab Units 03/03/25  0421 03/02/25  0525 03/01/25  1658   SODIUM mmol/L 141 138 140   POTASSIUM mmol/L 4.0 4.0 4.1   CHLORIDE mmol/L 107 102 100   CO2 mmol/L 25.0 26.0 26.0   BUN mg/dL 9 17 14   CREATININE mg/dL 0.49* 0.67 0.65   CALCIUM mg/dL 8.1* 8.9 9.9   BILIRUBIN mg/dL 0.5 0.3 0.3   ALK PHOS U/L 66  "89 94   ALT (SGPT) U/L 15 20 25   AST (SGOT) U/L 14 18 23   GLUCOSE mg/dL 93 125* 107*       Culture Data:   No results found for: \"BLOODCX\", \"URINECX\", \"WOUNDCX\", \"MRSACX\", \"RESPCX\", \"STOOLCX\"    Radiology Data:   Imaging Results (Last 24 Hours)       Procedure Component Value Units Date/Time    CT Abdomen Pelvis Without Contrast [[id number]] Collected: 03/02/25 1303     Updated: 03/02/25 1314    Narrative:      EXAM/TECHNIQUE: CT abdomen and pelvis without contrast     INDICATION: SBO; K56.609-Unspecified intestinal obstruction, unspecified  as to partial versus complete obstruction; R11.2-Nausea with vomiting,  unspecified; K59.00-Constipation, unspecified     COMPARISON: 3/1/2025     DLP: 995.61 mGy.cm. Automated exposure control was utilized to decrease  patient radiation dose.     FINDINGS:     Patchy atelectasis in both lower lungs.     Unenhanced liver is unremarkable. Vicarious excretion of contrast in the  gallbladder lumen. No gallstone or biliary ductal dilatation. Unenhanced  pancreas, spleen, and adrenal glands are unremarkable. No urolithiasis  or hydronephrosis. No focal urinary bladder abnormality.     Oral contrast has progressed the entire small bowel and is now in the  colon. No colonic wall thickening or pericolonic fat stranding. Prior  appendectomy. There is persistent diffuse dilatation of the mid to  distal small bowel with abrupt transition point in the right pelvis on  images 41 through 44. At the site of transition, there is a long segment  of small bowel which demonstrates marked wall thickening and pronounced  surrounding fat stranding which extends into the adjacent mesentery  (images 40 through 60). There is significant interval increase in free  intraperitoneal fluid/ascites now surrounding the liver and tracking  along both paracolic gutters and into the pelvis. No pneumoperitoneum,  portal venous gas, or pneumatosis.     Uterus and adnexa appear unremarkable. No pelvic mass. " Nonaneurysmal  atherosclerotic abdominal aorta. No enlarged abdominal or pelvic lymph  nodes.     No acute abdominal wall soft tissue abnormality. No acute osseous  finding.       Impression:         1.  Low-grade/partial small bowel obstruction with transition point in  the right pelvis at the site of a markedly inflamed long segment of  small bowel which has marked wall thickening and surrounding fat  stranding which extends into the adjacent mesentery. I suspect this is  related to either infectious or inflammatory small bowel disease but  ischemic etiology is not excluded on this noncontrast exam. No  pneumatosis or free air. Oral contrast has progressed through the entire  small bowel, progressed through the transition point, and reached the  colon at the time of imaging.     2.  Significant increase in volume of free intraperitoneal fluid.        This report was signed and finalized on 3/2/2025 1:10 PM by Dr. Yovany Madison MD.               I have reviewed the patient's current medications.     Assessment/Plan   Assessment  Active Hospital Problems    Diagnosis     **SBO (small bowel obstruction)     GERD (gastroesophageal reflux disease)     Leukocytosis     Anxiety        Treatment Plan  #1 ? SBO -patient presenting with nausea and vomiting concern initial CT for possible SBO.  This was a noncontrast study.  There was significant constipation on that study and an enema was attempted in the ER without success.  NG tube was placed and patient was admitted.  Yesterday went for repeat CT with Gastrografin p.o. and contrast media through the colon.  She has had multiple BMs since and seems be doing better.  CAT scan did have some concern for possible infectious or inflammatory enteritis.  Inflammatory markers were negative.  Doing better today.  On liquid diet will advance.  Monitor closely.    #2 leukocytosis -WBC jumped from 11 to 23K yesterday.  CAT scan questioned possible CAT scan question possible small  bowel infection.  She was started on Zosyn.  White count has normalized again this morning.  Unclear if this was truly infection or stress response.  Has been afebrile.  Lactic acid was normal.  Inflammatory markers normal.  Will complete Zosyn today and change to p.o. Augmentin tomorrow for 5 more days to complete 7 days of antibiotics for possible small bowel infection/enteritis.    #3 GERD -IV PPI, transition to oral    #4 anxiety -back on Pristiq    #5 constipation -multiple BMs post CT with oral contrast.  Continue no regimen.      Medical Decision Making  Number and Complexity of problems: 2-3 acute, multiple chronic  Differential Diagnosis: As above    Conditions and Status       Seems to be improving.  Not yet at goal.  Advance diet.     MDM Data  External documents reviewed: none  Cardiac tracing (EKG, telemetry) interpretation: none  Radiology interpretation: ct report reviewed  Labs reviewed: as above  Any tests that were considered but not ordered: none     Decision rules/scores evaluated (example [id number], Wells, etc): none     Discussed with: patient, nursing, Dr. Garza     Care Planning  Shared decision making: Patient apprised of current labs, vitals, imaging and treatment plan.  They are agreeable with proceeding with plans as discussed.    Code status and discussions: full code    Disposition  Social Determinants of Health that impact treatment or disposition: none  I expect the patient to be discharged to home as early as tomorrow.        Electronically signed by Shay Navarro DO, 03/03/25, 11:49 CST.

## 2025-03-04 VITALS
BODY MASS INDEX: 31.45 KG/M2 | HEART RATE: 94 BPM | TEMPERATURE: 97.8 F | HEIGHT: 68 IN | DIASTOLIC BLOOD PRESSURE: 66 MMHG | WEIGHT: 207.5 LBS | OXYGEN SATURATION: 94 % | RESPIRATION RATE: 18 BRPM | SYSTOLIC BLOOD PRESSURE: 127 MMHG

## 2025-03-04 LAB
ANION GAP SERPL CALCULATED.3IONS-SCNC: 10 MMOL/L (ref 5–15)
BUN SERPL-MCNC: 7 MG/DL (ref 6–20)
BUN/CREAT SERPL: 13.5 (ref 7–25)
CALCIUM SPEC-SCNC: 8.7 MG/DL (ref 8.6–10.5)
CHLORIDE SERPL-SCNC: 107 MMOL/L (ref 98–107)
CO2 SERPL-SCNC: 26 MMOL/L (ref 22–29)
CREAT SERPL-MCNC: 0.52 MG/DL (ref 0.57–1)
EGFRCR SERPLBLD CKD-EPI 2021: 109.2 ML/MIN/1.73
GLUCOSE SERPL-MCNC: 99 MG/DL (ref 65–99)
POTASSIUM SERPL-SCNC: 3.5 MMOL/L (ref 3.5–5.2)
SODIUM SERPL-SCNC: 143 MMOL/L (ref 136–145)

## 2025-03-04 PROCEDURE — 80048 BASIC METABOLIC PNL TOTAL CA: CPT | Performed by: INTERNAL MEDICINE

## 2025-03-04 RX ORDER — DOCUSATE SODIUM 100 MG/1
100 CAPSULE, LIQUID FILLED ORAL 2 TIMES DAILY
Start: 2025-03-04

## 2025-03-04 RX ORDER — POLYETHYLENE GLYCOL 3350 17 G/17G
17 POWDER, FOR SOLUTION ORAL DAILY PRN
Start: 2025-03-04 | End: 2025-03-04 | Stop reason: HOSPADM

## 2025-03-04 RX ORDER — POTASSIUM CHLORIDE 1500 MG/1
40 TABLET, EXTENDED RELEASE ORAL EVERY 4 HOURS
Status: COMPLETED | OUTPATIENT
Start: 2025-03-04 | End: 2025-03-04

## 2025-03-04 RX ADMIN — POTASSIUM CHLORIDE 40 MEQ: 1500 TABLET, EXTENDED RELEASE ORAL at 11:05

## 2025-03-04 RX ADMIN — DESVENLAFAXINE SUCCINATE 50 MG: 50 TABLET, EXTENDED RELEASE ORAL at 08:26

## 2025-03-04 RX ADMIN — POTASSIUM CHLORIDE 40 MEQ: 1500 TABLET, EXTENDED RELEASE ORAL at 08:26

## 2025-03-04 RX ADMIN — AMOXICILLIN AND CLAVULANATE POTASSIUM 1 TABLET: 875; 125 TABLET, FILM COATED ORAL at 08:26

## 2025-03-04 RX ADMIN — Medication 10 ML: at 08:26

## 2025-03-04 RX ADMIN — FAMOTIDINE 20 MG: 20 TABLET, FILM COATED ORAL at 08:26

## 2025-03-04 NOTE — PLAN OF CARE
Goal Outcome Evaluation:  Plan of Care Reviewed With: patient        Progress: improving     A/o x 4. RA. Up ad minh. Voiding. ABX infusing per orders. Tolerating diet. Denies pain/ nausea. VSS. Safety maintained.

## 2025-03-04 NOTE — DISCHARGE SUMMARY
[facility]  DISCHARGE SUMMARY       Date of Admission: 3/1/2025  Date of Discharge:  3/4/2025  Primary Care Physician: Lizett Ruffin MD    Presenting Problem/History of Present Illness:  Vomiting, abdominal pain    Final Discharge Diagnoses:  Active Hospital Problems    Diagnosis     **SBO (small bowel obstruction)     GERD (gastroesophageal reflux disease)     Leukocytosis     Anxiety        Consults:   Dr. Garza, general surgery    Procedures Performed: none    Pertinent Test Results:       Imaging Results (All)       Procedure Component Value Units Date/Time    CT Abdomen Pelvis Without Contrast [[id number]] Collected: 03/02/25 1303     Updated: 03/02/25 1314    Narrative:      EXAM/TECHNIQUE: CT abdomen and pelvis without contrast     INDICATION: SBO; K56.609-Unspecified intestinal obstruction, unspecified  as to partial versus complete obstruction; R11.2-Nausea with vomiting,  unspecified; K59.00-Constipation, unspecified     COMPARISON: 3/1/2025     DLP: 995.61 mGy.cm. Automated exposure control was utilized to decrease  patient radiation dose.     FINDINGS:     Patchy atelectasis in both lower lungs.     Unenhanced liver is unremarkable. Vicarious excretion of contrast in the  gallbladder lumen. No gallstone or biliary ductal dilatation. Unenhanced  pancreas, spleen, and adrenal glands are unremarkable. No urolithiasis  or hydronephrosis. No focal urinary bladder abnormality.     Oral contrast has progressed the entire small bowel and is now in the  colon. No colonic wall thickening or pericolonic fat stranding. Prior  appendectomy. There is persistent diffuse dilatation of the mid to  distal small bowel with abrupt transition point in the right pelvis on  images 41 through 44. At the site of transition, there is a long segment  of small bowel which demonstrates marked wall thickening and pronounced  surrounding fat stranding which extends into the  adjacent mesentery  (images 40 through 60). There is significant interval increase in free  intraperitoneal fluid/ascites now surrounding the liver and tracking  along both paracolic gutters and into the pelvis. No pneumoperitoneum,  portal venous gas, or pneumatosis.     Uterus and adnexa appear unremarkable. No pelvic mass. Nonaneurysmal  atherosclerotic abdominal aorta. No enlarged abdominal or pelvic lymph  nodes.     No acute abdominal wall soft tissue abnormality. No acute osseous  finding.       Impression:         1.  Low-grade/partial small bowel obstruction with transition point in  the right pelvis at the site of a markedly inflamed long segment of  small bowel which has marked wall thickening and surrounding fat  stranding which extends into the adjacent mesentery. I suspect this is  related to either infectious or inflammatory small bowel disease but  ischemic etiology is not excluded on this noncontrast exam. No  pneumatosis or free air. Oral contrast has progressed through the entire  small bowel, progressed through the transition point, and reached the  colon at the time of imaging.     2.  Significant increase in volume of free intraperitoneal fluid.        This report was signed and finalized on 3/2/2025 1:10 PM by Dr. Yovany Madison MD.       XR Abdomen KUB [[id number]] Collected: 03/02/25 0339     Updated: 03/02/25 0342    Narrative:      EXAM/TECHNIQUE: XR ABDOMEN KUB-     INDICATION: NG tube insertion; K56.609-Unspecified intestinal  obstruction, unspecified as to partial versus complete obstruction;  R11.2-Nausea with vomiting, unspecified; K59.00-Constipation,  unspecified     COMPARISON: CT 3/1/2025       Impression:      Findings/impression:     Enteric tube tip is in the proximal to mid stomach.     This report was signed and finalized on 3/2/2025 3:39 AM by Dr. Yovany Madison MD.       CT Abdomen Pelvis With Contrast [[id number]] Collected: 03/01/25 2521     Updated: 03/01/25 0421     Narrative:      EXAMINATION: CT ABDOMEN PELVIS W CONTRAST-  3/1/2025 5:37 PM     HISTORY: Epigastric and RUQ abdominal pain, sudden onset at noon today  after eating     COMPARISON: 10/25/2022     DLP: 820.02 mGy.cm     In order to have a CT radiation dose as low as reasonably achievable,  Automated Exposure Control was utilized for adjustment of the mA and/or  KV according to patient size.     TECHNIQUE: Following the intravenous administration of contrast, helical  CT tomographic images of the abdomen and pelvis were acquired. Coronal  and sagittal reformatted images were also provided for review. No oral  contrast.     FINDINGS:  I suspect that there is a breast implant at the lower aspect of the LEFT  breast which is partially included on this examination. I don't see  acute findings in the lower thorax. The heart is not enlarged. Small  hiatal hernia.     Liver, gallbladder, pancreas, spleen, adrenal glands, bilateral kidneys,  and bilateral ureters are without acute findings.     No free intraperitoneal air. No retroperitoneal mass, adenopathy, or  hemorrhage.     Stool noted in the colon. Findings consistent with mild fecal stasis.  Previous appendectomy changes noted. Some high density material in the  stomach may represent ingested gastric contents but are nonspecific.  There are loops of distended small bowel in the upper abdomen. Air-fluid  levels in distended loops of small bowel in the upper abdomen. Distal  small bowel is largely decompressed. I am suspicious that there may be a  transition point (series 3, image 15) in the upper abdomen at midline  from mildly distended to nondistended loops of small bowel and a small  bowel obstruction should be considered.     No free fluid or free air in the abdomen. Atherosclerotic vascular  disease in the aorta. No aneurysmal dilation.     Surrounding soft tissue structures are without acute findings. Osseous  structures are also without acute findings. No pelvic  mass or  adenopathy. Urinary bladder is decompressed. Incidental note of chronic  tear of the LEFT rectus femoris, proximally. Degenerative changes in the  spine with grade 1 anterolisthesis of L4 on L5, likely degenerative.          Impression:         1.  I am suspicious that there are early changes of small bowel  obstruction with transition point in the RIGHT mid to upper abdomen.  Decompression of distal small bowel noted. Mild distention of proximal  small bowel loops with maximum diameter of approximately 2.5 cm.     2.  Moderate fecal stasis.      This report was signed and finalized on 3/1/2025 5:48 PM by Dr. Nasim Durán MD.             LAB RESULTS:      Lab 03/03/25  0905 03/02/25  1337 03/02/25  1203 03/02/25  0525 03/01/25  1658   WBC 10.65  --   --  23.21* 11.79*   HEMOGLOBIN 13.5  --   --  15.8 15.5   HEMATOCRIT 43.7  --   --  49.2* 48.0*   PLATELETS 293  --   --  321 341   NEUTROS ABS 7.64*  --   --  21.12* 8.03*   IMMATURE GRANS (ABS) 0.04  --   --   --  0.06*   LYMPHS ABS 1.86  --   --   --  2.60   MONOS ABS 0.97*  --   --   --  0.93*   EOS ABS 0.09  --   --  0.00 0.12   MCV 93.0  --   --  92.3 90.2   SED RATE  --  20  --   --   --    CRP  --   --   --  0.31  --    PROCALCITONIN  --   --   --  0.07  --    LACTATE  --   --  1.5  --   --          Lab 03/04/25  0526 03/03/25  0421 03/02/25  0525 03/01/25  1658   SODIUM 143 141 138 140   POTASSIUM 3.5 4.0 4.0 4.1   CHLORIDE 107 107 102 100   CO2 26.0 25.0 26.0 26.0   ANION GAP 10.0 9.0 10.0 14.0   BUN 7 9 17 14   CREATININE 0.52* 0.49* 0.67 0.65   EGFR 109.2 110.8 102.7 103.5   GLUCOSE 99 93 125* 107*   CALCIUM 8.7 8.1* 8.9 9.9   MAGNESIUM  --  2.0 1.9  --    PHOSPHORUS  --  2.7 4.4  --          Lab 03/03/25  0421 03/02/25  0525 03/01/25  1658   TOTAL PROTEIN 6.0 7.1 8.3   ALBUMIN 3.0* 3.6 4.3   GLOBULIN 3.0 3.5 4.0   ALT (SGPT) 15 20 25   AST (SGOT) 14 18 23   BILIRUBIN 0.5 0.3 0.3   ALK PHOS 66 89 94   LIPASE  --  41 73*                     Brief  Urine Lab Results  (Last result in the past 365 days)        Color   Clarity   Blood   Leuk Est   Nitrite   Protein   CREAT   Urine HCG        03/01/25 1728 Yellow   Clear   Negative   Small (1+)   Negative   Negative                 Microbiology Results (last 10 days)       Procedure Component Value - Date/Time    Blood Culture - Blood, Arm, Right [[id number]]  (Normal) Collected: 03/02/25 1538    Lab Status: Preliminary result Specimen: Blood from Arm, Right Updated: 03/03/25 1600     Blood Culture No growth at 24 hours    Blood Culture - Blood, Arm, Left [[id number]]  (Normal) Collected: 03/02/25 1538    Lab Status: Preliminary result Specimen: Blood from Arm, Left Updated: 03/03/25 1600     Blood Culture No growth at 24 hours            Hospital Course:   54-year-old female who presented to the ER with severe abdominal pain, nausea, vomiting.  No recent antibiotics or diarrhea.  Due to persistence of symptoms she sought help.  CT of the abdomen pelvis in the ER was suspicious for possible early SBO but was also noted to have a decent amount of stool burden.  General surgery was called from the ER.  Recommendations for attempt at enema and decompression.  If that works she can get a bowel regimen and go home but unfortunately there is no resolution and patient was admitted for further monitoring and care.  On 3/2 she went for CT abdomen pelvis with p.o. contrast which was the other recommendation from general surgery to the ER that was not done.  With the Gastrografin the contrast tracked all the way through the colon without obvious signs of obstruction.  It also allowed the patient started having BMs and feel better.  For some reason on the day after admission patient's white count jumped from 11-23,000.  No fevers.  Second CAT scan did question an area in the small bowel of inflammation and infection.  Inflammatory markers were negative.  She was started on Zosyn.  White count promptly returned back to normal  "the next day.  Unclear if this was reactive or spurious or infection.  Because this time she is doing well.  Diet has been advanced.  No further abdominal discomfort or vomiting.  She be transition to Augmentin for 5 more days to complete 7 days of antibiotics.  She can follow-up with PCP in the outpatient setting.  Further follow-up with GI as needed.  Patient reports she had scopes not too long ago.  Seek medical evaluation for any return or worsening of symptoms.      Physical Exam on Discharge:  /66 (BP Location: Left arm, Patient Position: Lying)   Pulse 94   Temp 97.8 °F (36.6 °C) (Oral)   Resp 18   Ht 172.7 cm (68\")   Wt 94.1 kg (207 lb 8 oz)   LMP 09/26/2016   SpO2 94%   BMI 31.55 kg/m²   Physical Exam  GEN: Awake, alert, interactive, in NAD  HEENT:  PERRLA, EOMI, anicteric, +NGT  Lungs: CTAB, no wheezing/rales/rhonchi  Heart: RRR, +S1/s2, no rub  ABD: soft, no guarding or rebound, +BS  Extremities: atraumatic, no cyanosis, no edema  Skin: no rashes or lesions  Neuro: AAOx3, no focal deficits  Psych: normal mood & affect    Condition on Discharge: stable/improved    Discharge Disposition:  Home or Self Care    Discharge Medications:     Discharge Medications        New Medications        Instructions Start Date   amoxicillin-clavulanate 875-125 MG per tablet  Commonly known as: AUGMENTIN   1 tablet, Oral, Every 12 Hours Scheduled      docusate sodium 100 MG capsule  Commonly known as: Colace   100 mg, Oral, 2 Times Daily             Continue These Medications        Instructions Start Date   cetirizine 10 MG tablet  Commonly known as: zyrTEC   10 mg, Daily      desvenlafaxine 100 MG 24 hr tablet  Commonly known as: PRISTIQ   100 mg, Oral, Daily      VITEYES AREDS ADVANCED PO   1 tablet, Daily                 Discharge Diet:   As prior    Activity at Discharge:   As prior    Follow-up Appointments:   No future appointments.    Test Results Pending at Discharge: none    Electronically signed by " Shay Navarro DO, 03/04/25, 09:03 CST.    Time: 34 minutes.

## 2025-03-05 NOTE — PAYOR COMM NOTE
"[place] [facility] 3-4-25    Cruz Jefferson (54 y.o. Female)       Date of Birth   1970    Social Security Number       Address   [address] [facility] [address]    Home Phone   [phone]    MRN   [id number]       Christianity   Other    Marital Status                               Admission Date   3/1/25    Admission Type   Emergency    Admitting Provider   Shay Navarro DO    Attending Provider       Department, Room/Bed   [facility] 3C, 371/1       Discharge Date   3/4/2025    Discharge Disposition   Home or Self Care    Discharge Destination                                 Attending Provider: (none)   Allergies: No Known Allergies    Isolation: None   Infection: None   Code Status: Prior    Ht: 172.7 cm (68\")   Wt: 94.1 kg (207 lb 8 oz)    Admission Cmt: None   Principal Problem: SBO (small bowel obstruction) [K56.609]                   Active Insurance as of 3/1/2025       Primary Coverage       Payor Plan Insurance Group Employer/Plan Group    C[employer] CIGNA [id number]       Payor Plan Address Payor Plan Phone Number Payor Plan Fax Number Effective Dates    [address] [phone]  1/1/2021 - None Entered    [facility] [id number]         Subscriber Name Subscriber Birth Date Member ID       CRUZ JEFFERSON 1970 [id number]                     Emergency Contacts        (Rel.) Home Phone Work Phone Mobile Phone    Natasha Mena (Sister) [phone] -- --                 Discharge Summary        Shay Navarro DO at 03/04/25 0903                [facility] Medicine Services  DISCHARGE SUMMARY       Date of Admission: 3/1/2025  Date of Discharge:  3/4/2025  Primary Care Physician: Lizett Ruffin MD    Presenting Problem/History of Present Illness:  Vomiting, abdominal pain    Final Discharge Diagnoses:  Active Hospital Problems    Diagnosis     **SBO (small bowel obstruction)     GERD (gastroesophageal reflux disease)     " Leukocytosis     Anxiety        Consults:   Dr. Garza, general surgery    Procedures Performed: none    Pertinent Test Results:       Imaging Results (All)       Procedure Component Value Units Date/Time    CT Abdomen Pelvis Without Contrast [[id number]] Collected: 03/02/25 1303     Updated: 03/02/25 1314    Narrative:      EXAM/TECHNIQUE: CT abdomen and pelvis without contrast     INDICATION: SBO; K56.609-Unspecified intestinal obstruction, unspecified  as to partial versus complete obstruction; R11.2-Nausea with vomiting,  unspecified; K59.00-Constipation, unspecified     COMPARISON: 3/1/2025     DLP: 995.61 mGy.cm. Automated exposure control was utilized to decrease  patient radiation dose.     FINDINGS:     Patchy atelectasis in both lower lungs.     Unenhanced liver is unremarkable. Vicarious excretion of contrast in the  gallbladder lumen. No gallstone or biliary ductal dilatation. Unenhanced  pancreas, spleen, and adrenal glands are unremarkable. No urolithiasis  or hydronephrosis. No focal urinary bladder abnormality.     Oral contrast has progressed the entire small bowel and is now in the  colon. No colonic wall thickening or pericolonic fat stranding. Prior  appendectomy. There is persistent diffuse dilatation of the mid to  distal small bowel with abrupt transition point in the right pelvis on  images 41 through 44. At the site of transition, there is a long segment  of small bowel which demonstrates marked wall thickening and pronounced  surrounding fat stranding which extends into the adjacent mesentery  (images 40 through 60). There is significant interval increase in free  intraperitoneal fluid/ascites now surrounding the liver and tracking  along both paracolic gutters and into the pelvis. No pneumoperitoneum,  portal venous gas, or pneumatosis.     Uterus and adnexa appear unremarkable. No pelvic mass. Nonaneurysmal  atherosclerotic abdominal aorta. No enlarged abdominal or pelvic lymph  nodes.      No acute abdominal wall soft tissue abnormality. No acute osseous  finding.       Impression:         1.  Low-grade/partial small bowel obstruction with transition point in  the right pelvis at the site of a markedly inflamed long segment of  small bowel which has marked wall thickening and surrounding fat  stranding which extends into the adjacent mesentery. I suspect this is  related to either infectious or inflammatory small bowel disease but  ischemic etiology is not excluded on this noncontrast exam. No  pneumatosis or free air. Oral contrast has progressed through the entire  small bowel, progressed through the transition point, and reached the  colon at the time of imaging.     2.  Significant increase in volume of free intraperitoneal fluid.        This report was signed and finalized on 3/2/2025 1:10 PM by Dr. Yovany Madison MD.       XR Abdomen KUB [[id number]] Collected: 03/02/25 0339     Updated: 03/02/25 0342    Narrative:      EXAM/TECHNIQUE: XR ABDOMEN KUB-     INDICATION: NG tube insertion; K56.609-Unspecified intestinal  obstruction, unspecified as to partial versus complete obstruction;  R11.2-Nausea with vomiting, unspecified; K59.00-Constipation,  unspecified     COMPARISON: CT 3/1/2025       Impression:      Findings/impression:     Enteric tube tip is in the proximal to mid stomach.     This report was signed and finalized on 3/2/2025 3:39 AM by Dr. Yovany Madison MD.       CT Abdomen Pelvis With Contrast [[id number]] Collected: 03/01/25 1737     Updated: 03/01/25 1751    Narrative:      EXAMINATION: CT ABDOMEN PELVIS W CONTRAST-  3/1/2025 5:37 PM     HISTORY: Epigastric and RUQ abdominal pain, sudden onset at noon today  after eating     COMPARISON: 10/25/2022     DLP: 820.02 mGy.cm     In order to have a CT radiation dose as low as reasonably achievable,  Automated Exposure Control was utilized for adjustment of the mA and/or  KV according to patient size.     TECHNIQUE: Following the  intravenous administration of contrast, helical  CT tomographic images of the abdomen and pelvis were acquired. Coronal  and sagittal reformatted images were also provided for review. No oral  contrast.     FINDINGS:  I suspect that there is a breast implant at the lower aspect of the LEFT  breast which is partially included on this examination. I don't see  acute findings in the lower thorax. The heart is not enlarged. Small  hiatal hernia.     Liver, gallbladder, pancreas, spleen, adrenal glands, bilateral kidneys,  and bilateral ureters are without acute findings.     No free intraperitoneal air. No retroperitoneal mass, adenopathy, or  hemorrhage.     Stool noted in the colon. Findings consistent with mild fecal stasis.  Previous appendectomy changes noted. Some high density material in the  stomach may represent ingested gastric contents but are nonspecific.  There are loops of distended small bowel in the upper abdomen. Air-fluid  levels in distended loops of small bowel in the upper abdomen. Distal  small bowel is largely decompressed. I am suspicious that there may be a  transition point (series 3, image 15) in the upper abdomen at midline  from mildly distended to nondistended loops of small bowel and a small  bowel obstruction should be considered.     No free fluid or free air in the abdomen. Atherosclerotic vascular  disease in the aorta. No aneurysmal dilation.     Surrounding soft tissue structures are without acute findings. Osseous  structures are also without acute findings. No pelvic mass or  adenopathy. Urinary bladder is decompressed. Incidental note of chronic  tear of the LEFT rectus femoris, proximally. Degenerative changes in the  spine with grade 1 anterolisthesis of L4 on L5, likely degenerative.          Impression:         1.  I am suspicious that there are early changes of small bowel  obstruction with transition point in the RIGHT mid to upper abdomen.  Decompression of distal small  bowel noted. Mild distention of proximal  small bowel loops with maximum diameter of approximately 2.5 cm.     2.  Moderate fecal stasis.      This report was signed and finalized on 3/1/2025 5:48 PM by Dr. Nasim Durán MD.             LAB RESULTS:      Lab 03/03/25  0905 03/02/25  1337 03/02/25  1203 03/02/25  0525 03/01/25  1658   WBC 10.65  --   --  23.21* 11.79*   HEMOGLOBIN 13.5  --   --  15.8 15.5   HEMATOCRIT 43.7  --   --  49.2* 48.0*   PLATELETS 293  --   --  321 341   NEUTROS ABS 7.64*  --   --  21.12* 8.03*   IMMATURE GRANS (ABS) 0.04  --   --   --  0.06*   LYMPHS ABS 1.86  --   --   --  2.60   MONOS ABS 0.97*  --   --   --  0.93*   EOS ABS 0.09  --   --  0.00 0.12   MCV 93.0  --   --  92.3 90.2   SED RATE  --  20  --   --   --    CRP  --   --   --  0.31  --    PROCALCITONIN  --   --   --  0.07  --    LACTATE  --   --  1.5  --   --          Lab 03/04/25 0526 03/03/25 0421 03/02/25 0525 03/01/25  1658   SODIUM 143 141 138 140   POTASSIUM 3.5 4.0 4.0 4.1   CHLORIDE 107 107 102 100   CO2 26.0 25.0 26.0 26.0   ANION GAP 10.0 9.0 10.0 14.0   BUN 7 9 17 14   CREATININE 0.52* 0.49* 0.67 0.65   EGFR 109.2 110.8 102.7 103.5   GLUCOSE 99 93 125* 107*   CALCIUM 8.7 8.1* 8.9 9.9   MAGNESIUM  --  2.0 1.9  --    PHOSPHORUS  --  2.7 4.4  --          Lab 03/03/25 0421 03/02/25 0525 03/01/25  1658   TOTAL PROTEIN 6.0 7.1 8.3   ALBUMIN 3.0* 3.6 4.3   GLOBULIN 3.0 3.5 4.0   ALT (SGPT) 15 20 25   AST (SGOT) 14 18 23   BILIRUBIN 0.5 0.3 0.3   ALK PHOS 66 89 94   LIPASE  --  41 73*                     Brief Urine Lab Results  (Last result in the past 365 days)        Color   Clarity   Blood   Leuk Est   Nitrite   Protein   CREAT   Urine HCG        03/01/25 1728 Yellow   Clear   Negative   Small (1+)   Negative   Negative                 Microbiology Results (last 10 days)       Procedure Component Value - Date/Time    Blood Culture - Blood, Arm, Right [[id number]]  (Normal) Collected: 03/02/25 1538    Lab Status:  Preliminary result Specimen: Blood from Arm, Right Updated: 03/03/25 1600     Blood Culture No growth at 24 hours    Blood Culture - Blood, Arm, Left [[id number]]  (Normal) Collected: 03/02/25 1538    Lab Status: Preliminary result Specimen: Blood from Arm, Left Updated: 03/03/25 1600     Blood Culture No growth at 24 hours            Hospital Course:   54-year-old female who presented to the ER with severe abdominal pain, nausea, vomiting.  No recent antibiotics or diarrhea.  Due to persistence of symptoms she sought help.  CT of the abdomen pelvis in the ER was suspicious for possible early SBO but was also noted to have a decent amount of stool burden.  General surgery was called from the ER.  Recommendations for attempt at enema and decompression.  If that works she can get a bowel regimen and go home but unfortunately there is no resolution and patient was admitted for further monitoring and care.  On 3/2 she went for CT abdomen pelvis with p.o. contrast which was the other recommendation from general surgery to the ER that was not done.  With the Gastrografin the contrast tracked all the way through the colon without obvious signs of obstruction.  It also allowed the patient started having BMs and feel better.  For some reason on the day after admission patient's white count jumped from 11-23,000.  No fevers.  Second CAT scan did question an area in the small bowel of inflammation and infection.  Inflammatory markers were negative.  She was started on Zosyn.  White count promptly returned back to normal the next day.  Unclear if this was reactive or spurious or infection.  Because this time she is doing well.  Diet has been advanced.  No further abdominal discomfort or vomiting.  She be transition to Augmentin for 5 more days to complete 7 days of antibiotics.  She can follow-up with PCP in the outpatient setting.  Further follow-up with GI as needed.  Patient reports she had scopes not too long ago.  Seek  "medical evaluation for any return or worsening of symptoms.      Physical Exam on Discharge:  /66 (BP Location: Left arm, Patient Position: Lying)   Pulse 94   Temp 97.8 °F (36.6 °C) (Oral)   Resp 18   Ht 172.7 cm (68\")   Wt 94.1 kg (207 lb 8 oz)   LMP 09/26/2016   SpO2 94%   BMI 31.55 kg/m²   Physical Exam  GEN: Awake, alert, interactive, in NAD  HEENT:  PERRLA, EOMI, anicteric, +NGT  Lungs: CTAB, no wheezing/rales/rhonchi  Heart: RRR, +S1/s2, no rub  ABD: soft, no guarding or rebound, +BS  Extremities: atraumatic, no cyanosis, no edema  Skin: no rashes or lesions  Neuro: AAOx3, no focal deficits  Psych: normal mood & affect    Condition on Discharge: stable/improved    Discharge Disposition:  Home or Self Care    Discharge Medications:     Discharge Medications        New Medications        Instructions Start Date   amoxicillin-clavulanate 875-125 MG per tablet  Commonly known as: AUGMENTIN   1 tablet, Oral, Every 12 Hours Scheduled      docusate sodium 100 MG capsule  Commonly known as: Colace   100 mg, Oral, 2 Times Daily             Continue These Medications        Instructions Start Date   cetirizine 10 MG tablet  Commonly known as: zyrTEC   10 mg, Daily      desvenlafaxine 100 MG 24 hr tablet  Commonly known as: PRISTIQ   100 mg, Oral, Daily      VITEYES AREDS ADVANCED PO   1 tablet, Daily                 Discharge Diet:   As prior    Activity at Discharge:   As prior    Follow-up Appointments:   No future appointments.    Test Results Pending at Discharge: none    Electronically signed by Shay Navarro DO, 03/04/25, 09:03 CST.    Time: 34 minutes.           Electronically signed by Shay Navarro DO at 03/04/25 1722       "

## 2025-03-06 ENCOUNTER — TELEPHONE (OUTPATIENT)
Dept: PRIMARY CARE CLINIC | Age: 55
End: 2025-03-06

## 2025-03-06 NOTE — TELEPHONE ENCOUNTER
Care Transitions Initial Follow Up Call    Outreach made within 2 business days of discharge: Yes    Patient: Rosalba Tirado   Patient : 1970   MRN: [id number]   Reason for Admission: Abdominal pain, SBO  Discharge Date: 05       Spoke with: Rosalba    Discharge department/facility: [facility] Interactive Patient Contact:  Was patient able to fill all prescriptions: Yes  Was patient instructed to bring all medications to the follow-up visit: Yes  Is patient taking all medications as directed in the discharge summary? Yes  Does patient understand their discharge instructions: Yes  Does patient have questions or concerns that need addressed prior to 7-14 day follow up office visit: no    Additional needs identified to be addressed with provider  No needs identified             Scheduled appointment with PCP within 7-14 days    Spoke with Rosalba. She states she is feeling pretty well. She has some abdominal tenderness. She is having bowel movements. She denies any fever or chills. She had a headache that has resolved after she had some caffeine. She voices no needs or concerns at this time.     Follow Up  Future Appointments   Date Time Provider Department Center   3/7/2025 12:45 PM Sofia Donaldson MD [facility]   2025  3:00 PM [facility]       Yumiko Matute MA

## 2025-03-07 ENCOUNTER — OFFICE VISIT (OUTPATIENT)
Dept: INTERNAL MEDICINE | Age: 55
End: 2025-03-07

## 2025-03-07 VITALS
HEART RATE: 89 BPM | BODY MASS INDEX: 30.89 KG/M2 | OXYGEN SATURATION: 97 % | HEIGHT: 68 IN | WEIGHT: 203.8 LBS | DIASTOLIC BLOOD PRESSURE: 78 MMHG | SYSTOLIC BLOOD PRESSURE: 122 MMHG

## 2025-03-07 DIAGNOSIS — K52.9 ILEITIS: ICD-10-CM

## 2025-03-07 DIAGNOSIS — R79.89 ABNORMAL CBC: ICD-10-CM

## 2025-03-07 DIAGNOSIS — K56.609 SBO (SMALL BOWEL OBSTRUCTION) (HCC): Primary | ICD-10-CM

## 2025-03-07 LAB
BACTERIA SPEC AEROBE CULT: NORMAL
BACTERIA SPEC AEROBE CULT: NORMAL

## 2025-03-07 ASSESSMENT — ENCOUNTER SYMPTOMS
ABDOMINAL PAIN: 0
CONSTIPATION: 0
CHEST TIGHTNESS: 0
SORE THROAT: 0
COUGH: 0
WHEEZING: 0

## 2025-03-07 NOTE — PROGRESS NOTES
Hospital Follow-up Visit      Rosalba Tirado   YOB: 1970    Date of Visit:  3/7/2025    Vitals:    03/07/25 1242   BP: 122/78   Pulse: 89   SpO2: 97%   Weight: 92.4 kg (203 lb 12.8 oz)   Height: 1.727 m (5' 8\")     Body mass index is 30.99 kg/m².     Wt Readings from Last 3 Encounters:   03/07/25 92.4 kg (203 lb 12.8 oz)   01/28/25 93.4 kg (206 lb)   12/31/24 91.6 kg (202 lb)     BP Readings from Last 3 Encounters:   03/07/25 122/78   01/28/25 117/78   12/11/24 120/76       No Known Allergies  Outpatient Medications Marked as Taking for the 3/7/25 encounter (Office Visit) with Sofia Donaldson MD   Medication Sig Dispense Refill    desvenlafaxine succinate (PRISTIQ) 100 MG TB24 extended release tablet TAKE 1 TABLET BY MOUTH DAILY 90 tablet 1    valACYclovir (VALTREX) 1 g tablet Take 1 tablet by mouth in the morning and at bedtime Take one tablet twice daily at on set of first fever blister and second day one tablet twice daily. 8 tablet 0    albuterol sulfate HFA (VENTOLIN HFA) 108 (90 Base) MCG/ACT inhaler Inhale 2 puffs into the lungs every 6 hours as needed for Wheezing 1 each 3    cetirizine (ZYRTEC) 10 MG tablet Take 1 tablet by mouth daily      fluticasone (FLONASE) 50 MCG/ACT nasal spray 2 sprays by Each Nostril route daily 1 Bottle 0         CC:  Patient presents for hospital discharge follow-upafter admission   Date of Admission: 3/1/2025  Date of Discharge: 3/4/2025     (following highlighted text has been copied from this specialist note)  Presenting Problem/History of Present Illness:  Vomiting, abdominal pain    Final Discharge Diagnoses:  Active Hospital Problems   Diagnosis   **SBO (small bowel obstruction)   GERD (gastroesophageal reflux disease)   Leukocytosis   Anxiety     Consults:   Dr. Hay, general surgery   Hospital Course:   54-year-old female who presented to the ER with severe abdominal pain, nausea, vomiting. No recent antibiotics or

## 2025-03-09 ENCOUNTER — PATIENT MESSAGE (OUTPATIENT)
Dept: INTERNAL MEDICINE | Age: 55
End: 2025-03-09

## 2025-03-09 DIAGNOSIS — F45.41 STRESS HEADACHE: Primary | ICD-10-CM

## 2025-03-10 DIAGNOSIS — K58.9 IRRITABLE BOWEL SYNDROME, UNSPECIFIED TYPE: ICD-10-CM

## 2025-03-10 DIAGNOSIS — K56.609 SBO (SMALL BOWEL OBSTRUCTION) (HCC): Primary | ICD-10-CM

## 2025-03-10 RX ORDER — BUTALBITAL, ACETAMINOPHEN AND CAFFEINE 50; 325; 40 MG/1; MG/1; MG/1
1 TABLET ORAL EVERY 12 HOURS PRN
Qty: 30 TABLET | Refills: 0 | Status: SHIPPED | OUTPATIENT
Start: 2025-03-10

## 2025-03-12 ENCOUNTER — OFFICE VISIT (OUTPATIENT)
Dept: GASTROENTEROLOGY | Age: 55
End: 2025-03-12
Payer: COMMERCIAL

## 2025-03-12 VITALS
WEIGHT: 207 LBS | SYSTOLIC BLOOD PRESSURE: 124 MMHG | DIASTOLIC BLOOD PRESSURE: 74 MMHG | OXYGEN SATURATION: 97 % | HEIGHT: 68 IN | BODY MASS INDEX: 31.37 KG/M2 | HEART RATE: 110 BPM

## 2025-03-12 DIAGNOSIS — R93.5 ABNORMAL CT OF THE ABDOMEN: ICD-10-CM

## 2025-03-12 DIAGNOSIS — R10.84 GENERALIZED ABDOMINAL PAIN: Primary | ICD-10-CM

## 2025-03-12 PROCEDURE — 3074F SYST BP LT 130 MM HG: CPT | Performed by: NURSE PRACTITIONER

## 2025-03-12 PROCEDURE — 99214 OFFICE O/P EST MOD 30 MIN: CPT | Performed by: NURSE PRACTITIONER

## 2025-03-12 PROCEDURE — 3078F DIAST BP <80 MM HG: CPT | Performed by: NURSE PRACTITIONER

## 2025-03-12 NOTE — PROGRESS NOTES
Subjective:     Patient ID: Rosalba Tirado is a 54 y.o. female  PCP: Sofia Donaldson MD  Referring Provider: Sofia Donaldson MD    HPI  Patient presents to the office today with the following complaints: Other (MALL BOWEL OBSTRUCTION,IBS)      History of Present Illness  The patient presents for evaluation of abdominal pain.    She experienced an acute onset of abdominal pain last Saturday, which necessitated a hospital visit by 1:30 PM due to the severity of her symptoms. During her hospital stay, she underwent two CT scans and blood work, which revealed inflammation but no significant obstruction. She was discharged on Tuesday morning. She has been experiencing severe flatulence for the past month, despite having regular bowel movements. She was informed of a minor backup in her system. Her bowel movements have been irregular this week, with the last one occurring on Monday. She reports no presence of blood in her stool. She has been adhering to a regimen of Colace twice daily as prescribed. Her appetite has been diminished, and she is uncertain about her dietary choices. She has been consuming a laxative at night and maintaining hydration primarily through water, with the exception of a morning cup of coffee. She has been advised against consuming salads and fiber-rich foods. Her current medication includes Cosentyx and Pristiq. She is not currently on any anticoagulant therapy.    Supplemental Information  She has had a headache since she got out of the hospital.    MEDICATIONS  Cosentyx, Pristiq, Colace      Last EGD 2-3 cm sm hh. (-)Sprue   Last Colonoscopy 12/13/2022 - sm erosions in areas of biopsies done from day previous, sm ulcerations wo bleeding at site of polypectomy, tortuous redundant colon, int hem Gr 1 likely recent blood from rectum but wo at time of exam, 5 year recall   Family hx colon cancer - Maternal Grandmother  Results  Imaging  CAT scan showed inflammation in the small bowel.    CT Result

## 2025-03-20 ASSESSMENT — ENCOUNTER SYMPTOMS
DIARRHEA: 0
RECTAL PAIN: 0
ABDOMINAL DISTENTION: 0
BLOOD IN STOOL: 0
ABDOMINAL PAIN: 1
VOMITING: 0
ANAL BLEEDING: 0
SHORTNESS OF BREATH: 0
COUGH: 0
CHOKING: 0
CONSTIPATION: 1
NAUSEA: 0
TROUBLE SWALLOWING: 0

## 2025-04-06 ENCOUNTER — PATIENT MESSAGE (OUTPATIENT)
Dept: INTERNAL MEDICINE | Age: 55
End: 2025-04-06

## 2025-04-14 NOTE — TELEPHONE ENCOUNTER
Rosalba G Brandon called to request a refill on her medication.      Last office visit : 3/7/2025   Next office visit : 5/27/2025     Requested Prescriptions     Pending Prescriptions Disp Refills    tirzepatide-weight management (ZEPBOUND) 2.5 MG/0.5ML SOLN subCUTAneous injection (VIAL) 2 mL 1     Sig: Inject 0.5 mLs into the skin once a week            Beata Flannery MA

## 2025-04-23 ENCOUNTER — APPOINTMENT (OUTPATIENT)
Dept: OPERATING ROOM | Age: 55
End: 2025-04-23
Attending: INTERNAL MEDICINE

## 2025-04-23 ENCOUNTER — ANESTHESIA (OUTPATIENT)
Dept: OPERATING ROOM | Age: 55
End: 2025-04-23

## 2025-04-23 ENCOUNTER — HOSPITAL ENCOUNTER (OUTPATIENT)
Age: 55
Setting detail: OUTPATIENT SURGERY
Discharge: HOME | End: 2025-04-23
Attending: INTERNAL MEDICINE | Admitting: INTERNAL MEDICINE
Payer: COMMERCIAL

## 2025-04-23 ENCOUNTER — ANESTHESIA EVENT (OUTPATIENT)
Dept: OPERATING ROOM | Age: 55
End: 2025-04-23

## 2025-04-23 VITALS
HEIGHT: 68 IN | TEMPERATURE: 97.3 F | WEIGHT: 207 LBS | DIASTOLIC BLOOD PRESSURE: 79 MMHG | SYSTOLIC BLOOD PRESSURE: 115 MMHG | BODY MASS INDEX: 31.37 KG/M2 | HEART RATE: 85 BPM | OXYGEN SATURATION: 100 % | RESPIRATION RATE: 16 BRPM

## 2025-04-23 PROCEDURE — G8907 PT DOC NO EVENTS ON DISCHARG: HCPCS

## 2025-04-23 PROCEDURE — 45378 DIAGNOSTIC COLONOSCOPY: CPT

## 2025-04-23 PROCEDURE — G8917 PT W IV AB NOT GIVEN ON TIME: HCPCS

## 2025-04-23 PROCEDURE — 45378 DIAGNOSTIC COLONOSCOPY: CPT | Performed by: INTERNAL MEDICINE

## 2025-04-23 RX ORDER — PROPOFOL 10 MG/ML
INJECTION, EMULSION INTRAVENOUS
Status: DISCONTINUED | OUTPATIENT
Start: 2025-04-23 | End: 2025-04-23 | Stop reason: SDUPTHER

## 2025-04-23 RX ORDER — SODIUM CHLORIDE, SODIUM LACTATE, POTASSIUM CHLORIDE, CALCIUM CHLORIDE 600; 310; 30; 20 MG/100ML; MG/100ML; MG/100ML; MG/100ML
INJECTION, SOLUTION INTRAVENOUS CONTINUOUS
Status: DISCONTINUED | OUTPATIENT
Start: 2025-04-23 | End: 2025-04-23 | Stop reason: HOSPADM

## 2025-04-23 RX ORDER — LIDOCAINE HYDROCHLORIDE 10 MG/ML
INJECTION, SOLUTION EPIDURAL; INFILTRATION; INTRACAUDAL; PERINEURAL
Status: DISCONTINUED | OUTPATIENT
Start: 2025-04-23 | End: 2025-04-23 | Stop reason: SDUPTHER

## 2025-04-23 RX ADMIN — SODIUM CHLORIDE, SODIUM LACTATE, POTASSIUM CHLORIDE, CALCIUM CHLORIDE: 600; 310; 30; 20 INJECTION, SOLUTION INTRAVENOUS at 07:37

## 2025-04-23 RX ADMIN — PROPOFOL 30 MG: 10 INJECTION, EMULSION INTRAVENOUS at 09:40

## 2025-04-23 RX ADMIN — LIDOCAINE HYDROCHLORIDE 50 MG: 10 INJECTION, SOLUTION EPIDURAL; INFILTRATION; INTRACAUDAL; PERINEURAL at 09:30

## 2025-04-23 RX ADMIN — PROPOFOL 50 MG: 10 INJECTION, EMULSION INTRAVENOUS at 09:33

## 2025-04-23 RX ADMIN — PROPOFOL 50 MG: 10 INJECTION, EMULSION INTRAVENOUS at 09:32

## 2025-04-23 RX ADMIN — PROPOFOL 50 MG: 10 INJECTION, EMULSION INTRAVENOUS at 09:35

## 2025-04-23 RX ADMIN — PROPOFOL 50 MG: 10 INJECTION, EMULSION INTRAVENOUS at 09:38

## 2025-04-23 RX ADMIN — PROPOFOL 100 MG: 10 INJECTION, EMULSION INTRAVENOUS at 09:30

## 2025-04-23 ASSESSMENT — PAIN - FUNCTIONAL ASSESSMENT
PAIN_FUNCTIONAL_ASSESSMENT: 0-10
PAIN_FUNCTIONAL_ASSESSMENT: 0-10

## 2025-04-23 NOTE — ANESTHESIA PRE PROCEDURE
Department of Anesthesiology  Preprocedure Note       Name:  Rosalba Tirado   Age:  54 y.o.  :  1970                                          MRN:  [id number]         Date:  2025      Surgeon: Surgeon(s):  Inés Rainey MD    Procedure: Procedure(s):  COLONOSCOPY DIAGNOSTIC    Medications prior to admission:   Prior to Admission medications    Medication Sig Start Date End Date Taking? Authorizing Provider   tirzepatide-weight management (ZEPBOUND) 2.5 MG/0.5ML SOLN subCUTAneous injection (VIAL) Inject 0.5 mLs into the skin once a week 25   Sofia Donaldson MD   butalbital-acetaminophen-caffeine (FIORICET, ESGIC) -40 MG per tablet Take 1 tablet by mouth every 12 hours as needed for Headaches Max Daily Amount: 2 tablets 3/10/25   Sofia Donaldson MD   desvenlafaxine succinate (PRISTIQ) 100 MG TB24 extended release tablet TAKE 1 TABLET BY MOUTH DAILY 24   Sofia Donaldson MD   valACYclovir (VALTREX) 1 g tablet Take 1 tablet by mouth in the morning and at bedtime Take one tablet twice daily at on set of first fever blister and second day one tablet twice daily. 24   Sofia Donaldson MD   albuterol sulfate HFA (VENTOLIN HFA) 108 (90 Base) MCG/ACT inhaler Inhale 2 puffs into the lungs every 6 hours as needed for Wheezing 24   Sofia Donaldson MD   Secukinumab (COSENTYX, 300 MG DOSE, SC) Inject into the skin    ProviderMarcell MD   cetirizine (ZYRTEC) 10 MG tablet Take 1 tablet by mouth daily    Marcell West MD   fluticasone (FLONASE) 50 MCG/ACT nasal spray 2 sprays by Each Nostril route daily 3/22/21   Sofia Donaldson MD       Current medications:    Current Facility-Administered Medications   Medication Dose Route Frequency Provider Last Rate Last Admin    lactated ringers infusion   IntraVENous Continuous Inés Rainey MD           Allergies:  No Known Allergies    Problem List:    Patient Active Problem List   Diagnosis Code    Altered bowel function R19.8

## 2025-04-23 NOTE — ANESTHESIA POSTPROCEDURE EVALUATION
Department of Anesthesiology  Postprocedure Note    Patient: Rosalba Tirado  MRN: [id number]  YOB: 1970  Date of evaluation: 4/23/2025    Procedure Summary       Date: 04/23/25 Room / Location: [place] [address] / [facility] [place]    Anesthesia Start: 0920 Anesthesia Stop:     Procedure: COLONOSCOPY DIAGNOSTIC (Abdomen) Diagnosis:       Abnormal CT of the abdomen      Inflammation of small intestine      (Abnormal CT of the abdomen [R93.5])      (Inflammation of small intestine [K52.9])    Surgeons: Inés Rainey MD Responsible Provider: Sofi Linares APRN - CRNA    Anesthesia Type: General, TIVA ASA Status: 2            Anesthesia Type: General, TIVA    Anastacia Phase I:      Anastacia Phase II:      Anesthesia Post Evaluation    Patient location during evaluation: bedside  Patient participation: complete - patient participated  Level of consciousness: awake  Pain score: 0  Airway patency: patent  Nausea & Vomiting: no nausea and no vomiting  Cardiovascular status: hemodynamically stable  Respiratory status: room air and spontaneous ventilation  Hydration status: stable  Comments: /73   Pulse 94   Temp 97.6 °F (36.4 °C) (Temporal)   Resp 15   Ht 1.727 m (5' 8\")   Wt 93.9 kg (207 lb)   SpO2 99%   BMI 31.47 kg/m²   VSS , pt stable and awake when CRNA left bedside.   Pain management: adequate    No notable events documented.

## 2025-04-23 NOTE — DISCHARGE INSTRUCTIONS
Lodine  Daypro                 Nalfon  Toradol                Ansaid  Feldene               Meclofenamate  Fenoprofen          Ponstel  Mobic                   Celebrex  Vioxx

## 2025-04-23 NOTE — H&P
meniscus     L knee mild, clinical diagnosis 12 after turning foot and popping sound 12 while walking and turn.    Eustachian tube dysfunction     Excessive sleepiness     During the day    Generalized anxiety disorder     H/O varicose veins     History of sprain of foot     Osteoarthritis     Pain in joint of left knee     Palpitations     Psoriasis        Past Surgical History:  Past Surgical History:   Procedure Laterality Date    APPENDECTOMY      BREAST SURGERY      lift and a left implant    CARPAL TUNNEL RELEASE Right     COLONOSCOPY      RENEA Webb    COLONOSCOPY N/A 2022    Dr Rainey, (-)Micro Colitis, sm patch of erythema in distal rectum, Mod diverticulosis left colon, int hem Gr 1, Sub prep no adequate Repeat 22    COLONOSCOPY N/A 2022    Dr Rainey, sm erosions in areas of biopsies done from day previous, sm ulcerations wo bleeding at site of polypectomy, tortuous redundant colon, int hem Gr 1 likely recent blood from rectum but wo at time of exam, 5 year recall    ENDOSCOPY, COLON, DIAGNOSTIC      KNEE ARTHROSCOPY Bilateral     HI COLONOSCOPY FLX DX W/COLLJ SPEC WHEN PFRMD N/A 2016    Dr Naranjo    SINUS SURGERY      nasal septoplasty/ maxillary antrostomies    TONSILLECTOMY      UPPER GASTROINTESTINAL ENDOSCOPY      RENEA Webb    UPPER GASTROINTESTINAL ENDOSCOPY N/A 2022    Dr Rainey,2-3 cm sm hh. (-)[place]       Social History:  Social History     Tobacco Use    Smoking status: Former     Current packs/day: 0.00     Average packs/day: 1.5 packs/day for 10.0 years (15.0 ttl pk-yrs)     Types: Cigarettes     Start date:      Quit date:      Years since quittin.3    Smokeless tobacco: Never   Vaping Use    Vaping status: Never Used   Substance Use Topics    Alcohol use: Yes     Comment: occ    Drug use: No       Vital Signs:   Vitals:    25 0725   BP: 131/86   Pulse: 79   Resp: 18   Temp: 97.7 °F

## 2025-04-23 NOTE — OP NOTE
Patient: Rosalba Tirado : 1970  [facility] Rec#: [id number] Acc#: [id number]   Primary Care Provider Sofia Donaldson MD    Date of Procedure:  2025    Endoscopist: Inés Rainey MD, MD    Referring Provider: Sofia Donaldson MD,     Operation Performed: Colonoscopy up to the distal terminal ileum    Indications:   1. Generalized abdominal pain    2. Abnormal CT of the abdomen    ======================================================  3.  Family history-a grandmother had colon cancer    Recent CT scan of the abdomen and pelvis was without contrast and hence suboptimal but with the following findings:    1.  Low-grade/partial small bowel obstruction with transition point in   the right pelvis at the site of a markedly inflamed long segment of   small bowel which has marked wall thickening and surrounding fat   stranding which extends into the adjacent mesentery. I suspect this is   related to either infectious or inflammatory small bowel disease but   ischemic etiology is not excluded on this noncontrast exam. No   pneumatosis or free air. Oral contrast has progressed through the entire   small bowel, progressed through the transition point, and reached the   colon at the time of imaging.     2.  Significant increase in volume of free intraperitoneal fluid.       This report was signed and finalized on 3/2/2025 1:10 PM by Dr. Molina Patton MD.   =======================================================  Patient's labs including CBC was essentially normal without any elevated WBC count.    Anesthesia:  Sedation was administered by anesthesia who monitored the patient during the procedure.    I met with Rosalba Tirado prior to procedure. We discussed the procedure itself, and I have discussed the risks of endoscopy (including-- but not limited to-- pain, discomfort, bleeding potentially requiring second endoscopic procedure and/or blood transfusion, organ perforation requiring operative repair,

## 2025-05-21 RX ORDER — TIRZEPATIDE 2.5 MG/.5ML
INJECTION, SOLUTION SUBCUTANEOUS
Qty: 2 ML | Refills: 0 | Status: SHIPPED | OUTPATIENT
Start: 2025-05-21

## 2025-05-22 DIAGNOSIS — E78.2 MIXED HYPERLIPIDEMIA: ICD-10-CM

## 2025-05-22 DIAGNOSIS — Z12.4 SCREENING FOR CERVICAL CANCER: ICD-10-CM

## 2025-05-22 DIAGNOSIS — I10 ESSENTIAL (PRIMARY) HYPERTENSION: ICD-10-CM

## 2025-05-22 DIAGNOSIS — Z00.00 ANNUAL PHYSICAL EXAM: ICD-10-CM

## 2025-05-22 DIAGNOSIS — F41.1 GENERALIZED ANXIETY DISORDER: ICD-10-CM

## 2025-05-22 DIAGNOSIS — D84.9 IMMUNOSUPPRESSED STATUS: ICD-10-CM

## 2025-05-22 DIAGNOSIS — Z12.31 ENCOUNTER FOR SCREENING MAMMOGRAM FOR MALIGNANT NEOPLASM OF BREAST: ICD-10-CM

## 2025-05-22 DIAGNOSIS — Z23 NEED FOR VACCINATION: ICD-10-CM

## 2025-05-22 LAB
ALBUMIN SERPL-MCNC: 4 G/DL (ref 3.5–5.2)
ALP SERPL-CCNC: 83 U/L (ref 35–104)
ALT SERPL-CCNC: 23 U/L (ref 10–35)
ANION GAP SERPL CALCULATED.3IONS-SCNC: 12 MMOL/L (ref 8–16)
AST SERPL-CCNC: 24 U/L (ref 10–35)
BILIRUB SERPL-MCNC: 0.3 MG/DL (ref 0.2–1.2)
BUN SERPL-MCNC: 14 MG/DL (ref 6–20)
CALCIUM SERPL-MCNC: 9.5 MG/DL (ref 8.6–10)
CHLORIDE SERPL-SCNC: 104 MMOL/L (ref 98–107)
CHOLEST SERPL-MCNC: 183 MG/DL (ref 0–199)
CO2 SERPL-SCNC: 23 MMOL/L (ref 22–29)
CREAT SERPL-MCNC: 0.6 MG/DL (ref 0.5–0.9)
GLUCOSE SERPL-MCNC: 110 MG/DL (ref 70–99)
HBA1C MFR BLD: 5.7 % (ref 4–5.6)
HDLC SERPL-MCNC: 59 MG/DL (ref 40–60)
LDLC SERPL CALC-MCNC: 107 MG/DL
POTASSIUM SERPL-SCNC: 3.9 MMOL/L (ref 3.5–5.1)
PROT SERPL-MCNC: 7.2 G/DL (ref 6.4–8.3)
SODIUM SERPL-SCNC: 139 MMOL/L (ref 136–145)
TRIGL SERPL-MCNC: 84 MG/DL (ref 0–149)
TSH SERPL DL<=0.005 MIU/L-ACNC: 1.98 UIU/ML (ref 0.27–4.2)

## 2025-05-22 RX ORDER — DESVENLAFAXINE 100 MG/1
100 TABLET, EXTENDED RELEASE ORAL DAILY
Qty: 90 TABLET | Refills: 1 | Status: SHIPPED | OUTPATIENT
Start: 2025-05-22

## 2025-05-22 NOTE — TELEPHONE ENCOUNTER
Rosalba G Brandon called to request a refill on her medication.      Last office visit : 3/7/2025   Next office visit : 5/27/2025     Requested Prescriptions     Pending Prescriptions Disp Refills    desvenlafaxine succinate (PRISTIQ) 100 MG TB24 extended release tablet [Pharmacy Med Name: DESVENLAFAXINE ER SUCCINATE 100MG T] 90 tablet 1     Sig: TAKE 1 TABLET BY MOUTH DAILY            Beata Flannery MA

## 2025-05-24 LAB
GAMMA INTERFERON BACKGROUND BLD IA-ACNC: 0.03 IU/ML
M TB IFN-G BLD-IMP: NEGATIVE
M TB IFN-G CD4+ BCKGRND COR BLD-ACNC: 0 IU/ML
M TB IFN-G CD4+CD8+ BCKGRND COR BLD-ACNC: 0.01 IU/ML
MITOGEN IGNF BCKGRD COR BLD-ACNC: 9.97 IU/ML

## 2025-05-27 ENCOUNTER — OFFICE VISIT (OUTPATIENT)
Dept: INTERNAL MEDICINE | Age: 55
End: 2025-05-27
Payer: COMMERCIAL

## 2025-05-27 VITALS
DIASTOLIC BLOOD PRESSURE: 78 MMHG | SYSTOLIC BLOOD PRESSURE: 118 MMHG | HEART RATE: 72 BPM | WEIGHT: 206.4 LBS | HEIGHT: 68 IN | BODY MASS INDEX: 31.28 KG/M2 | OXYGEN SATURATION: 97 %

## 2025-05-27 DIAGNOSIS — F41.1 GENERALIZED ANXIETY DISORDER: Primary | ICD-10-CM

## 2025-05-27 DIAGNOSIS — R73.01 IFG (IMPAIRED FASTING GLUCOSE): ICD-10-CM

## 2025-05-27 DIAGNOSIS — R73.03 PREDIABETES: ICD-10-CM

## 2025-05-27 DIAGNOSIS — E78.2 MIXED HYPERLIPIDEMIA: ICD-10-CM

## 2025-05-27 DIAGNOSIS — I10 ESSENTIAL (PRIMARY) HYPERTENSION: ICD-10-CM

## 2025-05-27 DIAGNOSIS — E66.09 EXOGENOUS OBESITY: ICD-10-CM

## 2025-05-27 PROCEDURE — 99214 OFFICE O/P EST MOD 30 MIN: CPT | Performed by: INTERNAL MEDICINE

## 2025-05-27 PROCEDURE — 3074F SYST BP LT 130 MM HG: CPT | Performed by: INTERNAL MEDICINE

## 2025-05-27 PROCEDURE — 3078F DIAST BP <80 MM HG: CPT | Performed by: INTERNAL MEDICINE

## 2025-05-27 ASSESSMENT — ENCOUNTER SYMPTOMS
WHEEZING: 0
COUGH: 0
ABDOMINAL PAIN: 0
CONSTIPATION: 0
CHEST TIGHTNESS: 0
SORE THROAT: 0

## 2025-05-27 NOTE — PROGRESS NOTES
Chief Complaint   Patient presents with    6 Month Follow-Up     History of presenting illness:  Rosalba Tirado is a54 y.o. female who presents today for follow up on her chronic medical conditions as noted below.    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    Generalized anxiety disorder 10/22/2017    IFG (impaired fasting glucose) 10/22/2017    Psoriasis 10/22/2017    IBS (irritable bowel syndrome) 10/22/2017    History of bone density study      Overview Note:     4/2018 normal      Abnormal liver function 08/15/2017    Foot fracture, left 08/15/2017    Foot pain 08/15/2017    Altered bowel function 10/31/2016     Overview Note:     Updating Deprecated Diagnoses      Elevated blood pressure 10/15/2016    Essential (primary) hypertension 10/15/2016     Past Medical History:   Diagnosis Date    Acquired deviated nasal septum     Anal bleeding 10/31/2016    Ankle injury     Asthma     Benign paroxysmal positional vertigo     Derangement of medial meniscus     L knee mild, clinical diagnosis 9/19/12 after turning foot and popping sound 9/16/12 while walking and turn.    Eustachian tube dysfunction     Excessive sleepiness     During the day    Generalized anxiety disorder     H/O varicose veins     History of sprain of foot     Osteoarthritis     Pain in joint of left knee     Palpitations     Psoriasis       Past Surgical History:   Procedure Laterality Date    APPENDECTOMY      BREAST SURGERY      lift and a left implant    CARPAL TUNNEL RELEASE Right     COLONOSCOPY  1992    Dr. [place]    COLONOSCOPY N/A 12/12/2022    Dr Rainey, (-)Micro Colitis, sm patch of erythema in distal rectum, Mod diverticulosis left colon, int hem Gr 1, Sub prep no adequate Repeat 12-13-22    COLONOSCOPY N/A 12/13/2022    Dr Rainey, sm erosions in

## 2025-06-26 ENCOUNTER — HOSPITAL ENCOUNTER (EMERGENCY)
Age: 55
Discharge: LEFT BEFORE DISCHARGE | End: 2025-06-26

## 2025-06-26 VITALS
OXYGEN SATURATION: 99 % | DIASTOLIC BLOOD PRESSURE: 100 MMHG | BODY MASS INDEX: 30.16 KG/M2 | RESPIRATION RATE: 18 BRPM | TEMPERATURE: 97.8 F | SYSTOLIC BLOOD PRESSURE: 134 MMHG | HEIGHT: 68 IN | WEIGHT: 199 LBS | HEART RATE: 95 BPM

## 2025-06-26 PROCEDURE — 88305 TISSUE EXAM BY PATHOLOGIST: CPT | Performed by: GENERAL PRACTICE

## 2025-06-26 NOTE — TELEPHONE ENCOUNTER
Rosalba called requesting a refill of the below medication which has been pended for you:     Requested Prescriptions     Pending Prescriptions Disp Refills    ZEPBOUND 2.5 MG/0.5ML SOLN subcutaneous solution [Pharmacy Med Name: ZEPBOUND 2.5 MG/0.5ML SUBCUTANEOUS SOLUTION] 2 mL 0     Sig: INJECT 0.5 ML (2.5 MG) UNDER THE SKIN ONCE WEEKLY (0.5ML= 50 UNITS)       Last Appointment Date: 5/27/2025  Next Appointment Date: 11/26/2025    No Known Allergies

## 2025-06-27 RX ORDER — TIRZEPATIDE 2.5 MG/.5ML
INJECTION, SOLUTION SUBCUTANEOUS
Qty: 2 ML | Refills: 0 | Status: SHIPPED | OUTPATIENT
Start: 2025-06-27

## 2025-06-30 ENCOUNTER — LAB REQUISITION (OUTPATIENT)
Dept: LAB | Facility: HOSPITAL | Age: 55
End: 2025-06-30
Payer: COMMERCIAL

## 2025-06-30 DIAGNOSIS — K92.2 GASTROINTESTINAL HEMORRHAGE, UNSPECIFIED: ICD-10-CM

## 2025-07-02 LAB
CYTO UR: NORMAL
LAB AP CASE REPORT: NORMAL
LAB AP CLINICAL INFORMATION: NORMAL
LAB AP DIAGNOSIS COMMENT: NORMAL
Lab: NORMAL
PATH REPORT.FINAL DX SPEC: NORMAL
PATH REPORT.GROSS SPEC: NORMAL

## 2025-07-18 ENCOUNTER — TRANSCRIBE ORDERS (OUTPATIENT)
Dept: ADMINISTRATIVE | Facility: HOSPITAL | Age: 55
End: 2025-07-18
Payer: COMMERCIAL

## 2025-07-18 DIAGNOSIS — K21.9 GERD WITHOUT ESOPHAGITIS: Primary | ICD-10-CM

## 2025-07-28 ENCOUNTER — HOSPITAL ENCOUNTER (OUTPATIENT)
Dept: GENERAL RADIOLOGY | Facility: HOSPITAL | Age: 55
Discharge: HOME | End: 2025-07-28
Admitting: GENERAL PRACTICE
Payer: COMMERCIAL

## 2025-07-28 DIAGNOSIS — K21.9 GERD WITHOUT ESOPHAGITIS: ICD-10-CM

## 2025-07-28 PROCEDURE — 74220 X-RAY XM ESOPHAGUS 1CNTRST: CPT

## 2025-07-28 RX ADMIN — BARIUM SULFATE 120 ML: 980 POWDER, FOR SUSPENSION ORAL at 08:39

## 2025-07-28 RX ADMIN — ANTACID/ANTIFLATULENT 1 PACKET: 380; 550; 10; 10 GRANULE, EFFERVESCENT ORAL at 08:40

## 2025-08-07 ENCOUNTER — HOSPITAL ENCOUNTER (OUTPATIENT)
Dept: GENERAL RADIOLOGY | Age: 55
Discharge: HOME | End: 2025-08-07
Payer: COMMERCIAL

## 2025-08-07 ENCOUNTER — OFFICE VISIT (OUTPATIENT)
Dept: INTERNAL MEDICINE | Age: 55
End: 2025-08-07
Payer: COMMERCIAL

## 2025-08-07 VITALS
BODY MASS INDEX: 30.74 KG/M2 | HEIGHT: 68 IN | OXYGEN SATURATION: 98 % | HEART RATE: 90 BPM | DIASTOLIC BLOOD PRESSURE: 78 MMHG | WEIGHT: 202.8 LBS | SYSTOLIC BLOOD PRESSURE: 120 MMHG

## 2025-08-07 DIAGNOSIS — M25.512 ACUTE PAIN OF LEFT SHOULDER: ICD-10-CM

## 2025-08-07 DIAGNOSIS — S46.012S STRAIN OF LEFT ROTATOR CUFF CAPSULE, SEQUELA: ICD-10-CM

## 2025-08-07 DIAGNOSIS — M25.512 ACUTE PAIN OF LEFT SHOULDER: Primary | ICD-10-CM

## 2025-08-07 PROCEDURE — 99213 OFFICE O/P EST LOW 20 MIN: CPT | Performed by: INTERNAL MEDICINE

## 2025-08-07 PROCEDURE — 3074F SYST BP LT 130 MM HG: CPT | Performed by: INTERNAL MEDICINE

## 2025-08-07 PROCEDURE — 3078F DIAST BP <80 MM HG: CPT | Performed by: INTERNAL MEDICINE

## 2025-08-07 PROCEDURE — 73030 X-RAY EXAM OF SHOULDER: CPT

## 2025-08-07 RX ORDER — RIVAROXABAN 20 MG/1
20 TABLET, FILM COATED ORAL DAILY
COMMUNITY
Start: 2025-07-17

## 2025-08-07 ASSESSMENT — ENCOUNTER SYMPTOMS
CONSTIPATION: 0
SORE THROAT: 0
ABDOMINAL PAIN: 0
WHEEZING: 0
CHEST TIGHTNESS: 0
COUGH: 0

## 2025-08-26 ENCOUNTER — PATIENT MESSAGE (OUTPATIENT)
Dept: INTERNAL MEDICINE | Age: 55
End: 2025-08-26

## 2025-08-26 DIAGNOSIS — R39.9 UTI SYMPTOMS: ICD-10-CM

## 2025-08-26 DIAGNOSIS — R39.9 UTI SYMPTOMS: Primary | ICD-10-CM

## 2025-08-26 LAB
BACTERIA URNS QL MICRO: NEGATIVE /HPF
BILIRUB UR QL STRIP: NEGATIVE
CLARITY UR: CLEAR
COLOR UR: YELLOW
CRYSTALS URNS MICRO: ABNORMAL /HPF
EPI CELLS #/AREA URNS AUTO: 0 /HPF (ref 0–5)
GLUCOSE UR STRIP.AUTO-MCNC: NEGATIVE MG/DL
HGB UR STRIP.AUTO-MCNC: ABNORMAL MG/L
HYALINE CASTS #/AREA URNS AUTO: 0 /HPF (ref 0–8)
KETONES UR STRIP.AUTO-MCNC: NEGATIVE MG/DL
LEUKOCYTE ESTERASE UR QL STRIP.AUTO: ABNORMAL
NITRITE UR QL STRIP.AUTO: NEGATIVE
PH UR STRIP.AUTO: 7 [PH] (ref 5–8)
PROT UR STRIP.AUTO-MCNC: NEGATIVE MG/DL
RBC #/AREA URNS AUTO: 7 /HPF (ref 0–4)
SP GR UR STRIP.AUTO: 1.01 (ref 1–1.03)
UROBILINOGEN UR STRIP.AUTO-MCNC: 0.2 E.U./DL
WBC #/AREA URNS AUTO: 58 /HPF (ref 0–5)

## 2025-08-26 RX ORDER — CIPROFLOXACIN 250 MG/1
250 TABLET, FILM COATED ORAL 2 TIMES DAILY
Qty: 10 TABLET | Refills: 0 | Status: SHIPPED | OUTPATIENT
Start: 2025-08-26 | End: 2025-08-31

## 2025-08-27 RX ORDER — PHENAZOPYRIDINE HYDROCHLORIDE 200 MG/1
200 TABLET, FILM COATED ORAL 3 TIMES DAILY PRN
Qty: 9 TABLET | Refills: 0 | Status: SHIPPED | OUTPATIENT
Start: 2025-08-27 | End: 2025-08-30

## 2025-08-28 ENCOUNTER — OFFICE VISIT (OUTPATIENT)
Age: 55
End: 2025-08-28

## 2025-08-28 VITALS
SYSTOLIC BLOOD PRESSURE: 114 MMHG | DIASTOLIC BLOOD PRESSURE: 74 MMHG | TEMPERATURE: 98.4 F | HEIGHT: 68 IN | WEIGHT: 205 LBS | RESPIRATION RATE: 20 BRPM | OXYGEN SATURATION: 99 % | BODY MASS INDEX: 31.07 KG/M2 | HEART RATE: 88 BPM

## 2025-08-28 DIAGNOSIS — M79.2 RADICULAR PAIN IN LEFT ARM: Primary | ICD-10-CM

## 2025-08-28 DIAGNOSIS — M19.019 OSTEOARTHRITIS OF ACROMIOCLAVICULAR JOINT: ICD-10-CM

## 2025-08-28 LAB
BACTERIA UR CULT: ABNORMAL
BACTERIA UR CULT: ABNORMAL
ORGANISM: ABNORMAL

## 2025-08-28 RX ORDER — TRIAMCINOLONE ACETONIDE 40 MG/ML
40 INJECTION, SUSPENSION INTRA-ARTICULAR; INTRAMUSCULAR ONCE
Status: SHIPPED | OUTPATIENT
Start: 2025-08-28

## (undated) DEVICE — ENDO KIT,LOURDES HOSPITAL: Brand: MEDLINE INDUSTRIES, INC.

## (undated) DEVICE — SINGLE PORT MANIFOLD: Brand: NEPTUNE 2

## (undated) DEVICE — BRUSH ENDOSCP 2 END CHN HEDGEHOG

## (undated) DEVICE — FORCEPS BX L240CM JAW DIA2.4MM ORNG L CAP W/ NDL DISP RAD

## (undated) DEVICE — CLEANING SPONGE: Brand: KOALA™

## (undated) DEVICE — CANNULA NSL AD L7FT DIV O2 CO2 W/ M LUERLOCK TRMPT CONN

## (undated) DEVICE — SUPPLEMENT DIGESTIVE H2O SOL GI-EASE

## (undated) DEVICE — ADAPTER CLEANING PORPOISE CLEANING

## (undated) DEVICE — FORCEPS BX L L240CM DIA2.4MM RAD JAW 4 HOT FOR POLYP DISP